# Patient Record
Sex: FEMALE | Race: WHITE | HISPANIC OR LATINO | Employment: FULL TIME | URBAN - METROPOLITAN AREA
[De-identification: names, ages, dates, MRNs, and addresses within clinical notes are randomized per-mention and may not be internally consistent; named-entity substitution may affect disease eponyms.]

---

## 2020-07-13 ENCOUNTER — OFFICE VISIT (OUTPATIENT)
Dept: URGENT CARE | Facility: CLINIC | Age: 60
End: 2020-07-13
Payer: COMMERCIAL

## 2020-07-13 VITALS
HEART RATE: 84 BPM | HEIGHT: 61 IN | TEMPERATURE: 98 F | SYSTOLIC BLOOD PRESSURE: 123 MMHG | RESPIRATION RATE: 19 BRPM | WEIGHT: 151 LBS | OXYGEN SATURATION: 97 % | DIASTOLIC BLOOD PRESSURE: 84 MMHG | BODY MASS INDEX: 28.51 KG/M2

## 2020-07-13 DIAGNOSIS — J06.9 VIRAL URI WITH COUGH: Primary | ICD-10-CM

## 2020-07-13 DIAGNOSIS — Z20.822 EXPOSURE TO COVID-19 VIRUS: ICD-10-CM

## 2020-07-13 PROCEDURE — U0003 INFECTIOUS AGENT DETECTION BY NUCLEIC ACID (DNA OR RNA); SEVERE ACUTE RESPIRATORY SYNDROME CORONAVIRUS 2 (SARS-COV-2) (CORONAVIRUS DISEASE [COVID-19]), AMPLIFIED PROBE TECHNIQUE, MAKING USE OF HIGH THROUGHPUT TECHNOLOGIES AS DESCRIBED BY CMS-2020-01-R: HCPCS

## 2020-07-13 PROCEDURE — 99203 OFFICE O/P NEW LOW 30 MIN: CPT | Mod: S$GLB,,, | Performed by: NURSE PRACTITIONER

## 2020-07-13 PROCEDURE — 71046 X-RAY EXAM CHEST 2 VIEWS: CPT | Mod: FY,S$GLB,, | Performed by: RADIOLOGY

## 2020-07-13 PROCEDURE — 71046 XR CHEST PA AND LATERAL: ICD-10-PCS | Mod: FY,S$GLB,, | Performed by: RADIOLOGY

## 2020-07-13 PROCEDURE — 99203 PR OFFICE/OUTPT VISIT, NEW, LEVL III, 30-44 MIN: ICD-10-PCS | Mod: S$GLB,,, | Performed by: NURSE PRACTITIONER

## 2020-07-13 RX ORDER — TRAMADOL HYDROCHLORIDE 50 MG/1
TABLET ORAL
COMMUNITY
Start: 2020-06-04 | End: 2023-12-04

## 2020-07-13 RX ORDER — BLOOD-GLUCOSE METER
EACH MISCELLANEOUS
COMMUNITY
Start: 2020-06-25

## 2020-07-13 RX ORDER — SITAGLIPTIN 100 MG/1
TABLET, FILM COATED ORAL
COMMUNITY
Start: 2020-06-29 | End: 2022-03-03

## 2020-07-13 RX ORDER — IRBESARTAN AND HYDROCHLOROTHIAZIDE 300; 12.5 MG/1; MG/1
1 TABLET, FILM COATED ORAL DAILY
COMMUNITY
Start: 2020-06-15

## 2020-07-13 RX ORDER — GLIPIZIDE 5 MG/1
TABLET, FILM COATED, EXTENDED RELEASE ORAL
COMMUNITY
Start: 2020-06-29 | End: 2023-12-04

## 2020-07-13 RX ORDER — PANTOPRAZOLE SODIUM 20 MG/1
TABLET, DELAYED RELEASE ORAL
COMMUNITY
Start: 2020-06-29

## 2020-07-13 RX ORDER — CETIRIZINE HYDROCHLORIDE 10 MG/1
TABLET ORAL
COMMUNITY
Start: 2020-06-03 | End: 2022-04-13

## 2020-07-13 RX ORDER — CALCIUM CARB/VITAMIN D3/VIT K1 500-500-40
TABLET,CHEWABLE ORAL
COMMUNITY
Start: 2020-04-06

## 2020-07-13 RX ORDER — LEVOTHYROXINE SODIUM 100 UG/1
100 TABLET ORAL
COMMUNITY
Start: 2020-06-29

## 2020-07-13 RX ORDER — EMPAGLIFLOZIN AND METFORMIN HYDROCHLORIDE 12.5; 1 MG/1; MG/1
TABLET ORAL
COMMUNITY
Start: 2020-06-15 | End: 2023-12-04

## 2020-07-13 RX ORDER — ATORVASTATIN CALCIUM 10 MG/1
TABLET, FILM COATED ORAL
COMMUNITY
Start: 2020-06-29

## 2020-07-13 NOTE — LETTER
[address] [facility]  [place] [address]  Phone: [phone]  Fax: [phone]          Return to Work/School    Patient: Jaelyn Peter  YOB: 1960   Date: 07/13/2020     To Whom It May Concern:     Jaelyn Peter was in contact with/seen in my office on 07/13/2020. COVID-19 is present in our communities across the state. There is limited testing for COVID at this time, so not all patients can be tested. In this situation, your employee meets the following criteria:     Jaelyn Peter has met the criteria for COVID-19 testing based upon symptoms, travel, and/or potential exposure. The test has been completed and is pending results at this time. During this time the employee is not able to work and should be quarantined per the Centers for Disease Control timelines.      If you have any questions or concerns, or if I can be of further assistance, please do not hesitate to contact me.     Sincerely,          HORACE Benedict

## 2020-07-13 NOTE — PROGRESS NOTES
"Subjective:       Patient ID: Jaelyn Peter is a 60 y.o. female.    Vitals:  height is 5' 1" (1.549 m) and weight is 68.5 kg (151 lb). Her temperature is 98 °F (36.7 °C). Her blood pressure is 123/84 and her pulse is 84. Her respiration is 19 and oxygen saturation is 97%.     Chief Complaint: URI and COVID-19 Concerns    URI   This is a new problem. The current episode started in the past 7 days (thursday ). The problem has been gradually worsening. Associated symptoms include coughing, headaches, rhinorrhea and sinus pain. Pertinent negatives include no abdominal pain, chest pain, congestion, diarrhea, dysuria, ear pain, joint pain, joint swelling, nausea, neck pain, plugged ear sensation, rash, sneezing, sore throat, swollen glands, vomiting or wheezing. Associated symptoms comments: Loss of smell. Treatments tried: joy tea. The treatment provided mild relief.       Constitution: Positive for fatigue. Negative for chills, sweating and fever.   HENT: Positive for sinus pain and sinus pressure. Negative for ear pain, congestion, sore throat and voice change.    Neck: Negative for neck pain and painful lymph nodes.   Cardiovascular: Negative for chest pain.   Eyes: Negative for eye redness.   Respiratory: Positive for cough. Negative for chest tightness, sputum production, bloody sputum, COPD, shortness of breath, stridor, wheezing and asthma.    Gastrointestinal: Negative for abdominal pain, nausea, vomiting and diarrhea.   Genitourinary: Negative for dysuria.   Musculoskeletal: Positive for muscle ache.   Skin: Negative for rash.   Allergic/Immunologic: Negative for seasonal allergies, asthma and sneezing.   Neurological: Positive for headaches.   Hematologic/Lymphatic: Negative for swollen lymph nodes.       Objective:      Physical Exam    Xr Chest Pa And Lateral    Result Date: 7/13/2020  EXAMINATION: XR CHEST PA AND LATERAL CLINICAL HISTORY: Contact with and (suspected) exposure to other viral " communicable diseases TECHNIQUE: PA and lateral views of the chest were performed. COMPARISON: 09/17/2010 FINDINGS: Heart size and pulmonary vascularity are within normal limits.  Mild tortuosity of the thoracic aorta.  No hilar or mediastinal enlargement.  Lungs are satisfactorily expanded and appear free of active disease.  No pleural fluid or pneumothorax.  Hypertrophic degenerative changes involving the thoracic spine.     No active cardiopulmonary disease and no significant interval change Electronically signed by: Moises Mendoza MD Date:    07/13/2020 Time:      Patient was seen remotely due to State of Emergency for the COVID-19 outbreak.  No apparent distress noted.  Able to speak in full sentences without difficulty or pauses.  No labored breathing noted.    13:31  Assessment:       1. Viral URI with cough    2. Exposure to Covid-19 Virus        Plan:     Discussed CDC recommendations and symptomatic treatment.  Results available within 5-7 days.  Patient verbalized understanding.    Viral URI with cough  -     XR CHEST PA AND LATERAL; Future; Expected date: 07/13/2020    Exposure to Covid-19 Virus  -     XR CHEST PA AND LATERAL; Future; Expected date: 07/13/2020         Patient Instructions   Instructions for Patients with Confirmed or Suspected COVID-19    If you are awaiting your test result, you will either be called or it will be released to the patient portal.  If you have any questions about your test, please visit [email or web address] or call our COVID-19 information line at [phone].      Preventing the Spread of Coronavirus Disease 2019 (COVID-19) in Homes and Residential Communities -- Patients     Prevention steps for people with confirmed or suspected COVID-19 (including persons under investigation) who do not need to be hospitalized and people with confirmed COVID-19 who were hospitalized and determined to be medically stable to go home.      Stay home except to get medical care.     Separate yourself from other people and animals in your home.    Call ahead before visiting your doctor.    Wear a face mask.    Cover your coughs and sneezes.    Clean your hands often.    Avoid sharing personal household items.    Clean all high-touch surfaces every day.    Monitor your symptoms. Seek prompt medical attention if your illness is worsening (e.g., difficulty breathing). Before seeking care, call your healthcare provider.    If you have a medical emergency and must call 911, notify the dispatcher that you have or are being evaluated for COVID-19. If possible, put on a face mask before emergency medical services arrive.    Use the following symptom-based strategy to return to normal activity following a suspected or confirmed case of COVID-19. Continue isolation until:   o At least 3 days (72 hours) have passed since recovery defined as resolution of fever without the use of fever-reducing medications and improvement in respiratory symptoms (e.g. cough, shortness of breath), and   o At least 10 days have passed since symptoms first appeared.     Precautions for household members, intimate partners and caregivers in a non-healthcare setting of a patient with symptomatic laboratory-confirmed COVID-19 or a patient under investigation.     Household members, intimate partners and caregivers in a non-healthcare setting may have close contact with a person with symptomatic, laboratory-confirmed COVID-19 or a person under investigation. Close contacts should monitor their health; they should call their healthcare provider right away if they develop symptoms suggestive of COVID-19 (e.g., fever, cough, shortness of breath). Close contacts should also follow these recommendations:     · Stay home for the duration of the time recommended by healthcare provider, except to get medical care. Separate yourself from other people and animals in the home.  · Monitor the patients symptoms. If the patient  is getting sicker, call his or her healthcare provider. If the patient has a medical emergency and you need to call 911, notify the dispatch personnel that the patient has or is being evaluated for COVID-19.   · Wear a facemask when around other people such as sharing a room or vehicle and before entering a healthcare provider's office.  · Cover coughs and sneezes with a tissue. Throw used tissues in a lined trash can immediately and wash hands.  · Clean hands often with soap and water for at least 20 seconds or with an alcohol-based hand , rubbing hands together until they feel dry. Avoid touching your eyes, nose, and mouth with unwashed hands.  · Clean all high-touch; surfaces every day, including counters, tabletops, doorknobs, bathroom fixtures, toilets, phones, keyboards, tablets, bedside tables, etc. Use a household cleaning spray or wipe according to label instructions.  · Avoid sharing personal household items such as dishes, drinking glasses, cups, towels, bedding, etc. After these items are used, they should be washed thoroughly with soap and water.  · Use the following symptom-based strategy to return to normal activity following a suspected or confirmed case of COVID-19. Continue isolation until:   · At least 3 days (72 hours) have passed since recovery defined as resolution of fever without the use of fever-reducing medications and improvement in respiratory symptoms (e.g. cough, shortness of breath), and   At least 10 days have passed since symptoms first appeared.Guidelines for General Prevention of COVID-19    Take steps to protect yourself from COVID-19. Perform hand hygiene frequently. Wash your hands often with soap and water for at least 20 seconds of use and alcohol-based hand , covering all surfaces of your hands and rubbing them together until they feel dry.  Avoid touching your eyes, nose, and mouth with unwashed hands.  Avoid close contact with people and stay home if  youre sick, except to get medical care.   Cover coughs and sneezes with a tissue, or use the inside of your elbow. Immediately wash your hands or use hand .     For more information, see CDC link below:    · https://www.cdc.gov/coronavirus/2019-ncov/hcp/guidance-prevent-spread.html#precautions

## 2020-07-13 NOTE — PATIENT INSTRUCTIONS
Instructions for Patients with Confirmed or Suspected COVID-19    If you are awaiting your test result, you will either be called or it will be released to the patient portal.  If you have any questions about your test, please visit [email or web address] or call our COVID-19 information line at [phone].      Preventing the Spread of Coronavirus Disease 2019 (COVID-19) in Homes and Residential Communities -- Patients     Prevention steps for people with confirmed or suspected COVID-19 (including persons under investigation) who do not need to be hospitalized and people with confirmed COVID-19 who were hospitalized and determined to be medically stable to go home.      Stay home except to get medical care.    Separate yourself from other people and animals in your home.    Call ahead before visiting your doctor.    Wear a face mask.    Cover your coughs and sneezes.    Clean your hands often.    Avoid sharing personal household items.    Clean all high-touch surfaces every day.    Monitor your symptoms. Seek prompt medical attention if your illness is worsening (e.g., difficulty breathing). Before seeking care, call your healthcare provider.    If you have a medical emergency and must call 911, notify the dispatcher that you have or are being evaluated for COVID-19. If possible, put on a face mask before emergency medical services arrive.    Use the following symptom-based strategy to return to normal activity following a suspected or confirmed case of COVID-19. Continue isolation until:   o At least 3 days (72 hours) have passed since recovery defined as resolution of fever without the use of fever-reducing medications and improvement in respiratory symptoms (e.g. cough, shortness of breath), and   o At least 10 days have passed since symptoms first appeared.     Precautions for household members, intimate partners and caregivers in a non-healthcare setting of a patient with symptomatic  laboratory-confirmed COVID-19 or a patient under investigation.     Household members, intimate partners and caregivers in a non-healthcare setting may have close contact with a person with symptomatic, laboratory-confirmed COVID-19 or a person under investigation. Close contacts should monitor their health; they should call their healthcare provider right away if they develop symptoms suggestive of COVID-19 (e.g., fever, cough, shortness of breath). Close contacts should also follow these recommendations:     · Stay home for the duration of the time recommended by healthcare provider, except to get medical care. Separate yourself from other people and animals in the home.  · Monitor the patients symptoms. If the patient is getting sicker, call his or her healthcare provider. If the patient has a medical emergency and you need to call 911, notify the dispatch personnel that the patient has or is being evaluated for COVID-19.   · Wear a facemask when around other people such as sharing a room or vehicle and before entering a healthcare provider's office.  · Cover coughs and sneezes with a tissue. Throw used tissues in a lined trash can immediately and wash hands.  · Clean hands often with soap and water for at least 20 seconds or with an alcohol-based hand , rubbing hands together until they feel dry. Avoid touching your eyes, nose, and mouth with unwashed hands.  · Clean all high-touch; surfaces every day, including counters, tabletops, doorknobs, bathroom fixtures, toilets, phones, keyboards, tablets, bedside tables, etc. Use a household cleaning spray or wipe according to label instructions.  · Avoid sharing personal household items such as dishes, drinking glasses, cups, towels, bedding, etc. After these items are used, they should be washed thoroughly with soap and water.  · Use the following symptom-based strategy to return to normal activity following a suspected or confirmed case of COVID-19.  Continue isolation until:   · At least 3 days (72 hours) have passed since recovery defined as resolution of fever without the use of fever-reducing medications and improvement in respiratory symptoms (e.g. cough, shortness of breath), and   At least 10 days have passed since symptoms first appeared.Guidelines for General Prevention of COVID-19    Take steps to protect yourself from COVID-19. Perform hand hygiene frequently. Wash your hands often with soap and water for at least 20 seconds of use and alcohol-based hand , covering all surfaces of your hands and rubbing them together until they feel dry.  Avoid touching your eyes, nose, and mouth with unwashed hands.  Avoid close contact with people and stay home if youre sick, except to get medical care.   Cover coughs and sneezes with a tissue, or use the inside of your elbow. Immediately wash your hands or use hand .     For more information, see CDC link below:    · https://www.cdc.gov/coronavirus/2019-ncov/hcp/guidance-prevent-spread.html#precautions

## 2020-07-13 NOTE — LETTER
July 27, 2020      [facility]  [address]  [place] [address]  Phone: [phone]  Fax: [phone]       Patient: Jaelyn Peter   YOB: 1960  Date of Visit: 07/27/2020    To Whom It May Concern:    Joanne Peter  was at [facility] on 07/27/2020. She may return to work/school on 7/28/2020 with no restrictions. If you have any questions or concerns, or if I can be of further assistance, please do not hesitate to contact me.    Sincerely,        ROXANNE WhiteC

## 2020-07-16 ENCOUNTER — TELEPHONE (OUTPATIENT)
Dept: URGENT CARE | Facility: CLINIC | Age: 60
End: 2020-07-16

## 2020-07-16 DIAGNOSIS — U07.1 COVID-19 VIRUS DETECTED: Primary | ICD-10-CM

## 2020-07-16 DIAGNOSIS — U07.1 COVID-19 VIRUS DETECTED: ICD-10-CM

## 2020-07-16 LAB — SARS-COV-2 RNA RESP QL NAA+PROBE: DETECTED

## 2020-07-16 RX ORDER — PROMETHAZINE HYDROCHLORIDE AND DEXTROMETHORPHAN HYDROBROMIDE 6.25; 15 MG/5ML; MG/5ML
5 SYRUP ORAL
Qty: 118 ML | Refills: 0 | Status: SHIPPED | OUTPATIENT
Start: 2020-07-16 | End: 2020-07-26

## 2020-07-16 RX ORDER — ALBUTEROL SULFATE 90 UG/1
2 AEROSOL, METERED RESPIRATORY (INHALATION) EVERY 6 HOURS PRN
Qty: 6.7 G | Refills: 1 | Status: SHIPPED | OUTPATIENT
Start: 2020-07-16

## 2020-07-16 RX ORDER — BENZONATATE 200 MG/1
200 CAPSULE ORAL 3 TIMES DAILY PRN
Qty: 20 CAPSULE | Refills: 0 | Status: SHIPPED | OUTPATIENT
Start: 2020-07-16 | End: 2020-07-26

## 2020-07-16 NOTE — TELEPHONE ENCOUNTER
Pt lab results discussed with patient. She is doing well, just feels tired. Pt denies SOB, chest pain, dizziness, heart palpitations, nausea and vomiting.

## 2021-02-02 ENCOUNTER — HOSPITAL ENCOUNTER (EMERGENCY)
Facility: HOSPITAL | Age: 61
Discharge: HOME | End: 2021-02-03
Attending: EMERGENCY MEDICINE
Payer: MEDICAID

## 2021-02-02 DIAGNOSIS — N30.00 ACUTE CYSTITIS WITHOUT HEMATURIA: ICD-10-CM

## 2021-02-02 DIAGNOSIS — I10 ESSENTIAL HYPERTENSION: ICD-10-CM

## 2021-02-02 DIAGNOSIS — M54.10 RADICULOPATHY, UNSPECIFIED SPINAL REGION: ICD-10-CM

## 2021-02-02 DIAGNOSIS — R51.9 LEFT-SIDED HEADACHE: Primary | ICD-10-CM

## 2021-02-02 DIAGNOSIS — M79.602 LEFT ARM PAIN: ICD-10-CM

## 2021-02-02 LAB
ALBUMIN SERPL BCP-MCNC: 4 G/DL (ref 3.5–5.2)
ALP SERPL-CCNC: 85 U/L (ref 55–135)
ALT SERPL W/O P-5'-P-CCNC: 32 U/L (ref 10–44)
ANION GAP SERPL CALC-SCNC: 11 MMOL/L (ref 8–16)
AST SERPL-CCNC: 25 U/L (ref 10–40)
BACTERIA #/AREA URNS HPF: ABNORMAL /HPF
BILIRUB SERPL-MCNC: 0.4 MG/DL (ref 0.1–1)
BILIRUB UR QL STRIP: NEGATIVE
BUN SERPL-MCNC: 11 MG/DL (ref 8–23)
CALCIUM SERPL-MCNC: 9.2 MG/DL (ref 8.7–10.5)
CHLORIDE SERPL-SCNC: 106 MMOL/L (ref 95–110)
CLARITY UR: ABNORMAL
CO2 SERPL-SCNC: 20 MMOL/L (ref 23–29)
COLOR UR: YELLOW
CREAT SERPL-MCNC: 0.7 MG/DL (ref 0.5–1.4)
ERYTHROCYTE [DISTWIDTH] IN BLOOD BY AUTOMATED COUNT: 13.8 % (ref 11.5–14.5)
EST. GFR  (AFRICAN AMERICAN): >60 ML/MIN/1.73 M^2
EST. GFR  (NON AFRICAN AMERICAN): >60 ML/MIN/1.73 M^2
GLUCOSE SERPL-MCNC: 98 MG/DL (ref 70–110)
GLUCOSE UR QL STRIP: ABNORMAL
HCT VFR BLD AUTO: 41.4 % (ref 37–48.5)
HGB BLD-MCNC: 14 G/DL (ref 12–16)
HGB UR QL STRIP: NEGATIVE
KETONES UR QL STRIP: NEGATIVE
LEUKOCYTE ESTERASE UR QL STRIP: NEGATIVE
MCH RBC QN AUTO: 31 PG (ref 27–31)
MCHC RBC AUTO-ENTMCNC: 33.8 G/DL (ref 32–36)
MCV RBC AUTO: 92 FL (ref 82–98)
MICROSCOPIC COMMENT: ABNORMAL
NITRITE UR QL STRIP: POSITIVE
PH UR STRIP: 5 [PH] (ref 5–8)
PLATELET # BLD AUTO: 253 K/UL (ref 150–350)
PMV BLD AUTO: 11.1 FL (ref 9.2–12.9)
POTASSIUM SERPL-SCNC: 4.1 MMOL/L (ref 3.5–5.1)
PROT SERPL-MCNC: 7.7 G/DL (ref 6–8.4)
PROT UR QL STRIP: NEGATIVE
RBC # BLD AUTO: 4.52 M/UL (ref 4–5.4)
RBC #/AREA URNS HPF: 1 /HPF (ref 0–4)
SODIUM SERPL-SCNC: 137 MMOL/L (ref 136–145)
SP GR UR STRIP: 1.02 (ref 1–1.03)
TROPONIN I SERPL DL<=0.01 NG/ML-MCNC: 0.01 NG/ML (ref 0–0.03)
URN SPEC COLLECT METH UR: ABNORMAL
UROBILINOGEN UR STRIP-ACNC: NEGATIVE EU/DL
WBC # BLD AUTO: 11.51 K/UL (ref 3.9–12.7)
YEAST URNS QL MICRO: ABNORMAL

## 2021-02-02 PROCEDURE — 96374 THER/PROPH/DIAG INJ IV PUSH: CPT

## 2021-02-02 PROCEDURE — 93005 ELECTROCARDIOGRAM TRACING: CPT

## 2021-02-02 PROCEDURE — 80053 COMPREHEN METABOLIC PANEL: CPT

## 2021-02-02 PROCEDURE — 93010 EKG 12-LEAD: ICD-10-PCS | Mod: ,,, | Performed by: INTERNAL MEDICINE

## 2021-02-02 PROCEDURE — 63600175 PHARM REV CODE 636 W HCPCS: Performed by: EMERGENCY MEDICINE

## 2021-02-02 PROCEDURE — 99285 EMERGENCY DEPT VISIT HI MDM: CPT | Mod: 25

## 2021-02-02 PROCEDURE — 84484 ASSAY OF TROPONIN QUANT: CPT

## 2021-02-02 PROCEDURE — 81000 URINALYSIS NONAUTO W/SCOPE: CPT

## 2021-02-02 PROCEDURE — 85027 COMPLETE CBC AUTOMATED: CPT

## 2021-02-02 PROCEDURE — 25500020 PHARM REV CODE 255: Performed by: EMERGENCY MEDICINE

## 2021-02-02 PROCEDURE — 93010 ELECTROCARDIOGRAM REPORT: CPT | Mod: ,,, | Performed by: INTERNAL MEDICINE

## 2021-02-02 RX ORDER — FENTANYL CITRATE 50 UG/ML
50 INJECTION, SOLUTION INTRAMUSCULAR; INTRAVENOUS
Status: COMPLETED | OUTPATIENT
Start: 2021-02-02 | End: 2021-02-02

## 2021-02-02 RX ADMIN — IOHEXOL 100 ML: 350 INJECTION, SOLUTION INTRAVENOUS at 09:02

## 2021-02-02 RX ADMIN — FENTANYL CITRATE 50 MCG: 50 INJECTION, SOLUTION INTRAMUSCULAR; INTRAVENOUS at 08:02

## 2021-02-02 RX ADMIN — IOHEXOL 100 ML: 350 INJECTION, SOLUTION INTRAVENOUS at 11:02

## 2021-02-03 VITALS
HEART RATE: 80 BPM | WEIGHT: 148 LBS | RESPIRATION RATE: 20 BRPM | SYSTOLIC BLOOD PRESSURE: 172 MMHG | BODY MASS INDEX: 27.94 KG/M2 | OXYGEN SATURATION: 95 % | HEIGHT: 61 IN | TEMPERATURE: 98 F | DIASTOLIC BLOOD PRESSURE: 94 MMHG

## 2021-02-03 PROCEDURE — 25500020 PHARM REV CODE 255: Performed by: EMERGENCY MEDICINE

## 2021-02-03 RX ORDER — NAPROXEN 500 MG/1
500 TABLET ORAL 2 TIMES DAILY WITH MEALS
Qty: 14 TABLET | Refills: 0 | Status: SHIPPED | OUTPATIENT
Start: 2021-02-03 | End: 2021-02-10

## 2021-02-03 RX ORDER — METHOCARBAMOL 500 MG/1
500 TABLET, FILM COATED ORAL 3 TIMES DAILY
Qty: 15 TABLET | Refills: 0 | Status: SHIPPED | OUTPATIENT
Start: 2021-02-03 | End: 2021-02-08

## 2021-02-03 RX ORDER — CEPHALEXIN 500 MG/1
500 CAPSULE ORAL EVERY 12 HOURS
Qty: 10 CAPSULE | Refills: 0 | Status: SHIPPED | OUTPATIENT
Start: 2021-02-03 | End: 2021-02-08

## 2021-08-04 DIAGNOSIS — Z12.31 ENCOUNTER FOR SCREENING MAMMOGRAM FOR MALIGNANT NEOPLASM OF BREAST: Primary | ICD-10-CM

## 2021-08-18 ENCOUNTER — PATIENT MESSAGE (OUTPATIENT)
Dept: OBSTETRICS AND GYNECOLOGY | Facility: CLINIC | Age: 61
End: 2021-08-18

## 2021-08-18 ENCOUNTER — HOSPITAL ENCOUNTER (OUTPATIENT)
Dept: RADIOLOGY | Facility: HOSPITAL | Age: 61
Discharge: HOME | End: 2021-08-18
Attending: FAMILY MEDICINE
Payer: MEDICAID

## 2021-08-18 DIAGNOSIS — Z12.31 ENCOUNTER FOR SCREENING MAMMOGRAM FOR MALIGNANT NEOPLASM OF BREAST: ICD-10-CM

## 2021-08-18 PROCEDURE — 77063 MAMMO DIGITAL SCREENING BILAT WITH TOMO: ICD-10-PCS | Mod: 26,,, | Performed by: RADIOLOGY

## 2021-08-18 PROCEDURE — 77067 SCR MAMMO BI INCL CAD: CPT | Mod: TC

## 2021-08-18 PROCEDURE — 77067 SCR MAMMO BI INCL CAD: CPT | Mod: 26,,, | Performed by: RADIOLOGY

## 2021-08-18 PROCEDURE — 77067 MAMMO DIGITAL SCREENING BILAT WITH TOMO: ICD-10-PCS | Mod: 26,,, | Performed by: RADIOLOGY

## 2021-08-18 PROCEDURE — 77063 BREAST TOMOSYNTHESIS BI: CPT | Mod: 26,,, | Performed by: RADIOLOGY

## 2021-08-26 ENCOUNTER — OFFICE VISIT (OUTPATIENT)
Dept: OBSTETRICS AND GYNECOLOGY | Facility: CLINIC | Age: 61
End: 2021-08-26
Payer: MEDICAID

## 2021-08-26 VITALS
DIASTOLIC BLOOD PRESSURE: 84 MMHG | BODY MASS INDEX: 29.55 KG/M2 | WEIGHT: 156.38 LBS | SYSTOLIC BLOOD PRESSURE: 118 MMHG

## 2021-08-26 DIAGNOSIS — N89.8 VAGINA ITCHING: Primary | ICD-10-CM

## 2021-08-26 DIAGNOSIS — N95.2 VAGINAL ATROPHY: ICD-10-CM

## 2021-08-26 DIAGNOSIS — Z11.3 SCREEN FOR STD (SEXUALLY TRANSMITTED DISEASE): ICD-10-CM

## 2021-08-26 PROCEDURE — 87591 N.GONORRHOEAE DNA AMP PROB: CPT | Performed by: NURSE PRACTITIONER

## 2021-08-26 PROCEDURE — 99999 PR PBB SHADOW E&M-EST. PATIENT-LVL III: CPT | Mod: PBBFAC,,, | Performed by: NURSE PRACTITIONER

## 2021-08-26 PROCEDURE — 99999 PR PBB SHADOW E&M-EST. PATIENT-LVL III: ICD-10-PCS | Mod: PBBFAC,,, | Performed by: NURSE PRACTITIONER

## 2021-08-26 PROCEDURE — 87481 CANDIDA DNA AMP PROBE: CPT | Mod: 59 | Performed by: NURSE PRACTITIONER

## 2021-08-26 PROCEDURE — 99203 OFFICE O/P NEW LOW 30 MIN: CPT | Mod: S$PBB,,, | Performed by: NURSE PRACTITIONER

## 2021-08-26 PROCEDURE — 99213 OFFICE O/P EST LOW 20 MIN: CPT | Mod: PBBFAC,PO | Performed by: NURSE PRACTITIONER

## 2021-08-26 PROCEDURE — 99203 PR OFFICE/OUTPT VISIT, NEW, LEVL III, 30-44 MIN: ICD-10-PCS | Mod: S$PBB,,, | Performed by: NURSE PRACTITIONER

## 2021-08-26 PROCEDURE — 87491 CHLMYD TRACH DNA AMP PROBE: CPT | Performed by: NURSE PRACTITIONER

## 2021-08-26 RX ORDER — CLOBETASOL PROPIONATE 0.5 MG/G
OINTMENT TOPICAL 2 TIMES DAILY
Qty: 60 G | Refills: 3 | Status: SHIPPED | OUTPATIENT
Start: 2021-08-26 | End: 2023-12-04

## 2021-08-26 RX ORDER — ESTRADIOL 0.1 MG/G
1 CREAM VAGINAL DAILY
Qty: 42.5 G | Refills: 1 | Status: SHIPPED | OUTPATIENT
Start: 2021-08-26 | End: 2023-12-04

## 2021-08-26 RX ORDER — FLUCONAZOLE 150 MG/1
150 TABLET ORAL ONCE
Qty: 2 TABLET | Refills: 1 | Status: SHIPPED | OUTPATIENT
Start: 2021-08-26 | End: 2021-08-26

## 2021-08-27 ENCOUNTER — LAB VISIT (OUTPATIENT)
Dept: LAB | Facility: HOSPITAL | Age: 61
End: 2021-08-27
Attending: NURSE PRACTITIONER
Payer: MEDICAID

## 2021-08-27 DIAGNOSIS — Z11.3 SCREEN FOR STD (SEXUALLY TRANSMITTED DISEASE): ICD-10-CM

## 2021-08-27 PROCEDURE — 80074 ACUTE HEPATITIS PANEL: CPT | Performed by: NURSE PRACTITIONER

## 2021-08-27 PROCEDURE — 36415 COLL VENOUS BLD VENIPUNCTURE: CPT | Performed by: NURSE PRACTITIONER

## 2021-08-27 PROCEDURE — 87389 HIV-1 AG W/HIV-1&-2 AB AG IA: CPT | Performed by: NURSE PRACTITIONER

## 2021-08-27 PROCEDURE — 86592 SYPHILIS TEST NON-TREP QUAL: CPT | Performed by: NURSE PRACTITIONER

## 2021-08-28 LAB — RPR SER QL: NORMAL

## 2021-08-31 LAB
C TRACH DNA SPEC QL NAA+PROBE: NOT DETECTED
HAV IGM SERPL QL IA: NEGATIVE
HBV CORE IGM SERPL QL IA: NEGATIVE
HBV SURFACE AG SERPL QL IA: NEGATIVE
HCV AB SERPL QL IA: NEGATIVE
HIV 1+2 AB+HIV1 P24 AG SERPL QL IA: NEGATIVE
N GONORRHOEA DNA SPEC QL NAA+PROBE: NOT DETECTED

## 2021-09-01 LAB
BACTERIAL VAGINOSIS DNA: NEGATIVE
CANDIDA GLABRATA DNA: NEGATIVE
CANDIDA KRUSEI DNA: NEGATIVE
CANDIDA RRNA VAG QL PROBE: NEGATIVE
T VAGINALIS RRNA GENITAL QL PROBE: NEGATIVE

## 2022-03-02 ENCOUNTER — TELEPHONE (OUTPATIENT)
Dept: VASCULAR SURGERY | Facility: CLINIC | Age: 62
End: 2022-03-02
Payer: MEDICAID

## 2022-03-02 DIAGNOSIS — I83.893 VARICOSE VEINS OF LEG WITH SWELLING, BILATERAL: Primary | ICD-10-CM

## 2022-03-02 DIAGNOSIS — I83.813 VARICOSE VEINS OF LEG WITH PAIN, BILATERAL: ICD-10-CM

## 2022-03-02 DIAGNOSIS — I83.893 VARICOSE VEINS OF LEG WITH EDEMA, BILATERAL: ICD-10-CM

## 2022-03-02 NOTE — TELEPHONE ENCOUNTER
Attempted to reach pt, pt stated that she was on an important call and will call back to schedule ultrasound and appointment with Dr. Mccauley

## 2022-03-03 ENCOUNTER — OFFICE VISIT (OUTPATIENT)
Dept: CARDIOLOGY | Facility: CLINIC | Age: 62
End: 2022-03-03
Payer: MEDICAID

## 2022-03-03 VITALS
HEIGHT: 61 IN | WEIGHT: 153.31 LBS | DIASTOLIC BLOOD PRESSURE: 77 MMHG | SYSTOLIC BLOOD PRESSURE: 118 MMHG | BODY MASS INDEX: 28.94 KG/M2 | HEART RATE: 94 BPM

## 2022-03-03 DIAGNOSIS — R06.02 SHORTNESS OF BREATH: ICD-10-CM

## 2022-03-03 DIAGNOSIS — I10 PRIMARY HYPERTENSION: Primary | ICD-10-CM

## 2022-03-03 PROCEDURE — 1159F MED LIST DOCD IN RCRD: CPT | Mod: CPTII,,, | Performed by: INTERNAL MEDICINE

## 2022-03-03 PROCEDURE — 99214 OFFICE O/P EST MOD 30 MIN: CPT | Mod: PBBFAC,PN | Performed by: INTERNAL MEDICINE

## 2022-03-03 PROCEDURE — 3078F DIAST BP <80 MM HG: CPT | Mod: CPTII,,, | Performed by: INTERNAL MEDICINE

## 2022-03-03 PROCEDURE — 3074F PR MOST RECENT SYSTOLIC BLOOD PRESSURE < 130 MM HG: ICD-10-PCS | Mod: CPTII,,, | Performed by: INTERNAL MEDICINE

## 2022-03-03 PROCEDURE — 99999 PR PBB SHADOW E&M-EST. PATIENT-LVL IV: CPT | Mod: PBBFAC,,, | Performed by: INTERNAL MEDICINE

## 2022-03-03 PROCEDURE — 99999 PR PBB SHADOW E&M-EST. PATIENT-LVL IV: ICD-10-PCS | Mod: PBBFAC,,, | Performed by: INTERNAL MEDICINE

## 2022-03-03 PROCEDURE — 3008F BODY MASS INDEX DOCD: CPT | Mod: CPTII,,, | Performed by: INTERNAL MEDICINE

## 2022-03-03 PROCEDURE — 1160F PR REVIEW ALL MEDS BY PRESCRIBER/CLIN PHARMACIST DOCUMENTED: ICD-10-PCS | Mod: CPTII,,, | Performed by: INTERNAL MEDICINE

## 2022-03-03 PROCEDURE — 3008F PR BODY MASS INDEX (BMI) DOCUMENTED: ICD-10-PCS | Mod: CPTII,,, | Performed by: INTERNAL MEDICINE

## 2022-03-03 PROCEDURE — 1160F RVW MEDS BY RX/DR IN RCRD: CPT | Mod: CPTII,,, | Performed by: INTERNAL MEDICINE

## 2022-03-03 PROCEDURE — 99204 PR OFFICE/OUTPT VISIT, NEW, LEVL IV, 45-59 MIN: ICD-10-PCS | Mod: S$PBB,,, | Performed by: INTERNAL MEDICINE

## 2022-03-03 PROCEDURE — 3078F PR MOST RECENT DIASTOLIC BLOOD PRESSURE < 80 MM HG: ICD-10-PCS | Mod: CPTII,,, | Performed by: INTERNAL MEDICINE

## 2022-03-03 PROCEDURE — 99204 OFFICE O/P NEW MOD 45 MIN: CPT | Mod: S$PBB,,, | Performed by: INTERNAL MEDICINE

## 2022-03-03 PROCEDURE — 3074F SYST BP LT 130 MM HG: CPT | Mod: CPTII,,, | Performed by: INTERNAL MEDICINE

## 2022-03-03 PROCEDURE — 1159F PR MEDICATION LIST DOCUMENTED IN MEDICAL RECORD: ICD-10-PCS | Mod: CPTII,,, | Performed by: INTERNAL MEDICINE

## 2022-03-03 RX ORDER — DULAGLUTIDE 1.5 MG/.5ML
INJECTION, SOLUTION SUBCUTANEOUS
COMMUNITY
Start: 2022-02-24 | End: 2023-12-04

## 2022-03-03 RX ORDER — INSULIN ASPART 100 [IU]/ML
5 INJECTION, SOLUTION INTRAVENOUS; SUBCUTANEOUS
COMMUNITY
Start: 2022-02-17

## 2022-03-03 RX ORDER — INSULIN GLARGINE 100 [IU]/ML
30 INJECTION, SOLUTION SUBCUTANEOUS 2 TIMES DAILY
COMMUNITY
Start: 2022-02-17

## 2022-03-03 RX ORDER — TRIAMCINOLONE ACETONIDE 1 MG/G
CREAM TOPICAL
COMMUNITY
Start: 2022-02-16

## 2022-03-03 RX ORDER — EMPAGLIFLOZIN 25 MG/1
25 TABLET, FILM COATED ORAL DAILY
COMMUNITY
Start: 2021-10-16 | End: 2022-03-03

## 2022-03-03 RX ORDER — PEN NEEDLE, DIABETIC 31 GX5/16"
NEEDLE, DISPOSABLE MISCELLANEOUS
COMMUNITY
Start: 2022-02-22

## 2022-03-03 NOTE — PROGRESS NOTES
"[facility] [address]     SUBJECTIVE:     History of Present Illness:  Patient is a 62 y.o. female presents for abnormal EKG.     Primary Diagnosis:   1. Hypertension  2. DM: positive  3. Nonsmoker  4. Family history of early CAD: aunt  at age 15;   5. Heart disease: none  6. 1990: ? Almost stroke   ROS  1. Under a lot of stress -  from ; financial issues   2. Rare chest pains when she sleeps on the left side;   3. No chest pains with walking  4. Short of breath recently with activity like normal house work; she says she gets tired ;   5. No shortness of breath at rest or at night  6. If she leans on one side, she feels something uncomfortable on the left; she sits up and feels a little dizzy  7. About one month ago, went down to the ground; unclear what happened   8. No palpitations   Review of patient's allergies indicates:   Allergen Reactions    Codeine        Past Medical History:   Diagnosis Date    Diabetes mellitus     Hypertension     Lupus     Thyroid disease        Past Surgical History:   Procedure Laterality Date    APPENDECTOMY      HERNIA REPAIR         Family History   Problem Relation Age of Onset    Diabetes Mother     Heart disease Mother     Cancer Father     Breast cancer Neg Hx        Social History     Tobacco Use    Smoking status: Never Smoker    Smokeless tobacco: Never Used   Substance Use Topics    Alcohol use: Yes    Drug use: No        Past Hospitalization:     Home meds:  Current Outpatient Medications on File Prior to Visit   Medication Sig Dispense Refill    albuterol (PROVENTIL/VENTOLIN HFA) 90 mcg/actuation inhaler Inhale 2 puffs into the lungs every 6 (six) hours as needed for Wheezing. Cough and Shortness of Breath. Rescue 6.7 g 1    atorvastatin (LIPITOR) 10 MG tablet       BD ULTRA-FINE SHORT PEN NEEDLE 31 gauge x 5/16" Ndle SMARTSIG:Pre-Filled Pen Syringe SUB-Q 4 Times Daily      cetirizine (ZYRTEC) 10 MG tablet       clobetasol 0.05% " "(TEMOVATE) 0.05 % Oint Apply topically 2 (two) times daily. 60 g 3    estradioL (ESTRACE) 0.01 % (0.1 mg/gram) vaginal cream Place 1 g vaginally once daily. 42.5 g 1    glipiZIDE (GLUCOTROL) 5 MG TR24       insulin aspart U-100 (NOVOLOG) 100 unit/mL (3 mL) InPn pen SMARTSI Unit(s) SUB-Q 3 Times Daily      irbesartan-hydrochlorothiazide (AVALIDE) 300-12.5 mg per tablet       JANUVIA 100 mg Tab       JARDIANCE 25 mg tablet Take 25 mg by mouth once daily.      LANTUS SOLOSTAR U-100 INSULIN glargine 100 units/mL (3mL) SubQ pen Inject into the skin.      levothyroxine (SYNTHROID) 100 MCG tablet       MICRO THIN LANCETS 33 gauge Misc       ONETOUCH VERIO Strp       pantoprazole (PROTONIX) 20 MG tablet       SYNJARDY 12.5-1,000 mg Tab       traMADoL (ULTRAM) 50 mg tablet       triamcinolone acetonide 0.1% (KENALOG) 0.1 % cream SMARTSIG:Topical 1 to 2 Times Daily PRN      TRULICITY 1.5 mg/0.5 mL pen injector Inject into the skin.       No current facility-administered medications on file prior to visit.       Cardiac meds:  Atorvastatin 10 mg - not on this due to muscle aches   Insulin  Glipizide  Irebesartan/HCTZ 300/12.5   Thyroid         OBJECTIVE:     Vital Signs (Most Recent)  Vitals:    22 1422   BP: 118/77   Pulse: 94   Weight: 69.6 kg (153 lb 5.3 oz)   Height: 5' 1" (1.549 m)         Physical Exam:    Neck: normal carotids, no bruits; normal JVP  Lungs :clear  Heart: RR, normal S1,S2, no murmurs, no gallops  Abd: no masses; no bruits;   Exts: normal DP and PT pulses bilaterally, no edema noted           LABS    CMP  No results for input(s): K in the last 2160 hours.    Invalid input(s): GFR    LIPID  No results for input(s): HDL, CHOL, TRIG, LDLCALC, CHOLHDL, NONHDL, TOTALCHOLEST in the last 2160 hours.      Diagnostic Results:    EKG : n/a    Chart review:    1. [facility] [place]; mammogram   EKGs: : normal     ASSESSMENT/PLAN:     1. Abnormal EKG? None available to see   2. " Chest pains: positional not cardiac  3. Shortness of breath: is this real or is she tired due to stress etc.   Plan: echocardiogram to assess LV function  2. Get copy of EKG to review  3. Call claribel Machuca MD

## 2022-03-10 ENCOUNTER — HOSPITAL ENCOUNTER (OUTPATIENT)
Dept: CARDIOLOGY | Facility: HOSPITAL | Age: 62
Discharge: HOME | End: 2022-03-10
Attending: INTERNAL MEDICINE
Payer: MEDICAID

## 2022-03-10 VITALS — BODY MASS INDEX: 28.89 KG/M2 | WEIGHT: 153 LBS | HEIGHT: 61 IN

## 2022-03-10 DIAGNOSIS — R06.02 SHORTNESS OF BREATH: ICD-10-CM

## 2022-03-10 DIAGNOSIS — I10 PRIMARY HYPERTENSION: ICD-10-CM

## 2022-03-10 PROCEDURE — 93306 TTE W/DOPPLER COMPLETE: CPT

## 2022-03-11 LAB
AORTIC ROOT ANNULUS: 2.92 CM
AORTIC VALVE CUSP SEPERATION: 2.14 CM
ASCENDING AORTA: 2.75 CM
AV INDEX (PROSTH): 0.82
AV MEAN GRADIENT: 5 MMHG
AV PEAK GRADIENT: 7 MMHG
AV VALVE AREA: 2.03 CM2
AV VELOCITY RATIO: 0.82
BSA FOR ECHO PROCEDURE: 1.73 M2
CV ECHO LV RWT: 0.8 CM
DOP CALC AO PEAK VEL: 1.36 M/S
DOP CALC AO VTI: 27.27 CM
DOP CALC LVOT AREA: 2.5 CM2
DOP CALC LVOT DIAMETER: 1.77 CM
DOP CALC LVOT PEAK VEL: 1.12 M/S
DOP CALC LVOT STROKE VOLUME: 55.29 CM3
DOP CALC MV VTI: 26.36 CM
DOP CALCLVOT PEAK VEL VTI: 22.48 CM
E WAVE DECELERATION TIME: 181.08 MSEC
E/A RATIO: 0.82
E/E' RATIO: 14.18 M/S
ECHO LV POSTERIOR WALL: 1.21 CM (ref 0.6–1.1)
EJECTION FRACTION: 60 %
FRACTIONAL SHORTENING: 35 % (ref 28–44)
INTERVENTRICULAR SEPTUM: 1.33 CM (ref 0.6–1.1)
LA MAJOR: 4.63 CM
LA MINOR: 4.66 CM
LA WIDTH: 3.53 CM
LEFT ATRIUM SIZE: 3.45 CM
LEFT ATRIUM VOLUME INDEX MOD: 20.1 ML/M2
LEFT ATRIUM VOLUME INDEX: 28.5 ML/M2
LEFT ATRIUM VOLUME MOD: 33.97 CM3
LEFT ATRIUM VOLUME: 48.08 CM3
LEFT INTERNAL DIMENSION IN SYSTOLE: 1.96 CM (ref 2.1–4)
LEFT VENTRICLE DIASTOLIC VOLUME INDEX: 20.91 ML/M2
LEFT VENTRICLE DIASTOLIC VOLUME: 35.33 ML
LEFT VENTRICLE MASS INDEX: 71 G/M2
LEFT VENTRICLE SYSTOLIC VOLUME INDEX: 7.2 ML/M2
LEFT VENTRICLE SYSTOLIC VOLUME: 12.12 ML
LEFT VENTRICULAR INTERNAL DIMENSION IN DIASTOLE: 3.01 CM (ref 3.5–6)
LEFT VENTRICULAR MASS: 120.14 G
LV LATERAL E/E' RATIO: 13 M/S
LV SEPTAL E/E' RATIO: 15.6 M/S
MV MEAN GRADIENT: 1 MMHG
MV PEAK A VEL: 0.95 M/S
MV PEAK E VEL: 0.78 M/S
MV PEAK GRADIENT: 5 MMHG
MV STENOSIS PRESSURE HALF TIME: 52.51 MS
MV VALVE AREA BY CONTINUITY EQUATION: 2.1 CM2
MV VALVE AREA P 1/2 METHOD: 4.19 CM2
PISA TR MAX VEL: 2.25 M/S
PULM VEIN S/D RATIO: 2.59
PV PEAK D VEL: 0.27 M/S
PV PEAK S VEL: 0.7 M/S
PV PEAK VELOCITY: 0.86 CM/S
RA MAJOR: 4.57 CM
RA PRESSURE: 3 MMHG
RA WIDTH: 3.91 CM
RIGHT VENTRICULAR END-DIASTOLIC DIMENSION: 3.13 CM
RV TISSUE DOPPLER FREE WALL SYSTOLIC VELOCITY 1 (APICAL 4 CHAMBER VIEW): 13.8 CM/S
STJ: 2.49 CM
TDI LATERAL: 0.06 M/S
TDI SEPTAL: 0.05 M/S
TDI: 0.06 M/S
TR MAX PG: 20 MMHG
TRICUSPID ANNULAR PLANE SYSTOLIC EXCURSION: 1.9 CM
TV REST PULMONARY ARTERY PRESSURE: 23 MMHG

## 2022-04-13 ENCOUNTER — HOSPITAL ENCOUNTER (EMERGENCY)
Facility: HOSPITAL | Age: 62
Discharge: HOME | End: 2022-04-13
Attending: EMERGENCY MEDICINE
Payer: MEDICAID

## 2022-04-13 VITALS
DIASTOLIC BLOOD PRESSURE: 92 MMHG | BODY MASS INDEX: 28.34 KG/M2 | OXYGEN SATURATION: 97 % | SYSTOLIC BLOOD PRESSURE: 153 MMHG | RESPIRATION RATE: 18 BRPM | WEIGHT: 150 LBS | HEART RATE: 82 BPM | TEMPERATURE: 98 F

## 2022-04-13 DIAGNOSIS — H81.399 PERIPHERAL VERTIGO, UNSPECIFIED LATERALITY: Primary | ICD-10-CM

## 2022-04-13 DIAGNOSIS — R42 DIZZINESS: ICD-10-CM

## 2022-04-13 LAB
ALBUMIN SERPL BCP-MCNC: 3.7 G/DL (ref 3.5–5.2)
ALP SERPL-CCNC: 88 U/L (ref 55–135)
ALT SERPL W/O P-5'-P-CCNC: 41 U/L (ref 10–44)
ANION GAP SERPL CALC-SCNC: 13 MMOL/L (ref 8–16)
AST SERPL-CCNC: 33 U/L (ref 10–40)
BASOPHILS # BLD AUTO: 0.1 K/UL (ref 0–0.2)
BASOPHILS NFR BLD: 1.2 % (ref 0–1.9)
BILIRUB SERPL-MCNC: 0.7 MG/DL (ref 0.1–1)
BUN SERPL-MCNC: 16 MG/DL (ref 8–23)
CALCIUM SERPL-MCNC: 9.6 MG/DL (ref 8.7–10.5)
CHLORIDE SERPL-SCNC: 103 MMOL/L (ref 95–110)
CO2 SERPL-SCNC: 21 MMOL/L (ref 23–29)
CREAT SERPL-MCNC: 0.7 MG/DL (ref 0.5–1.4)
DIFFERENTIAL METHOD: NORMAL
EOSINOPHIL # BLD AUTO: 0.3 K/UL (ref 0–0.5)
EOSINOPHIL NFR BLD: 3.2 % (ref 0–8)
ERYTHROCYTE [DISTWIDTH] IN BLOOD BY AUTOMATED COUNT: 12.4 % (ref 11.5–14.5)
EST. GFR  (AFRICAN AMERICAN): >60 ML/MIN/1.73 M^2
EST. GFR  (NON AFRICAN AMERICAN): >60 ML/MIN/1.73 M^2
GLUCOSE SERPL-MCNC: 238 MG/DL (ref 70–110)
HCT VFR BLD AUTO: 42.1 % (ref 37–48.5)
HGB BLD-MCNC: 14.3 G/DL (ref 12–16)
IMM GRANULOCYTES # BLD AUTO: 0.04 K/UL (ref 0–0.04)
IMM GRANULOCYTES NFR BLD AUTO: 0.5 % (ref 0–0.5)
LYMPHOCYTES # BLD AUTO: 2.1 K/UL (ref 1–4.8)
LYMPHOCYTES NFR BLD: 24.2 % (ref 18–48)
MCH RBC QN AUTO: 30.8 PG (ref 27–31)
MCHC RBC AUTO-ENTMCNC: 34 G/DL (ref 32–36)
MCV RBC AUTO: 91 FL (ref 82–98)
MONOCYTES # BLD AUTO: 0.5 K/UL (ref 0.3–1)
MONOCYTES NFR BLD: 5.8 % (ref 4–15)
NEUTROPHILS # BLD AUTO: 5.6 K/UL (ref 1.8–7.7)
NEUTROPHILS NFR BLD: 65.1 % (ref 38–73)
NRBC BLD-RTO: 0 /100 WBC
PLATELET # BLD AUTO: 355 K/UL (ref 150–450)
PMV BLD AUTO: 9.8 FL (ref 9.2–12.9)
POTASSIUM SERPL-SCNC: 4 MMOL/L (ref 3.5–5.1)
PROT SERPL-MCNC: 7.4 G/DL (ref 6–8.4)
RBC # BLD AUTO: 4.64 M/UL (ref 4–5.4)
SODIUM SERPL-SCNC: 137 MMOL/L (ref 136–145)
TROPONIN I SERPL DL<=0.01 NG/ML-MCNC: <0.006 NG/ML (ref 0–0.03)
WBC # BLD AUTO: 8.63 K/UL (ref 3.9–12.7)

## 2022-04-13 PROCEDURE — 85025 COMPLETE CBC W/AUTO DIFF WBC: CPT | Performed by: PHYSICIAN ASSISTANT

## 2022-04-13 PROCEDURE — 84484 ASSAY OF TROPONIN QUANT: CPT | Performed by: PHYSICIAN ASSISTANT

## 2022-04-13 PROCEDURE — 99284 EMERGENCY DEPT VISIT MOD MDM: CPT | Mod: 25

## 2022-04-13 PROCEDURE — 80053 COMPREHEN METABOLIC PANEL: CPT | Performed by: PHYSICIAN ASSISTANT

## 2022-04-13 PROCEDURE — 25000003 PHARM REV CODE 250: Performed by: EMERGENCY MEDICINE

## 2022-04-13 PROCEDURE — 93005 ELECTROCARDIOGRAM TRACING: CPT

## 2022-04-13 PROCEDURE — 93010 EKG 12-LEAD: ICD-10-PCS | Mod: ,,, | Performed by: INTERNAL MEDICINE

## 2022-04-13 PROCEDURE — 93010 ELECTROCARDIOGRAM REPORT: CPT | Mod: ,,, | Performed by: INTERNAL MEDICINE

## 2022-04-13 RX ORDER — CETIRIZINE HYDROCHLORIDE 10 MG/1
10 TABLET ORAL DAILY
Qty: 14 TABLET | Refills: 0 | Status: SHIPPED | OUTPATIENT
Start: 2022-04-13 | End: 2023-12-04

## 2022-04-13 RX ORDER — MECLIZINE HYDROCHLORIDE 25 MG/1
25 TABLET ORAL 3 TIMES DAILY PRN
Qty: 20 TABLET | Refills: 0 | Status: SHIPPED | OUTPATIENT
Start: 2022-04-13 | End: 2023-12-04

## 2022-04-13 RX ORDER — MECLIZINE HYDROCHLORIDE 25 MG/1
50 TABLET ORAL
Status: COMPLETED | OUTPATIENT
Start: 2022-04-13 | End: 2022-04-13

## 2022-04-13 RX ADMIN — MECLIZINE HYDROCHLORIDE 50 MG: 25 TABLET ORAL at 01:04

## 2022-04-13 NOTE — ED NOTES
Assumed care of pt. Pt presents to ED today c/o dizziness, N/V onset this am. Pt denies any unilateral weakness. Pt reports recent echo, however has not received results.   Family at bedside. Side rails up x 2 and call light within reach. Pt aware of plan of care and all questions answered at this time

## 2022-04-13 NOTE — FIRST PROVIDER EVALUATION
Emergency Department TeleTriage Encounter Note      CHIEF COMPLAINT    Chief Complaint   Patient presents with    Dizziness     Pt reports onset of dizziness this morning with n/v. Equal hand , no facial droop. Pt reports she saw cardio last month and had an echo but hasn't gotten results. Denies any pain.        VITAL SIGNS   Initial Vitals [04/13/22 1035]   BP Pulse Resp Temp SpO2   (!) 173/73 79 18 97.6 °F (36.4 °C) 99 %      MAP       --            ALLERGIES    Review of patient's allergies indicates:   Allergen Reactions    Codeine        PROVIDER TRIAGE NOTE  This is a teletriage evaluation of a 62 y.o. female presenting to the ED complaining of dizziness (room spinning) when trying to get up this morning out of bed.  She also feels as if she is going to pass out.  Some associated nausea and vomiting.  Recent cardiology appt.      Initial orders will be placed and care will be transferred to an alternate provider when patient is roomed for a full evaluation. Any additional orders and the final disposition will be determined by that provider.           ORDERS  Labs Reviewed   CBC W/ AUTO DIFFERENTIAL   COMPREHENSIVE METABOLIC PANEL       ED Orders (720h ago, onward)    Start Ordered     Status Ordering Provider    04/13/22 1158 04/13/22 1158  Saline lock IV  Once         Ordered JEREMY LAINEZ S.    04/13/22 1158 04/13/22 1158  CBC auto differential  STAT         Ordered JEREMY LAINEZ S.    04/13/22 1158 04/13/22 1158  Comprehensive metabolic panel  STAT         Ordered JEREMY LAINEZ S.    04/13/22 1136 04/13/22 1135  EKG 12-lead  Once         Ordered VINH VALENCIA            Virtual Visit Note: The provider triage portion of this emergency department evaluation and documentation was performed via [employer], a HIPAA-compliant telemedicine application, in concert with a tele-presenter in the room. A face to face patient evaluation with one of my colleagues will occur once the patient  is placed in an emergency department room.      DISCLAIMER: This note was prepared with [email or web address] voice recognition transcription software. Garbled syntax, mangled pronouns, and other bizarre constructions may be attributed to that software system.

## 2022-04-13 NOTE — ED PROVIDER NOTES
Encounter Date: 4/13/2022       History     Chief Complaint   Patient presents with    Dizziness     Pt reports onset of dizziness this morning with n/v. Equal hand , no facial droop. Pt reports she saw cardio last month and had an echo but hasn't gotten results. Denies any pain.      HPI   This is a 62 y.o. female who has a past medical history of Diabetes mellitus, Hypertension, Lupus, and Thyroid disease.     The patient presents to the Emergency Department with dizziness, sudden onset this morning, associated with nausea and 1 episode of vomiting.  Patient denies any headache, weakness, numbness.  States that symptoms start when she gets up, relieved by rest and staying still.  No sinus pressure or otalgia, difficulty hearing.  No prior history of similar symptoms.    Review of patient's allergies indicates:   Allergen Reactions    Codeine      Past Medical History:   Diagnosis Date    Diabetes mellitus     Hypertension     Lupus     Thyroid disease      Past Surgical History:   Procedure Laterality Date    APPENDECTOMY      HERNIA REPAIR       Family History   Problem Relation Age of Onset    Diabetes Mother     Heart disease Mother     Cancer Father     Breast cancer Neg Hx      Social History     Tobacco Use    Smoking status: Never Smoker    Smokeless tobacco: Never Used   Substance Use Topics    Alcohol use: Yes    Drug use: No     Review of Systems   Constitutional: Positive for activity change.   HENT: Negative for ear pain, hearing loss, sinus pressure and sinus pain.    Eyes: Negative for visual disturbance.   Respiratory: Negative for shortness of breath.    Cardiovascular: Negative for chest pain.   Gastrointestinal: Positive for nausea and vomiting.   Neurological: Positive for dizziness. Negative for weakness, numbness and headaches.   Psychiatric/Behavioral: Negative for confusion.       Physical Exam     Initial Vitals [04/13/22 1035]   BP Pulse Resp Temp SpO2   (!) 173/73 79  18 97.6 °F (36.4 °C) 99 %      MAP       --         Physical Exam    Nursing note and vitals reviewed.  Constitutional: She appears well-developed and well-nourished. She is not diaphoretic. No distress.   HENT:   Head: Normocephalic and atraumatic.   Mouth/Throat: Oropharynx is clear and moist.   Tongue/uvula midline  Right ear/TM WNL  Left TM air/fluid level, slightly cloudy, retracted.    Eyes: Conjunctivae are normal. Pupils are equal, round, and reactive to light.   Neck: Neck supple. No thyromegaly present.   Normal range of motion.  Cardiovascular: Normal rate, regular rhythm, normal heart sounds and intact distal pulses.   Pulmonary/Chest: Breath sounds normal. No respiratory distress. She has no wheezes. She has no rhonchi. She has no rales.   Musculoskeletal:         General: No tenderness or edema. Normal range of motion.      Cervical back: Normal range of motion and neck supple.     Lymphadenopathy:     She has no cervical adenopathy.   Neurological: She is alert and oriented to person, place, and time. She has normal strength.   FNF, heel to shin normal.  Neg test of skew  (+)  Leftward nystagmus  Head impulse positive.    Normal strength bilat  CN intact     Skin: Skin is warm and dry. Capillary refill takes less than 2 seconds. No rash noted.   Psychiatric: She has a normal mood and affect. Thought content normal.         ED Course   Procedures  Labs Reviewed   COMPREHENSIVE METABOLIC PANEL - Abnormal; Notable for the following components:       Result Value    CO2 21 (*)     Glucose 238 (*)     All other components within normal limits   CBC W/ AUTO DIFFERENTIAL   TROPONIN I          Imaging Results    None          Medications   meclizine tablet 50 mg (has no administration in time range)     Medical Decision Making:   Initial Assessment:   This is an emergent evaluation of a 62 y.o. female patient with presentation of vertigo.  Based upon my evaluation, including the HINTS exam, I believe the  patient has peripheral vertigo.  Believe the patient has any acute CVA or cerebellar process. I do not believe head CT or MRI of the brain is indicated at this time.    Patient was managed in the ED with meclizine. Workup in the ED including EKG, troponin, basic labs showed no significant abnormalities.    At the present time, I do not believe the patient has an emergent condition requiring further emergent workup or admission to the hospital at this time. The pt is stable for discharge. Results, clinical impression/plan and rx, if any, discussed with patient. Return precautions and after care instructions given. Pt is to follow up with PCP in 3-5  days or return to the ED for any concerns. The patient expressed understanding.                        Clinical Impression:   Final diagnoses:  [R42] Dizziness  [H81.399] Peripheral vertigo, unspecified laterality (Primary)          ED Disposition Condition    Discharge Stable        ED Prescriptions     Medication Sig Dispense Start Date End Date Auth. Provider    meclizine (ANTIVERT) 25 mg tablet Take 1 tablet (25 mg total) by mouth 3 (three) times daily as needed. 20 tablet 4/13/2022  Noe Lin MD    cetirizine (ZYRTEC) 10 MG tablet Take 1 tablet (10 mg total) by mouth once daily. for 14 days 14 tablet 4/13/2022 4/27/2022 Noe Lin MD        Follow-up Information     Follow up With Specialties Details Why Contact Info    Bibiana Raphael) MD [facility] Schedule an appointment as soon as possible for a visit   [address] [facility] [id number] [phone]             Noe Lin MD  04/13/22 3081

## 2022-04-22 ENCOUNTER — HOSPITAL ENCOUNTER (OUTPATIENT)
Dept: RADIOLOGY | Facility: OTHER | Age: 62
Discharge: HOME | End: 2022-04-22
Attending: SURGERY
Payer: MEDICAID

## 2022-04-22 ENCOUNTER — OFFICE VISIT (OUTPATIENT)
Dept: VASCULAR SURGERY | Facility: CLINIC | Age: 62
End: 2022-04-22
Payer: MEDICAID

## 2022-04-22 VITALS
BODY MASS INDEX: 28.32 KG/M2 | WEIGHT: 150 LBS | HEART RATE: 76 BPM | SYSTOLIC BLOOD PRESSURE: 166 MMHG | DIASTOLIC BLOOD PRESSURE: 91 MMHG | HEIGHT: 61 IN

## 2022-04-22 DIAGNOSIS — I83.893 VARICOSE VEINS OF LEG WITH SWELLING, BILATERAL: ICD-10-CM

## 2022-04-22 DIAGNOSIS — I83.893 VARICOSE VEINS OF LEG WITH EDEMA, BILATERAL: ICD-10-CM

## 2022-04-22 DIAGNOSIS — I83.813 VARICOSE VEINS OF LEG WITH PAIN, BILATERAL: Primary | ICD-10-CM

## 2022-04-22 DIAGNOSIS — I83.813 VARICOSE VEINS OF LEG WITH PAIN, BILATERAL: ICD-10-CM

## 2022-04-22 DIAGNOSIS — I83.891 VARICOSE VEINS OF LEG WITH SWELLING, RIGHT: Primary | ICD-10-CM

## 2022-04-22 PROCEDURE — 3008F PR BODY MASS INDEX (BMI) DOCUMENTED: ICD-10-PCS | Mod: CPTII,S$GLB,, | Performed by: SURGERY

## 2022-04-22 PROCEDURE — 3077F SYST BP >= 140 MM HG: CPT | Mod: CPTII,S$GLB,, | Performed by: SURGERY

## 2022-04-22 PROCEDURE — 3080F PR MOST RECENT DIASTOLIC BLOOD PRESSURE >= 90 MM HG: ICD-10-PCS | Mod: CPTII,S$GLB,, | Performed by: SURGERY

## 2022-04-22 PROCEDURE — 1159F MED LIST DOCD IN RCRD: CPT | Mod: CPTII,S$GLB,, | Performed by: SURGERY

## 2022-04-22 PROCEDURE — 93970 EXTREMITY STUDY: CPT | Mod: 26,,, | Performed by: RADIOLOGY

## 2022-04-22 PROCEDURE — 3077F PR MOST RECENT SYSTOLIC BLOOD PRESSURE >= 140 MM HG: ICD-10-PCS | Mod: CPTII,S$GLB,, | Performed by: SURGERY

## 2022-04-22 PROCEDURE — 93970 EXTREMITY STUDY: CPT | Mod: TC

## 2022-04-22 PROCEDURE — 3008F BODY MASS INDEX DOCD: CPT | Mod: CPTII,S$GLB,, | Performed by: SURGERY

## 2022-04-22 PROCEDURE — 3080F DIAST BP >= 90 MM HG: CPT | Mod: CPTII,S$GLB,, | Performed by: SURGERY

## 2022-04-22 PROCEDURE — 99204 OFFICE O/P NEW MOD 45 MIN: CPT | Mod: S$GLB,,, | Performed by: SURGERY

## 2022-04-22 PROCEDURE — 1159F PR MEDICATION LIST DOCUMENTED IN MEDICAL RECORD: ICD-10-PCS | Mod: CPTII,S$GLB,, | Performed by: SURGERY

## 2022-04-22 PROCEDURE — 93970 US LOWER EXTREMITY VEINS BILATERAL INSUFFICIENCY: ICD-10-PCS | Mod: 26,,, | Performed by: RADIOLOGY

## 2022-04-22 PROCEDURE — 99204 PR OFFICE/OUTPT VISIT, NEW, LEVL IV, 45-59 MIN: ICD-10-PCS | Mod: S$GLB,,, | Performed by: SURGERY

## 2022-04-22 RX ORDER — GABAPENTIN 300 MG/1
1 CAPSULE ORAL NIGHTLY
COMMUNITY
Start: 2021-06-14 | End: 2023-12-04

## 2022-04-22 NOTE — LETTER
April 22, 2022        Bibiana Rainey MD (Cindy)  [address] [facility] [address]             [facility]  [address] [address]  Phone: [phone]  Fax: [phone]   Patient: Jaelyn Peter   MR Number: [id number]   YOB: 1960   Date of Visit: 4/22/2022       Dear Dr. Rainey:    Thank you for referring Jaelyn Peter to me for evaluation. Below are the relevant portions of my assessment and plan of care.            If you have questions, please do not hesitate to call me. I look forward to following Jaelyn along with you.    Sincerely,      Yovany Mccauley MD           CC  No Recipients

## 2022-04-22 NOTE — PROGRESS NOTES
"        Yovany Mccauley MD, RPVI                                 [facility]                           [facility]                             04/22/2022    HPI:  Jaelyn Peter is a 62 y.o. female with There is no problem list on file for this patient.   being managed by PCP and specialists who is here today for evaluation of BLE varicose veins.  Patient states location is BLE occurring for yrs.  Associated signs and symptoms include pain.  Quality is aching and severity is 6/10.  Symptoms began yrs ago.  Alleviating factors include elevation.  Worsening factors include dependency.  Patient is wearing compression stockings daily.  +FH of venous disease.  Symptoms do limit patient's functional status and daily activities.  no DVT history.  no venous interventions.  no low sodium diet.  no excessive water intake.    Migraine with aura: no  PFO/ASD/right to left shunt: no  Pregnant: no  Breastfeeding: no    no MI  no Stroke  Tobacco use: no    Past Medical History:   Diagnosis Date    Diabetes mellitus     Hypertension     Lupus     Thyroid disease      Past Surgical History:   Procedure Laterality Date    APPENDECTOMY      HERNIA REPAIR       Family History   Problem Relation Age of Onset    Diabetes Mother     Heart disease Mother     Cancer Father     Breast cancer Neg Hx      Social History     Socioeconomic History    Marital status:    Tobacco Use    Smoking status: Never Smoker    Smokeless tobacco: Never Used   Substance and Sexual Activity    Alcohol use: Yes    Drug use: No    Sexual activity: Not Currently     Partners: Male       Current Outpatient Medications:     BD ULTRA-FINE SHORT PEN NEEDLE 31 gauge x 5/16" Ndle, SMARTSIG:Pre-Filled Pen Syringe SUB-Q 4 Times Daily, Disp: , Rfl:     cetirizine (ZYRTEC) 10 MG tablet, Take 1 tablet (10 mg total) by mouth once daily. for 14 days, Disp: 14 tablet, Rfl: 0    gabapentin (NEURONTIN) 300 MG capsule, Take " 1 capsule by mouth nightly., Disp: , Rfl:     glipiZIDE (GLUCOTROL) 5 MG TR24, , Disp: , Rfl:     insulin aspart U-100 (NOVOLOG) 100 unit/mL (3 mL) InPn pen, SMARTSI Unit(s) SUB-Q 3 Times Daily, Disp: , Rfl:     irbesartan-hydrochlorothiazide (AVALIDE) 300-12.5 mg per tablet, , Disp: , Rfl:     LANTUS SOLOSTAR U-100 INSULIN glargine 100 units/mL (3mL) SubQ pen, Inject into the skin., Disp: , Rfl:     levothyroxine (SYNTHROID) 100 MCG tablet, , Disp: , Rfl:     meclizine (ANTIVERT) 25 mg tablet, Take 1 tablet (25 mg total) by mouth 3 (three) times daily as needed., Disp: 20 tablet, Rfl: 0    MICRO THIN LANCETS 33 gauge Misc, , Disp: , Rfl:     ONETOUCH VERIO Strp, , Disp: , Rfl:     pantoprazole (PROTONIX) 20 MG tablet, , Disp: , Rfl:     sitagliptin phosphate (JANUVIA ORAL), Januvia Take No date recorded No form recorded No frequency recorded No route recorded No set duration recorded No set duration amount recorded active No dosage strength recorded No dosage strength units of measure recorded, Disp: , Rfl:     TRULICITY 1.5 mg/0.5 mL pen injector, Inject into the skin., Disp: , Rfl:     albuterol (PROVENTIL/VENTOLIN HFA) 90 mcg/actuation inhaler, Inhale 2 puffs into the lungs every 6 (six) hours as needed for Wheezing. Cough and Shortness of Breath. Rescue (Patient not taking: Reported on 2022), Disp: 6.7 g, Rfl: 1    atorvastatin (LIPITOR) 10 MG tablet, , Disp: , Rfl:     clobetasol 0.05% (TEMOVATE) 0.05 % Oint, Apply topically 2 (two) times daily. (Patient not taking: Reported on 2022), Disp: 60 g, Rfl: 3    estradioL (ESTRACE) 0.01 % (0.1 mg/gram) vaginal cream, Place 1 g vaginally once daily. (Patient not taking: Reported on 2022), Disp: 42.5 g, Rfl: 1    SYNJARDY 12.5-1,000 mg Tab, , Disp: , Rfl:     traMADoL (ULTRAM) 50 mg tablet, , Disp: , Rfl:     triamcinolone acetonide 0.1% (KENALOG) 0.1 % cream, SMARTSIG:Topical 1 to 2 Times Daily PRN, Disp: , Rfl:     REVIEW OF  SYSTEMS:  General: No fevers or chills; ENT: No sore throat; Allergy and Immunology: no persistent infections; Hematological and Lymphatic: No history of bleeding or easy bruising; Endocrine: negative; Respiratory: no cough, shortness of breath, or wheezing; Cardiovascular: no chest pain or dyspnea on exertion; Gastrointestinal: no abdominal pain/back, change in bowel habits, or bloody stools; Genito-Urinary: no dysuria, trouble voiding, or hematuria; Musculoskeletal: edema; Neurological: no TIA or stroke symptoms; Psychiatric: no nervousness, anxiety or depression.    PHYSICAL EXAM:      Pulse: 76         General appearance:  Alert, well-appearing, and in no distress.  Oriented to person, place, and time                    Neurological: Normal speech, no focal findings noted; CN II - XII grossly intact. RLE with sensation to light touch, LLE with sensation to light touch.            Musculoskeletal: Digits/nail without cyanosis/clubbing.  Strength 5/5 BLE.                    Neck: Supple, no significant adenopathy                  Chest:  No wheezes, symmetric air entry. No use of accessory muscles               Cardiac: Normal rate and regular rhythm            Abdomen: Soft, nontender, nondistended      Extremities:   2+ R DP pulse, 2+ L DP pulse      1+ RLE edema, 1+ LLE edema    Skin:  RLE no ulcer; LLE no ulcer      RLE + spider veins, LLE + spider veins      RLE + varicose veins, LLE + varicose veins    CEAP 3/3    VCSS 18    LAB RESULTS:  No results found for: CBC  No results found for: LABPROT, INR  Lab Results   Component Value Date     04/13/2022    K 4.0 04/13/2022     04/13/2022    CO2 21 (L) 04/13/2022     (H) 04/13/2022    BUN 16 04/13/2022    CREATININE 0.7 04/13/2022    CALCIUM 9.6 04/13/2022    ANIONGAP 13 04/13/2022    EGFRNONAA >60 04/13/2022     Lab Results   Component Value Date    WBC 8.63 04/13/2022    RBC 4.64 04/13/2022    HGB 14.3 04/13/2022    HCT 42.1 04/13/2022    MCV  91 04/13/2022    MCH 30.8 04/13/2022    MCHC 34.0 04/13/2022    RDW 12.4 04/13/2022     04/13/2022    MPV 9.8 04/13/2022    GRAN 5.6 04/13/2022    GRAN 65.1 04/13/2022    LYMPH 2.1 04/13/2022    LYMPH 24.2 04/13/2022    MONO 0.5 04/13/2022    MONO 5.8 04/13/2022    EOS 0.3 04/13/2022    BASO 0.10 04/13/2022    EOSINOPHIL 3.2 04/13/2022    BASOPHIL 1.2 04/13/2022    DIFFMETHOD Automated 04/13/2022     .No results found for: HGBA1C    IMAGING:  All pertinent imaging has been reviewed and interpreted independently.    Venous US 2022 Impression:  1. Color flow evaluation of the right lower extremity demonstrates no evidence of venous thrombosis in the deep or superficial veins. Significant reflux is not noted in the GSV.  Reflux does not include the saphenofemoral junction (SFJ).   Significant reflux is not noted in the SSV.  Reflux does not include the saphenopopliteal junction (SPJ).  There is no evidence of reflux in the Accessory vein.  2. Color flow evaluation of the left lower extremity demonstrates no evidence of venous thrombosis in the deep or superficial veins. Significant reflux is not noted in the GSV.  Reflux does not include the saphenofemoral junction (SFJ).   Significant reflux is not noted in the SSV.  Reflux does not include the saphenopopliteal junction (SPJ).  There is no evidence of reflux in the Accessory vein.        IMP/PLAN:  62 y.o. female with There is no problem list on file for this patient.   being managed by PCP and specialists who is here today for evaluation of symptomatic BLE varicose and spider veins.    -symptomatic BLE varicose veins despite compression and elevation > 3 mo - rec RLE stab phlebectomy  -recommend compression with Rx stockings, elevation, dietary changes associated with water and sodium intake discussed at length with patient  -Exercise     I spent 12 minutes evaluating this patient and greater than 50% of the time was spent counseling, coordinator care and  discussing the plan of care.  All questions were answered and patient stated understanding with agreement with the above treatment plan.    Yovany Mccauley MD RPVI  Vascular and Endovascular Surgery

## 2022-05-03 DIAGNOSIS — E11.49 TYPE 2 DIABETES MELLITUS WITH OTHER NEUROLOGIC COMPLICATION, WITH LONG-TERM CURRENT USE OF INSULIN: Primary | ICD-10-CM

## 2022-05-03 DIAGNOSIS — Z79.4 TYPE 2 DIABETES MELLITUS WITH OTHER NEUROLOGIC COMPLICATION, WITH LONG-TERM CURRENT USE OF INSULIN: Primary | ICD-10-CM

## 2022-06-17 ENCOUNTER — PROCEDURE VISIT (OUTPATIENT)
Dept: VASCULAR SURGERY | Facility: CLINIC | Age: 62
End: 2022-06-17
Payer: MEDICAID

## 2022-06-17 VITALS
HEART RATE: 92 BPM | SYSTOLIC BLOOD PRESSURE: 133 MMHG | WEIGHT: 150 LBS | DIASTOLIC BLOOD PRESSURE: 63 MMHG | BODY MASS INDEX: 28.32 KG/M2 | HEIGHT: 61 IN

## 2022-06-17 DIAGNOSIS — I83.891 VARICOSE VEINS OF LEG WITH SWELLING, RIGHT: Primary | ICD-10-CM

## 2022-06-17 DIAGNOSIS — I83.891 VARICOSE VEINS OF LEG WITH SWELLING, RIGHT: ICD-10-CM

## 2022-06-17 PROCEDURE — 37766 PHLEB VEINS - EXTREM 20+: CPT | Mod: RT,S$GLB,, | Performed by: SURGERY

## 2022-06-17 PROCEDURE — 37766 STAB PHLEBECTOMY 1 EXTREMITY >20 STABS: ICD-10-PCS | Mod: RT,S$GLB,, | Performed by: SURGERY

## 2022-06-17 RX ORDER — LIDOCAINE HYDROCHLORIDE 10 MG/ML
1 INJECTION INFILTRATION; PERINEURAL
Status: CANCELLED | OUTPATIENT
Start: 2022-06-17 | End: 2022-06-17

## 2022-06-17 NOTE — PROCEDURES
Date:  6/17/22    Pre-Procedure Diagnosis: Right lower extremity symptomatic varicose veins    Post-Procedure Diagnosis: Same    Procedure: Right lower extremity stab phlebectomy >20 stabs    Surgeon: Yovany Mccauley MD RPVI    Assistant: None    Anesthesia: Local 1% Lidocaine with epinephrine    She was brought to the procedure room and placed supine on the procedure table.  A time out was performed.  The skin of the leg was prepped and draped in sterile fashion.  Symptomatic varicosities were marked preop.  I then proceeded to perform the stab phlebectomy of the right lower extremity varicose veins to the anterior and posterior aspect of the limb.  The skin was anesthetized with tumescent, then a small skin inc was performed with a scalpel.  The spatula was then used to separate the subdermal tissue overlying the varicose vein.  The hook was then used to grasp the vein and the vein was removed with forceps and hemostats.  This was performed to >20 total areas.  Hemastasis was achieved with manual pressure and sterile dressings were applied.  Compression was then placed.    The patient then ambulated to the exam room without difficulty in stable condition.      Complications:  None

## 2022-06-20 RX ORDER — LIDOCAINE HYDROCHLORIDE 10 MG/ML
5 INJECTION INFILTRATION; PERINEURAL
Status: CANCELLED | OUTPATIENT
Start: 2022-06-20 | End: 2022-06-20

## 2022-06-29 ENCOUNTER — TELEPHONE (OUTPATIENT)
Dept: ADMINISTRATIVE | Facility: HOSPITAL | Age: 62
End: 2022-06-29
Payer: MEDICAID

## 2022-08-26 ENCOUNTER — OFFICE VISIT (OUTPATIENT)
Dept: VASCULAR SURGERY | Facility: CLINIC | Age: 62
End: 2022-08-26
Payer: MEDICAID

## 2022-08-26 VITALS — SYSTOLIC BLOOD PRESSURE: 126 MMHG | DIASTOLIC BLOOD PRESSURE: 73 MMHG | HEART RATE: 85 BPM | TEMPERATURE: 97 F

## 2022-08-26 DIAGNOSIS — I83.91 SPIDER VEIN OF RIGHT LOWER EXTREMITY: Primary | ICD-10-CM

## 2022-08-26 DIAGNOSIS — I83.93 SPIDER VEINS OF BOTH LOWER EXTREMITIES: ICD-10-CM

## 2022-08-26 PROCEDURE — 3078F PR MOST RECENT DIASTOLIC BLOOD PRESSURE < 80 MM HG: ICD-10-PCS | Mod: CPTII,S$GLB,, | Performed by: SURGERY

## 2022-08-26 PROCEDURE — 3074F SYST BP LT 130 MM HG: CPT | Mod: CPTII,S$GLB,, | Performed by: SURGERY

## 2022-08-26 PROCEDURE — 99213 OFFICE O/P EST LOW 20 MIN: CPT | Mod: S$GLB,,, | Performed by: SURGERY

## 2022-08-26 PROCEDURE — 1159F PR MEDICATION LIST DOCUMENTED IN MEDICAL RECORD: ICD-10-PCS | Mod: CPTII,S$GLB,, | Performed by: SURGERY

## 2022-08-26 PROCEDURE — 3078F DIAST BP <80 MM HG: CPT | Mod: CPTII,S$GLB,, | Performed by: SURGERY

## 2022-08-26 PROCEDURE — 99213 PR OFFICE/OUTPT VISIT, EST, LEVL III, 20-29 MIN: ICD-10-PCS | Mod: S$GLB,,, | Performed by: SURGERY

## 2022-08-26 PROCEDURE — 1159F MED LIST DOCD IN RCRD: CPT | Mod: CPTII,S$GLB,, | Performed by: SURGERY

## 2022-08-26 PROCEDURE — 3074F PR MOST RECENT SYSTOLIC BLOOD PRESSURE < 130 MM HG: ICD-10-PCS | Mod: CPTII,S$GLB,, | Performed by: SURGERY

## 2022-08-26 NOTE — PROGRESS NOTES
Yovany Mccauley MD, RPVI                                 [facility]                           [facility]                             08/26/2022    HPI:  Jaelyn Peter is a 62 y.o. female with There is no problem list on file for this patient.   being managed by PCP and specialists who is here today for evaluation of BLE varicose veins.  Patient states location is BLE occurring for yrs.  Associated signs and symptoms include pain.  Quality is aching and severity is 6/10.  Symptoms began yrs ago.  Alleviating factors include elevation.  Worsening factors include dependency.  Patient is wearing compression stockings daily.  +FH of venous disease.  Symptoms do limit patient's functional status and daily activities.  no DVT history.  no venous interventions.  no low sodium diet.  no excessive water intake.    Migraine with aura: no  PFO/ASD/right to left shunt: no  Pregnant: no  Breastfeeding: no    no MI  no Stroke  Tobacco use: no    8/2022:  C/o BLE spider veins.    Past Medical History:   Diagnosis Date    Diabetes mellitus     Hypertension     Lupus     Thyroid disease      Past Surgical History:   Procedure Laterality Date    APPENDECTOMY      HERNIA REPAIR       Family History   Problem Relation Age of Onset    Diabetes Mother     Heart disease Mother     Cancer Father     Breast cancer Neg Hx      Social History     Socioeconomic History    Marital status:    Tobacco Use    Smoking status: Never Smoker    Smokeless tobacco: Never Used   Substance and Sexual Activity    Alcohol use: Yes    Drug use: No    Sexual activity: Not Currently     Partners: Male       Current Outpatient Medications:     albuterol (PROVENTIL/VENTOLIN HFA) 90 mcg/actuation inhaler, Inhale 2 puffs into the lungs every 6 (six) hours as needed for Wheezing. Cough and Shortness of Breath. Rescue, Disp: 6.7 g, Rfl: 1    ALPRAZolam (XANAX) 0.5 MG tablet, Bring both tablets to  "clinic for procedure, Disp: 2 tablet, Rfl: 0    atorvastatin (LIPITOR) 10 MG tablet, , Disp: , Rfl:     BD ULTRA-FINE SHORT PEN NEEDLE 31 gauge x 5/16" Ndle, SMARTSIG:Pre-Filled Pen Syringe SUB-Q 4 Times Daily, Disp: , Rfl:     clobetasol 0.05% (TEMOVATE) 0.05 % Oint, Apply topically 2 (two) times daily., Disp: 60 g, Rfl: 3    estradioL (ESTRACE) 0.01 % (0.1 mg/gram) vaginal cream, Place 1 g vaginally once daily., Disp: 42.5 g, Rfl: 1    glipiZIDE (GLUCOTROL) 5 MG TR24, , Disp: , Rfl:     insulin aspart U-100 (NOVOLOG) 100 unit/mL (3 mL) InPn pen, SMARTSI Unit(s) SUB-Q 3 Times Daily, Disp: , Rfl:     irbesartan-hydrochlorothiazide (AVALIDE) 300-12.5 mg per tablet, , Disp: , Rfl:     LANTUS SOLOSTAR U-100 INSULIN glargine 100 units/mL (3mL) SubQ pen, Inject into the skin., Disp: , Rfl:     levothyroxine (SYNTHROID) 100 MCG tablet, , Disp: , Rfl:     meclizine (ANTIVERT) 25 mg tablet, Take 1 tablet (25 mg total) by mouth 3 (three) times daily as needed., Disp: 20 tablet, Rfl: 0    meloxicam (MOBIC) 7.5 MG tablet, Take 1 tablet by mouth by mouth every 12 hours as needed for pain, Disp: 14 tablet, Rfl: 0    MICRO THIN LANCETS 33 gauge Misc, , Disp: , Rfl:     ONETOUCH VERIO Strp, , Disp: , Rfl:     pantoprazole (PROTONIX) 20 MG tablet, , Disp: , Rfl:     sitagliptin phosphate (JANUVIA ORAL), Januvia Take No date recorded No form recorded No frequency recorded No route recorded No set duration recorded No set duration amount recorded active No dosage strength recorded No dosage strength units of measure recorded, Disp: , Rfl:     SYNJARDY 12.5-1,000 mg Tab, , Disp: , Rfl:     traMADoL (ULTRAM) 50 mg tablet, , Disp: , Rfl:     triamcinolone acetonide 0.1% (KENALOG) 0.1 % cream, SMARTSIG:Topical 1 to 2 Times Daily PRN, Disp: , Rfl:     TRULICITY 1.5 mg/0.5 mL pen injector, Inject into the skin., Disp: , Rfl:     cetirizine (ZYRTEC) 10 MG tablet, Take 1 tablet (10 mg total) by mouth once daily. for 14 " days, Disp: 14 tablet, Rfl: 0    gabapentin (NEURONTIN) 300 MG capsule, Take 1 capsule by mouth nightly., Disp: , Rfl:     REVIEW OF SYSTEMS:  General: No fevers or chills; ENT: No sore throat; Allergy and Immunology: no persistent infections; Hematological and Lymphatic: No history of bleeding or easy bruising; Endocrine: negative; Respiratory: no cough, shortness of breath, or wheezing; Cardiovascular: no chest pain or dyspnea on exertion; Gastrointestinal: no abdominal pain/back, change in bowel habits, or bloody stools; Genito-Urinary: no dysuria, trouble voiding, or hematuria; Musculoskeletal: edema; Neurological: no TIA or stroke symptoms; Psychiatric: no nervousness, anxiety or depression.    PHYSICAL EXAM:      Pulse: 85  Temp: 97.3 °F (36.3 °C)      General appearance:  Alert, well-appearing, and in no distress.  Oriented to person, place, and time                    Neurological: Normal speech, no focal findings noted; CN II - XII grossly intact. RLE with sensation to light touch, LLE with sensation to light touch.            Musculoskeletal: Digits/nail without cyanosis/clubbing.  Strength 5/5 BLE.                    Neck: Supple, no significant adenopathy                  Chest:  No wheezes, symmetric air entry. No use of accessory muscles               Cardiac: Normal rate and regular rhythm            Abdomen: Soft, nontender, nondistended      Extremities:   2+ R DP pulse, 2+ L DP pulse      1+ RLE edema, 1+ LLE edema    Skin:  RLE no ulcer; LLE no ulcer      RLE + spider veins, LLE + spider veins      RLE + varicose veins, LLE + varicose veins    CEAP 3/3        LAB RESULTS:  No results found for: CBC  No results found for: LABPROT, INR  Lab Results   Component Value Date     04/13/2022    K 4.0 04/13/2022     04/13/2022    CO2 21 (L) 04/13/2022     (H) 04/13/2022    BUN 16 04/13/2022    CREATININE 0.7 04/13/2022    CALCIUM 9.6 04/13/2022    ANIONGAP 13 04/13/2022    EGFRNONAA >60  04/13/2022     Lab Results   Component Value Date    WBC 8.63 04/13/2022    RBC 4.64 04/13/2022    HGB 14.3 04/13/2022    HCT 42.1 04/13/2022    MCV 91 04/13/2022    MCH 30.8 04/13/2022    MCHC 34.0 04/13/2022    RDW 12.4 04/13/2022     04/13/2022    MPV 9.8 04/13/2022    GRAN 5.6 04/13/2022    GRAN 65.1 04/13/2022    LYMPH 2.1 04/13/2022    LYMPH 24.2 04/13/2022    MONO 0.5 04/13/2022    MONO 5.8 04/13/2022    EOS 0.3 04/13/2022    BASO 0.10 04/13/2022    EOSINOPHIL 3.2 04/13/2022    BASOPHIL 1.2 04/13/2022    DIFFMETHOD Automated 04/13/2022     .No results found for: HGBA1C    IMAGING:  All pertinent imaging has been reviewed and interpreted independently.    Venous US 2022 Impression:  1. Color flow evaluation of the right lower extremity demonstrates no evidence of venous thrombosis in the deep or superficial veins. Significant reflux is not noted in the GSV.  Reflux does not include the saphenofemoral junction (SFJ).   Significant reflux is not noted in the SSV.  Reflux does not include the saphenopopliteal junction (SPJ).  There is no evidence of reflux in the Accessory vein.  2. Color flow evaluation of the left lower extremity demonstrates no evidence of venous thrombosis in the deep or superficial veins. Significant reflux is not noted in the GSV.  Reflux does not include the saphenofemoral junction (SFJ).   Significant reflux is not noted in the SSV.  Reflux does not include the saphenopopliteal junction (SPJ).  There is no evidence of reflux in the Accessory vein.        IMP/PLAN:  62 y.o. female with There is no problem list on file for this patient.   being managed by PCP and specialists who is here today for evaluation of symptomatic BLE varicose and spider veins.    -symptomatic BLE varicose veins despite compression and elevation > 3 mo s/p RLE stab phlebectomy 6/2022  -recommend compression with Rx stockings, elevation, dietary changes associated with water and sodium intake discussed at  length with patient  -Exercise   -BLE spider veins - rec RLE sclerotherapy, future LLE    I spent 12 minutes evaluating this patient and greater than 50% of the time was spent counseling, coordinator care and discussing the plan of care.  All questions were answered and patient stated understanding with agreement with the above treatment plan.    Yovany Mccauley MD [facility]  Vascular and Endovascular Surgery

## 2022-08-30 DIAGNOSIS — I83.93 SPIDER VEINS OF BOTH LOWER EXTREMITIES: ICD-10-CM

## 2022-08-30 DIAGNOSIS — I78.1 SPIDER VEIN, SYMPTOMATIC: Primary | ICD-10-CM

## 2022-11-14 DIAGNOSIS — F41.9 ANXIETY DUE TO INVASIVE PROCEDURE: Primary | ICD-10-CM

## 2022-11-14 RX ORDER — ALPRAZOLAM 0.5 MG/1
TABLET ORAL
Qty: 2 TABLET | Refills: 0 | Status: SHIPPED | OUTPATIENT
Start: 2022-11-14 | End: 2023-07-03

## 2023-01-09 DIAGNOSIS — F41.9 ANXIETY DUE TO INVASIVE PROCEDURE: Primary | ICD-10-CM

## 2023-01-10 RX ORDER — ALPRAZOLAM 0.5 MG/1
TABLET ORAL
Qty: 2 TABLET | Refills: 0 | Status: SHIPPED | OUTPATIENT
Start: 2023-01-10 | End: 2023-12-04

## 2023-01-27 ENCOUNTER — PROCEDURE VISIT (OUTPATIENT)
Dept: VASCULAR SURGERY | Facility: CLINIC | Age: 63
End: 2023-01-27
Payer: MEDICAID

## 2023-01-27 VITALS
HEIGHT: 61 IN | WEIGHT: 150 LBS | DIASTOLIC BLOOD PRESSURE: 63 MMHG | BODY MASS INDEX: 28.32 KG/M2 | HEART RATE: 95 BPM | SYSTOLIC BLOOD PRESSURE: 117 MMHG

## 2023-01-27 DIAGNOSIS — I78.1 SPIDER VEIN, SYMPTOMATIC: ICD-10-CM

## 2023-01-27 PROCEDURE — 36471 NJX SCLRSNT MLT INCMPTNT VN: CPT | Mod: RT,S$GLB,, | Performed by: SURGERY

## 2023-01-27 PROCEDURE — 36471 PR INJECTION THERAPY VEIN,MULT VEINS: ICD-10-PCS | Mod: RT,S$GLB,, | Performed by: SURGERY

## 2023-01-27 RX ORDER — FLUTICASONE PROPIONATE 50 MCG
2 SPRAY, SUSPENSION (ML) NASAL DAILY PRN
COMMUNITY
Start: 2022-12-13

## 2023-01-27 RX ORDER — CHLORHEXIDINE GLUCONATE ORAL RINSE 1.2 MG/ML
15 SOLUTION DENTAL 2 TIMES DAILY
COMMUNITY
Start: 2022-11-17 | End: 2023-12-04

## 2023-01-27 RX ORDER — AZITHROMYCIN 250 MG/1
TABLET, FILM COATED ORAL
COMMUNITY
Start: 2022-11-16 | End: 2023-12-04

## 2023-01-27 RX ORDER — AMLODIPINE BESYLATE 5 MG/1
5 TABLET ORAL
COMMUNITY
Start: 2022-12-09 | End: 2023-12-04

## 2023-01-27 RX ORDER — OSELTAMIVIR PHOSPHATE 75 MG/1
75 CAPSULE ORAL 2 TIMES DAILY
COMMUNITY
Start: 2022-11-16 | End: 2023-12-04

## 2023-01-27 RX ORDER — NAPROXEN 500 MG/1
500 TABLET ORAL 2 TIMES DAILY PRN
COMMUNITY
Start: 2022-11-16 | End: 2023-12-04

## 2023-01-27 RX ORDER — NITROFURANTOIN 25; 75 MG/1; MG/1
100 CAPSULE ORAL 2 TIMES DAILY
COMMUNITY
Start: 2022-09-12 | End: 2023-12-04

## 2023-01-27 RX ORDER — BENZONATATE 100 MG/1
100-200 CAPSULE ORAL
COMMUNITY
Start: 2022-11-16 | End: 2023-12-04

## 2023-01-27 NOTE — PROCEDURES
01/27/2023 1/27/23    Pre-Procedure Diagnosis:   1. Right lower extremity spider veins  2. Right lower extremity reticular veins    Post-Procedure Diagnsosis: Same    Procedure: Right lower extremity sclerotherapy with Asclera 2%    Surgeon: MD QUIRINO Reece    Anesthesia: Asclera     Estimated blood loss: <5cc    Complications: None       Time out performed and patient was prepped and draped in sterile fashion.  Vein light used to identify reticular veins feeding right lower extremity spider veins.  Skin overlying affected areas cleaned with alcohol.  Reticular veins were then injected with 0.5% Asclera foam sclerosant created with Tessari method with resolution of reticular veins.  Next the spider veins were identified and the overlying skin was cleaned with alcohol.  The veins were accessed with a 30 gauge needle and injected with 0.25% Asclera liquid sclerosant with near resolution of all treated areas.  Once all areas of concern to the patient were treated, sterile cotton balls and Tegaderms were applied for light compression as well as ice for 5 minutes.  We then placed a Coban wrap over the treated areas followed by compression stocking.  The patient ambulated after the procedure without issues.      MD QUIRINO Reece  [facility]

## 2023-02-09 ENCOUNTER — HOSPITAL ENCOUNTER (EMERGENCY)
Facility: HOSPITAL | Age: 63
Discharge: HOME | End: 2023-02-09
Attending: EMERGENCY MEDICINE
Payer: MEDICAID

## 2023-02-09 VITALS
RESPIRATION RATE: 18 BRPM | TEMPERATURE: 98 F | OXYGEN SATURATION: 99 % | WEIGHT: 150 LBS | BODY MASS INDEX: 28.32 KG/M2 | HEIGHT: 61 IN | HEART RATE: 82 BPM | SYSTOLIC BLOOD PRESSURE: 136 MMHG | DIASTOLIC BLOOD PRESSURE: 75 MMHG

## 2023-02-09 DIAGNOSIS — R73.9 HYPERGLYCEMIA: Primary | ICD-10-CM

## 2023-02-09 DIAGNOSIS — N30.00 ACUTE CYSTITIS WITHOUT HEMATURIA: ICD-10-CM

## 2023-02-09 DIAGNOSIS — R51.9 ACUTE NONINTRACTABLE HEADACHE, UNSPECIFIED HEADACHE TYPE: ICD-10-CM

## 2023-02-09 LAB
ALBUMIN SERPL BCP-MCNC: 4.2 G/DL (ref 3.5–5.2)
ALLENS TEST: ABNORMAL
ALP SERPL-CCNC: 116 U/L (ref 55–135)
ALT SERPL W/O P-5'-P-CCNC: 45 U/L (ref 10–44)
ANION GAP SERPL CALC-SCNC: 14 MMOL/L (ref 8–16)
AST SERPL-CCNC: 27 U/L (ref 10–40)
BACTERIA #/AREA URNS HPF: ABNORMAL /HPF
BASOPHILS # BLD AUTO: 0.1 K/UL (ref 0–0.2)
BASOPHILS NFR BLD: 1.1 % (ref 0–1.9)
BILIRUB SERPL-MCNC: 0.8 MG/DL (ref 0.1–1)
BILIRUB UR QL STRIP: NEGATIVE
BUN SERPL-MCNC: 17 MG/DL (ref 8–23)
CALCIUM SERPL-MCNC: 10.7 MG/DL (ref 8.7–10.5)
CHLORIDE SERPL-SCNC: 96 MMOL/L (ref 95–110)
CLARITY UR: CLEAR
CO2 SERPL-SCNC: 24 MMOL/L (ref 23–29)
COLOR UR: YELLOW
CREAT SERPL-MCNC: 1 MG/DL (ref 0.5–1.4)
DELSYS: ABNORMAL
DIFFERENTIAL METHOD: ABNORMAL
EOSINOPHIL # BLD AUTO: 0.3 K/UL (ref 0–0.5)
EOSINOPHIL NFR BLD: 3.3 % (ref 0–8)
ERYTHROCYTE [DISTWIDTH] IN BLOOD BY AUTOMATED COUNT: 13.2 % (ref 11.5–14.5)
EST. GFR  (NO RACE VARIABLE): >60 ML/MIN/1.73 M^2
GLUCOSE SERPL-MCNC: 321 MG/DL (ref 70–110)
GLUCOSE UR QL STRIP: ABNORMAL
HCO3 UR-SCNC: 32.4 MMOL/L (ref 24–28)
HCT VFR BLD AUTO: 47.1 % (ref 37–48.5)
HGB BLD-MCNC: 16.1 G/DL (ref 12–16)
HGB UR QL STRIP: NEGATIVE
IMM GRANULOCYTES # BLD AUTO: 0.03 K/UL (ref 0–0.04)
IMM GRANULOCYTES NFR BLD AUTO: 0.3 % (ref 0–0.5)
KETONES UR QL STRIP: NEGATIVE
LEUKOCYTE ESTERASE UR QL STRIP: ABNORMAL
LYMPHOCYTES # BLD AUTO: 2.8 K/UL (ref 1–4.8)
LYMPHOCYTES NFR BLD: 30 % (ref 18–48)
MAGNESIUM SERPL-MCNC: 1.9 MG/DL (ref 1.6–2.6)
MCH RBC QN AUTO: 31 PG (ref 27–31)
MCHC RBC AUTO-ENTMCNC: 34.2 G/DL (ref 32–36)
MCV RBC AUTO: 91 FL (ref 82–98)
MICROSCOPIC COMMENT: ABNORMAL
MONOCYTES # BLD AUTO: 0.7 K/UL (ref 0.3–1)
MONOCYTES NFR BLD: 7.8 % (ref 4–15)
NEUTROPHILS # BLD AUTO: 5.4 K/UL (ref 1.8–7.7)
NEUTROPHILS NFR BLD: 57.5 % (ref 38–73)
NITRITE UR QL STRIP: POSITIVE
NRBC BLD-RTO: 0 /100 WBC
PCO2 BLDA: 53.6 MMHG (ref 35–45)
PH SMN: 7.39 [PH] (ref 7.35–7.45)
PH UR STRIP: 6 [PH] (ref 5–8)
PLATELET # BLD AUTO: 299 K/UL (ref 150–450)
PMV BLD AUTO: 10.5 FL (ref 9.2–12.9)
PO2 BLDA: 16 MMHG (ref 40–60)
POC BE: 7 MMOL/L
POC SATURATED O2: 20 % (ref 95–100)
POC TCO2: 34 MMOL/L (ref 24–29)
POCT GLUCOSE: 239 MG/DL (ref 70–110)
POCT GLUCOSE: 323 MG/DL (ref 70–110)
POTASSIUM SERPL-SCNC: 3.6 MMOL/L (ref 3.5–5.1)
PROT SERPL-MCNC: 8.7 G/DL (ref 6–8.4)
PROT UR QL STRIP: NEGATIVE
RBC # BLD AUTO: 5.2 M/UL (ref 4–5.4)
SAMPLE: ABNORMAL
SITE: ABNORMAL
SODIUM SERPL-SCNC: 134 MMOL/L (ref 136–145)
SP GR UR STRIP: >1.03 (ref 1–1.03)
URN SPEC COLLECT METH UR: ABNORMAL
UROBILINOGEN UR STRIP-ACNC: NEGATIVE EU/DL
WBC # BLD AUTO: 9.46 K/UL (ref 3.9–12.7)
WBC #/AREA URNS HPF: 5 /HPF (ref 0–5)
YEAST URNS QL MICRO: ABNORMAL

## 2023-02-09 PROCEDURE — 96361 HYDRATE IV INFUSION ADD-ON: CPT

## 2023-02-09 PROCEDURE — 87086 URINE CULTURE/COLONY COUNT: CPT | Performed by: EMERGENCY MEDICINE

## 2023-02-09 PROCEDURE — 87186 SC STD MICRODIL/AGAR DIL: CPT | Performed by: EMERGENCY MEDICINE

## 2023-02-09 PROCEDURE — 81000 URINALYSIS NONAUTO W/SCOPE: CPT | Performed by: EMERGENCY MEDICINE

## 2023-02-09 PROCEDURE — 96374 THER/PROPH/DIAG INJ IV PUSH: CPT

## 2023-02-09 PROCEDURE — 80053 COMPREHEN METABOLIC PANEL: CPT | Performed by: EMERGENCY MEDICINE

## 2023-02-09 PROCEDURE — 87077 CULTURE AEROBIC IDENTIFY: CPT | Performed by: EMERGENCY MEDICINE

## 2023-02-09 PROCEDURE — 87088 URINE BACTERIA CULTURE: CPT | Performed by: EMERGENCY MEDICINE

## 2023-02-09 PROCEDURE — 99284 EMERGENCY DEPT VISIT MOD MDM: CPT | Mod: 25

## 2023-02-09 PROCEDURE — 63600175 PHARM REV CODE 636 W HCPCS: Performed by: EMERGENCY MEDICINE

## 2023-02-09 PROCEDURE — 96375 TX/PRO/DX INJ NEW DRUG ADDON: CPT

## 2023-02-09 PROCEDURE — 85025 COMPLETE CBC W/AUTO DIFF WBC: CPT | Performed by: EMERGENCY MEDICINE

## 2023-02-09 PROCEDURE — 99900035 HC TECH TIME PER 15 MIN (STAT)

## 2023-02-09 PROCEDURE — 82962 GLUCOSE BLOOD TEST: CPT

## 2023-02-09 PROCEDURE — 25000003 PHARM REV CODE 250: Performed by: EMERGENCY MEDICINE

## 2023-02-09 PROCEDURE — 82803 BLOOD GASES ANY COMBINATION: CPT

## 2023-02-09 PROCEDURE — 83735 ASSAY OF MAGNESIUM: CPT | Performed by: EMERGENCY MEDICINE

## 2023-02-09 RX ORDER — KETOROLAC TROMETHAMINE 30 MG/ML
15 INJECTION, SOLUTION INTRAMUSCULAR; INTRAVENOUS
Status: COMPLETED | OUTPATIENT
Start: 2023-02-09 | End: 2023-02-09

## 2023-02-09 RX ORDER — METOCLOPRAMIDE 10 MG/1
10 TABLET ORAL EVERY 6 HOURS PRN
Qty: 14 TABLET | Refills: 0 | Status: SHIPPED | OUTPATIENT
Start: 2023-02-09 | End: 2023-12-04

## 2023-02-09 RX ORDER — METOCLOPRAMIDE HYDROCHLORIDE 5 MG/ML
10 INJECTION INTRAMUSCULAR; INTRAVENOUS
Status: COMPLETED | OUTPATIENT
Start: 2023-02-09 | End: 2023-02-09

## 2023-02-09 RX ORDER — BUTALBITAL, ACETAMINOPHEN AND CAFFEINE 50; 325; 40 MG/1; MG/1; MG/1
1 TABLET ORAL EVERY 6 HOURS PRN
Qty: 14 TABLET | Refills: 0 | Status: SHIPPED | OUTPATIENT
Start: 2023-02-09 | End: 2023-03-11

## 2023-02-09 RX ORDER — CEPHALEXIN 500 MG/1
500 CAPSULE ORAL EVERY 12 HOURS
Qty: 14 CAPSULE | Refills: 0 | Status: SHIPPED | OUTPATIENT
Start: 2023-02-09 | End: 2023-02-16

## 2023-02-09 RX ADMIN — KETOROLAC TROMETHAMINE 15 MG: 30 INJECTION, SOLUTION INTRAMUSCULAR; INTRAVENOUS at 04:02

## 2023-02-09 RX ADMIN — SODIUM CHLORIDE 1000 ML: 0.9 INJECTION, SOLUTION INTRAVENOUS at 03:02

## 2023-02-09 RX ADMIN — INSULIN HUMAN 4 UNITS: 100 INJECTION, SOLUTION PARENTERAL at 04:02

## 2023-02-09 RX ADMIN — METOCLOPRAMIDE 10 MG: 5 INJECTION, SOLUTION INTRAMUSCULAR; INTRAVENOUS at 04:02

## 2023-02-09 NOTE — ED NOTES
Pt arrived from home complains of Hyperglycemia . Pt report her  at follow up appointment. Dr. Caruso at bedside . Pt has history of DM. Pt appears to be comfortable and in no distress.

## 2023-02-09 NOTE — ED PROVIDER NOTES
Encounter Date: 2/9/2023       History     Chief Complaint   Patient presents with    Hyperglycemia     Pt went to see primary doctor, glucose of 512. Dr sent her to ER. Pt reports A1C of 14. Started with blurry vision yesterday     63-year-old female referred to the emergency department for evaluation of hyperglycemia.  She had a follow-up with her doctor today and her CBG was 512.  Patient notes a mild headache.  Does note some increased thirst and urinary frequency.  Denies any dysuria.  Denies any lightheadedness or dizziness, chest pain, shortness of breath, nausea, vomiting, diarrhea.  Does feel a little fatigued.  No other symptoms reported at this time.    Review of patient's allergies indicates:   Allergen Reactions    Codeine Shortness Of Breath     Past Medical History:   Diagnosis Date    Diabetes mellitus     Hypertension     Lupus     Thyroid disease      Past Surgical History:   Procedure Laterality Date    APPENDECTOMY      HERNIA REPAIR       Family History   Problem Relation Age of Onset    Diabetes Mother     Heart disease Mother     Cancer Father     Breast cancer Neg Hx      Social History     Tobacco Use    Smoking status: Never    Smokeless tobacco: Never   Substance Use Topics    Alcohol use: Yes    Drug use: No     Review of Systems   Constitutional:  Positive for fatigue. Negative for chills.   HENT:  Negative for congestion.    Respiratory:  Negative for cough and shortness of breath.    Cardiovascular:  Negative for chest pain.   Gastrointestinal:  Negative for abdominal pain.   Musculoskeletal:  Negative for back pain.   Neurological:  Positive for headaches.     Physical Exam     Initial Vitals [02/09/23 1320]   BP Pulse Resp Temp SpO2   132/68 88 18 97.9 °F (36.6 °C) 99 %      MAP       --         Physical Exam    Nursing note and vitals reviewed.  Constitutional: She appears well-developed and well-nourished. No distress.   HENT:   Head: Atraumatic.   Eyes: Conjunctivae and EOM are  normal. Pupils are equal, round, and reactive to light.   Neck: Neck supple. No tracheal deviation present.   Normal range of motion.  Cardiovascular:  Normal rate and intact distal pulses.           Pulmonary/Chest: No respiratory distress.   Musculoskeletal:         General: No tenderness or edema. Normal range of motion.      Cervical back: Normal range of motion and neck supple.     Neurological: She is alert and oriented to person, place, and time. She has normal strength. No cranial nerve deficit. GCS score is 15. GCS eye subscore is 4. GCS verbal subscore is 5. GCS motor subscore is 6.   Skin: Skin is warm and dry.       ED Course   Procedures  Labs Reviewed   CBC W/ AUTO DIFFERENTIAL - Abnormal; Notable for the following components:       Result Value    Hemoglobin 16.1 (*)     All other components within normal limits   COMPREHENSIVE METABOLIC PANEL - Abnormal; Notable for the following components:    Sodium 134 (*)     Glucose 321 (*)     Calcium 10.7 (*)     Total Protein 8.7 (*)     ALT 45 (*)     All other components within normal limits   URINALYSIS - Abnormal; Notable for the following components:    Specific Gravity, UA >1.030 (*)     Glucose, UA 4+ (*)     Nitrite, UA Positive (*)     Leukocytes, UA 1+ (*)     All other components within normal limits   URINALYSIS MICROSCOPIC - Abnormal; Notable for the following components:    Bacteria Many (*)     All other components within normal limits   POCT GLUCOSE - Abnormal; Notable for the following components:    POCT Glucose 323 (*)     All other components within normal limits   ISTAT PROCEDURE - Abnormal; Notable for the following components:    POC PCO2 53.6 (*)     POC PO2 16 (*)     POC HCO3 32.4 (*)     POC SATURATED O2 20 (*)     POC TCO2 34 (*)     All other components within normal limits   POCT GLUCOSE - Abnormal; Notable for the following components:    POCT Glucose 239 (*)     All other components within normal limits   CULTURE, URINE    MAGNESIUM                 Medications   sodium chloride 0.9% bolus 1,000 mL 1,000 mL (0 mLs Intravenous Stopped 2/9/23 1620)   metoclopramide HCl injection 10 mg (10 mg Intravenous Given 2/9/23 1638)   insulin regular injection 4 Units 0.04 mL (4 Units Intravenous Given 2/9/23 1632)   ketorolac injection 15 mg (15 mg Intravenous Given 2/9/23 1626)     Medical Decision Making:   History:   Old Medical Records: I decided to obtain old medical records.  Old Records Summarized: records from clinic visits.       <> Summary of Records: Reviewed most recent cardiology note confirming history of hypertension and type 2 diabetes as well as home medication regimen  Initial Assessment:   63-year-old female referred to the emergency department for evaluation of hyperglycemia  Differential Diagnosis:   Differential: Hyperglycemia, DKA, dehydration, electrolyte dyscrasias, DIRK; several these diagnoses may require admission, which is being considered in this patient during this visit      Initial MDM:  Plan to check basic labs, VBG, UA, give some IV fluid, re-evaluate  Clinical Tests:   Lab Tests: Reviewed       <> Summary of Lab: CBC without leukocytosis, normal H&H, normal platelet count; CMP with hyperglycemia, normal liver and renal function tests, normal electrolytes; urinalysis concerning for nitrite positive urine concerning for infection  ED Management:  Urine sent for culture.  Patient given some IV fluid, insulin, Toradol, and Reglan.  On re-evaluation reports resolution of her headache.  Informed her of results as well as plan to discharge with prescriptions for Reglan, Fioricet, Keflex.  Instructed to follow up with primary care physician, given strict return precautions.      Of note, after discharge I found the patient's Keflex prescription here.  Called her and informed her that I will send it to her pharmacy, confirm pharmacy, sent electronically.                         Clinical Impression:   Final  diagnoses:  [R73.9] Hyperglycemia (Primary)  [R51.9] Acute nonintractable headache, unspecified headache type  [N30.00] Acute cystitis without hematuria        ED Disposition Condition    Discharge Stable          ED Prescriptions       Medication Sig Dispense Start Date End Date Auth. Provider    metoclopramide HCl (REGLAN) 10 MG tablet Take 1 tablet (10 mg total) by mouth every 6 (six) hours as needed (Nausea). 14 tablet 2/9/2023 -- Felipe Ortega MD    butalbital-acetaminophen-caffeine -40 mg (FIORICET, ESGIC) -40 mg per tablet Take 1 tablet by mouth every 6 (six) hours as needed for Pain. 14 tablet 2/9/2023 3/11/2023 Felipe Ortega MD    cephALEXin (KEFLEX) 500 MG capsule Take 1 capsule (500 mg total) by mouth every 12 (twelve) hours. for 7 days 14 capsule 2/9/2023 2/16/2023 Felipe Ortega MD          Follow-up Information       Follow up With Specialties Details Why Contact Info    Bibiana Raphael) MD [facility] Schedule an appointment as soon as possible for a visit   [address] [facility]  [place] [id number] [phone]               Felipe Ortega MD  02/09/23 1365

## 2023-02-09 NOTE — DISCHARGE INSTRUCTIONS
Please call your primary care physician for follow-up appointment for re-evaluation.  Please return with any new or worsening symptoms.

## 2023-02-13 LAB — BACTERIA UR CULT: ABNORMAL

## 2023-04-01 ENCOUNTER — HOSPITAL ENCOUNTER (EMERGENCY)
Facility: HOSPITAL | Age: 63
Discharge: HOME | End: 2023-04-01
Attending: STUDENT IN AN ORGANIZED HEALTH CARE EDUCATION/TRAINING PROGRAM
Payer: MEDICAID

## 2023-04-01 VITALS
SYSTOLIC BLOOD PRESSURE: 141 MMHG | TEMPERATURE: 99 F | WEIGHT: 153 LBS | HEIGHT: 63 IN | DIASTOLIC BLOOD PRESSURE: 78 MMHG | OXYGEN SATURATION: 96 % | BODY MASS INDEX: 27.11 KG/M2 | HEART RATE: 101 BPM | RESPIRATION RATE: 18 BRPM

## 2023-04-01 DIAGNOSIS — I83.019 VENOUS ULCER OF RIGHT LEG: ICD-10-CM

## 2023-04-01 DIAGNOSIS — L03.115 CELLULITIS OF RIGHT LOWER EXTREMITY: Primary | ICD-10-CM

## 2023-04-01 DIAGNOSIS — L97.919 VENOUS ULCER OF RIGHT LEG: ICD-10-CM

## 2023-04-01 PROCEDURE — 25000003 PHARM REV CODE 250: Performed by: STUDENT IN AN ORGANIZED HEALTH CARE EDUCATION/TRAINING PROGRAM

## 2023-04-01 PROCEDURE — 99284 EMERGENCY DEPT VISIT MOD MDM: CPT

## 2023-04-01 RX ORDER — CEPHALEXIN 500 MG/1
500 CAPSULE ORAL 4 TIMES DAILY
Qty: 20 CAPSULE | Refills: 0 | Status: SHIPPED | OUTPATIENT
Start: 2023-04-01 | End: 2023-04-08

## 2023-04-01 RX ORDER — DEXTROMETHORPHAN HYDROBROMIDE, GUAIFENESIN 5; 100 MG/5ML; MG/5ML
650 LIQUID ORAL EVERY 6 HOURS PRN
Qty: 30 TABLET | Refills: 0 | Status: SHIPPED | OUTPATIENT
Start: 2023-04-01 | End: 2023-04-08

## 2023-04-01 RX ORDER — CEPHALEXIN 500 MG/1
500 CAPSULE ORAL
Status: COMPLETED | OUTPATIENT
Start: 2023-04-01 | End: 2023-04-01

## 2023-04-01 RX ORDER — ACETAMINOPHEN 500 MG
1000 TABLET ORAL
Status: COMPLETED | OUTPATIENT
Start: 2023-04-01 | End: 2023-04-01

## 2023-04-01 RX ADMIN — ACETAMINOPHEN 1000 MG: 500 TABLET ORAL at 09:04

## 2023-04-01 RX ADMIN — CEPHALEXIN 500 MG: 500 CAPSULE ORAL at 09:04

## 2023-04-02 NOTE — ED PROVIDER NOTES
NAME:  Jaelyn Peter  CSN:     [id number]  MRN:    [id number]  ADMIT DATE: 4/1/2023        eMERGENCY dEPARTMENT eNCOUnter    SCRIBE #1 NOTE: I, Mena Maxwell , am scribing for, and in the presence of, Dr. Mcnulty.   CHIEF COMPLAINT    Chief Complaint   Patient presents with    Leg Pain     Complaining of pain to right lower leg.  Sore and redness to lower leg.       HPI    Jaelyn Peter is a 63 y.o. female with a past medical history of  has a past medical history of Diabetes mellitus, Hypertension, Lupus, and Thyroid disease.     she presents to the ED due to right lower leg pain with redness involving a small ulceration to the right lower leg. Her symptoms began two weeks ago but have worsened within the last few days. She denies having any fever. She has not attempted to treat her symptoms. She mentions she had a varicose vein surgery on her right leg in June 2022. She states she has an upcoming appointment with her vascular surgeon in April.  She states she went to the pharmacy today to see if they had any sort of topical agent that she could place over the area to assist with the pain and they recommended she come here for further evaluation.  She does note history of diabetes that is currently well controlled per her report.        PAST MEDICAL HISTORY  Past Medical History:   Diagnosis Date    Diabetes mellitus     Hypertension     Lupus     Thyroid disease        SURGICAL HISTORY    Past Surgical History:   Procedure Laterality Date    APPENDECTOMY      HERNIA REPAIR         FAMILY HISTORY    Family History   Problem Relation Age of Onset    Diabetes Mother     Heart disease Mother     Cancer Father     Breast cancer Neg Hx        SOCIAL HISTORY    Social History     Socioeconomic History    Marital status:    Tobacco Use    Smoking status: Never    Smokeless tobacco: Never   Substance and Sexual Activity    Alcohol use: Yes    Drug use: No    Sexual activity: Not Currently     Partners:  "Male       MEDICATIONS  Current Outpatient Medications   Medication Instructions    acetaminophen (TYLENOL) 650 mg, Oral, Every 6 hours PRN    albuterol (PROVENTIL/VENTOLIN HFA) 90 mcg/actuation inhaler 2 puffs, Inhalation, Every 6 hours PRN, Cough and Shortness of Breath. Rescue    ALPRAZolam (XANAX) 0.5 MG tablet Bring both tablets to clinic for procedure    ALPRAZolam (XANAX) 0.5 MG tablet Take one tablet by mouth 1 hour before procedure and bring other tablet with you to the procedure.    ALPRAZolam (XANAX) 0.5 MG tablet Take one tablet by mouth 1 hour before procedure and bring other tablet with you to the procedure.    amLODIPine (NORVASC) 5 mg, Oral    atorvastatin (LIPITOR) 10 MG tablet No dose, route, or frequency recorded.    azithromycin (Z-BLANCA) 250 MG tablet Oral    BD ULTRA-FINE SHORT PEN NEEDLE 31 gauge x 5/16" Ndle SMARTSIG:Pre-Filled Pen Syringe SUB-Q 4 Times Daily    benzonatate (TESSALON) 100-200 mg, Oral    cephALEXin (KEFLEX) 500 mg, Oral, 4 times daily    cetirizine (ZYRTEC) 10 mg, Oral, Daily    chlorhexidine (PERIDEX) 0.12 % solution 15 mLs, 2 times daily    clobetasol 0.05% (TEMOVATE) 0.05 % Oint Topical (Top), 2 times daily    estradioL (ESTRACE) 1 g, Vaginal, Daily    fluticasone propionate (FLONASE) 50 mcg/actuation nasal spray 2 sprays, Each Nostril, Daily PRN    gabapentin (NEURONTIN) 300 MG capsule 1 capsule, Oral, Nightly    glipiZIDE (GLUCOTROL) 5 MG TR24 No dose, route, or frequency recorded.    insulin aspart U-100 (NOVOLOG) 100 unit/mL (3 mL) InPn pen SMARTSI Unit(s) SUB-Q 3 Times Daily    irbesartan-hydrochlorothiazide (AVALIDE) 300-12.5 mg per tablet No dose, route, or frequency recorded.    LANTUS SOLOSTAR U-100 INSULIN glargine 100 units/mL (3mL) SubQ pen Subcutaneous    levothyroxine (SYNTHROID) 100 MCG tablet No dose, route, or frequency recorded.    meclizine (ANTIVERT) 25 mg, Oral, 3 times daily PRN    meloxicam (MOBIC) 7.5 MG tablet Take 1 tablet by mouth by mouth every " "12 hours as needed for pain    metoclopramide HCl (REGLAN) 10 mg, Oral, Every 6 hours PRN    MICRO THIN LANCETS 33 gauge Misc No dose, route, or frequency recorded.    naproxen (NAPROSYN) 500 mg, Oral, 2 times daily PRN    nitrofurantoin, macrocrystal-monohydrate, (MACROBID) 100 MG capsule 100 mg, Oral, 2 times daily    ONETOUCH VERIO Strp No dose, route, or frequency recorded.    oseltamivir (TAMIFLU) 75 mg, Oral, 2 times daily    pantoprazole (PROTONIX) 20 MG tablet No dose, route, or frequency recorded.    sitagliptin phosphate (JANUVIA ORAL) Januvia Take No date recorded No form recorded No frequency recorded No route recorded No set duration recorded No set duration amount recorded active No dosage strength recorded No dosage strength units of measure recorded    SYNJARDY 12.5-1,000 mg Tab No dose, route, or frequency recorded.    traMADoL (ULTRAM) 50 mg tablet No dose, route, or frequency recorded.    triamcinolone acetonide 0.1% (KENALOG) 0.1 % cream SMARTSIG:Topical 1 to 2 Times Daily PRN    TRULICITY 1.5 mg/0.5 mL pen injector Subcutaneous       ALLERGIES    Review of patient's allergies indicates:   Allergen Reactions    Codeine Shortness Of Breath         REVIEW OF SYSTEMS   Review of Systems   Musculoskeletal:  Positive for myalgias (right lower leg).   Skin:  Positive for color change (redness to right lower leg).        PHYSICAL EXAM    Reviewed Triage Note    VITAL SIGNS:   ED Triage Vitals [04/01/23 2055]   Enc Vitals Group      BP (!) 141/78      Pulse 101      Resp 18      Temp 98.6 °F (37 °C)      Temp Source Oral      SpO2 96 %      Weight 153 lb      Height 5' 3"      Head Circumference       Peak Flow       Pain Score       Pain Loc       Pain Edu?       Excl. in GC?        Patient Vitals for the past 24 hrs:   BP Temp Temp src Pulse Resp SpO2 Height Weight   04/01/23 2055 (!) 141/78 98.6 °F (37 °C) Oral 101 18 96 % 5' 3" (1.6 m) 69.4 kg (153 lb)       Physical Exam    Nursing note and vitals " reviewed.  Constitutional: She appears well-developed and well-nourished.   HENT:   Head: Normocephalic and atraumatic.   Eyes: EOM are normal. Pupils are equal, round, and reactive to light.   Neck: Neck supple.   Normal range of motion.  Cardiovascular:  Normal rate, regular rhythm, normal heart sounds and intact distal pulses.           Pulmonary/Chest: Breath sounds normal. No respiratory distress.   Abdominal: Abdomen is soft. There is no abdominal tenderness.   Musculoskeletal:         General: Normal range of motion.      Cervical back: Normal range of motion and neck supple.     Neurological: She is alert and oriented to person, place, and time.   Skin: Skin is warm and dry.   Point ulcer with surrounding erythema to the right medial lower leg   Psychiatric: She has a normal mood and affect.            EKG     Interpreted by EM physician if performed:               LABS  Pertinent labs reviewed. (See chart for details)   Labs Reviewed - No data to display      RADIOLOGY          Imaging Results    None           PROCEDURES    Procedures f      ED COURSE & MEDICAL DECISION MAKING    Pertinent Labs & Imaging studies reviewed. (See chart for details and specific orders.)        Summary of review of records:     follows with , vasc surg, for management of her varicose veins. Next appt 4/19.     MDM:  Jaelyn Peter is a 63 y.o. female for small ulceration to right lower leg over the past 2 weeks with mildly worsening redness and pain.  Did discuss possibility of early infection and will treat with Keflex at this time for cellulitis.  No concern for DVT, systemic infection.  Intact distal pulses.  Overall well-appearing.  Given a dose of Keflex here as well as Tylenol for pain and prescriptions provided for home.  Advised close follow-up and reasons return discussed.            Medications   cephALEXin capsule 500 mg (500 mg Oral Given 4/1/23 2123)   acetaminophen tablet 1,000 mg (1,000 mg Oral  Given 4/1/23 2123)              FINAL IMPRESSION    Final diagnoses:  [L03.115] Cellulitis of right lower extremity (Primary)  [I83.019, L97.919] Venous ulcer of right leg       DISPOSITION  Patient discharged in stable condition        ED Prescriptions       Medication Sig Dispense Start Date End Date Auth. Provider    cephALEXin (KEFLEX) 500 MG capsule Take 1 capsule (500 mg total) by mouth 4 (four) times daily. for 7 days 20 capsule 4/1/2023 4/8/2023 Michaela Mcnulty DO    acetaminophen (TYLENOL) 650 MG TbSR Take 1 tablet (650 mg total) by mouth every 6 (six) hours as needed (fever, pain). 30 tablet 4/1/2023 4/8/2023 Michaela Mcnulty DO          Follow-up Information       Follow up With Specialties Details Why Contact Info    Bibiana Raphael) MD [facility] Schedule an appointment as soon as possible for a visit   [address] [facility] [id number] [phone]      [facility] Emergency Dept Emergency Medicine  As needed, If symptoms worsen [address] [facility] [id number] [phone]              DISCLAIMER: This note was prepared with [employer] voice recognition transcription software. Garbled syntax, mangled pronouns, and other bizarre constructions may be attributed to that software system.    Scribe #1: I performed the above scribed service and the documentation accurately describes the services I performed. I attest to the accuracy of the note.     DO Michaela Diallo DO  04/01/23 2144

## 2023-04-19 ENCOUNTER — OFFICE VISIT (OUTPATIENT)
Dept: VASCULAR SURGERY | Facility: CLINIC | Age: 63
End: 2023-04-19
Payer: MEDICAID

## 2023-04-19 VITALS
BODY MASS INDEX: 27.11 KG/M2 | HEIGHT: 63 IN | WEIGHT: 153 LBS | DIASTOLIC BLOOD PRESSURE: 72 MMHG | HEART RATE: 85 BPM | SYSTOLIC BLOOD PRESSURE: 127 MMHG

## 2023-04-19 DIAGNOSIS — I83.813 VARICOSE VEINS OF LEG WITH PAIN, BILATERAL: Primary | ICD-10-CM

## 2023-04-19 PROCEDURE — 3074F SYST BP LT 130 MM HG: CPT | Mod: CPTII,S$GLB,, | Performed by: SURGERY

## 2023-04-19 PROCEDURE — 3078F PR MOST RECENT DIASTOLIC BLOOD PRESSURE < 80 MM HG: ICD-10-PCS | Mod: CPTII,S$GLB,, | Performed by: SURGERY

## 2023-04-19 PROCEDURE — 3008F PR BODY MASS INDEX (BMI) DOCUMENTED: ICD-10-PCS | Mod: CPTII,S$GLB,, | Performed by: SURGERY

## 2023-04-19 PROCEDURE — 99213 PR OFFICE/OUTPT VISIT, EST, LEVL III, 20-29 MIN: ICD-10-PCS | Mod: S$GLB,,, | Performed by: SURGERY

## 2023-04-19 PROCEDURE — 3078F DIAST BP <80 MM HG: CPT | Mod: CPTII,S$GLB,, | Performed by: SURGERY

## 2023-04-19 PROCEDURE — 3008F BODY MASS INDEX DOCD: CPT | Mod: CPTII,S$GLB,, | Performed by: SURGERY

## 2023-04-19 PROCEDURE — 99213 OFFICE O/P EST LOW 20 MIN: CPT | Mod: S$GLB,,, | Performed by: SURGERY

## 2023-04-19 PROCEDURE — 1159F PR MEDICATION LIST DOCUMENTED IN MEDICAL RECORD: ICD-10-PCS | Mod: CPTII,S$GLB,, | Performed by: SURGERY

## 2023-04-19 PROCEDURE — 3074F PR MOST RECENT SYSTOLIC BLOOD PRESSURE < 130 MM HG: ICD-10-PCS | Mod: CPTII,S$GLB,, | Performed by: SURGERY

## 2023-04-19 PROCEDURE — 1159F MED LIST DOCD IN RCRD: CPT | Mod: CPTII,S$GLB,, | Performed by: SURGERY

## 2023-04-19 NOTE — PROGRESS NOTES
Yovany Mccauley MD, RPVI                                 [facility]                           [facility]                             04/19/2023    HPI:  Jaelyn Peter is a 63 y.o. female with There is no problem list on file for this patient.   being managed by PCP and specialists who is here today for evaluation of BLE varicose veins.  Patient states location is BLE occurring for yrs.  Associated signs and symptoms include pain.  Quality is aching and severity is 6/10.  Symptoms began yrs ago.  Alleviating factors include elevation.  Worsening factors include dependency.  Patient is wearing compression stockings daily.  +FH of venous disease.  Symptoms do limit patient's functional status and daily activities.  no DVT history.  no venous interventions.  no low sodium diet.  no excessive water intake.    Migraine with aura: no  PFO/ASD/right to left shunt: no  Pregnant: no  Breastfeeding: no    no MI  no Stroke  Tobacco use: no    8/2022:  C/o BLE spider veins.    4/2023:  s/p Right lower extremity sclerotherapy 1/2023 and RLE stab phlebectomy 6/2022.  Doing well. Happy with results.  C/o LLE painful varicose veins and spider veins.    Past Medical History:   Diagnosis Date    Diabetes mellitus     Hypertension     Lupus     Thyroid disease      Past Surgical History:   Procedure Laterality Date    APPENDECTOMY      HERNIA REPAIR       Family History   Problem Relation Age of Onset    Diabetes Mother     Heart disease Mother     Cancer Father     Breast cancer Neg Hx      Social History     Socioeconomic History    Marital status:    Tobacco Use    Smoking status: Never    Smokeless tobacco: Never   Substance and Sexual Activity    Alcohol use: Yes    Drug use: No    Sexual activity: Not Currently     Partners: Male       Current Outpatient Medications:     albuterol (PROVENTIL/VENTOLIN HFA) 90 mcg/actuation inhaler, Inhale 2 puffs into the lungs every 6 (six) hours  "as needed for Wheezing. Cough and Shortness of Breath. Rescue, Disp: 6.7 g, Rfl: 1    ALPRAZolam (XANAX) 0.5 MG tablet, Bring both tablets to clinic for procedure, Disp: 2 tablet, Rfl: 0    ALPRAZolam (XANAX) 0.5 MG tablet, Take one tablet by mouth 1 hour before procedure and bring other tablet with you to the procedure., Disp: 2 tablet, Rfl: 0    ALPRAZolam (XANAX) 0.5 MG tablet, Take one tablet by mouth 1 hour before procedure and bring other tablet with you to the procedure., Disp: 2 tablet, Rfl: 0    amLODIPine (NORVASC) 5 MG tablet, Take 5 mg by mouth., Disp: , Rfl:     atorvastatin (LIPITOR) 10 MG tablet, , Disp: , Rfl:     azithromycin (Z-BLANCA) 250 MG tablet, Take by mouth., Disp: , Rfl:     BD ULTRA-FINE SHORT PEN NEEDLE 31 gauge x 5/16" Ndle, SMARTSIG:Pre-Filled Pen Syringe SUB-Q 4 Times Daily, Disp: , Rfl:     benzonatate (TESSALON) 100 MG capsule, Take 100-200 mg by mouth., Disp: , Rfl:     chlorhexidine (PERIDEX) 0.12 % solution, 15 mLs 2 (two) times daily., Disp: , Rfl:     clobetasol 0.05% (TEMOVATE) 0.05 % Oint, Apply topically 2 (two) times daily., Disp: 60 g, Rfl: 3    fluticasone propionate (FLONASE) 50 mcg/actuation nasal spray, 2 sprays by Each Nostril route daily as needed., Disp: , Rfl:     glipiZIDE (GLUCOTROL) 5 MG TR24, , Disp: , Rfl:     insulin aspart U-100 (NOVOLOG) 100 unit/mL (3 mL) InPn pen, SMARTSI Unit(s) SUB-Q 3 Times Daily, Disp: , Rfl:     irbesartan-hydrochlorothiazide (AVALIDE) 300-12.5 mg per tablet, , Disp: , Rfl:     LANTUS SOLOSTAR U-100 INSULIN glargine 100 units/mL (3mL) SubQ pen, Inject into the skin., Disp: , Rfl:     levothyroxine (SYNTHROID) 100 MCG tablet, , Disp: , Rfl:     meclizine (ANTIVERT) 25 mg tablet, Take 1 tablet (25 mg total) by mouth 3 (three) times daily as needed., Disp: 20 tablet, Rfl: 0    meloxicam (MOBIC) 7.5 MG tablet, Take 1 tablet by mouth by mouth every 12 hours as needed for pain, Disp: 14 tablet, Rfl: 0    metoclopramide HCl (REGLAN) 10 MG " tablet, Take 1 tablet (10 mg total) by mouth every 6 (six) hours as needed (Nausea)., Disp: 14 tablet, Rfl: 0    MICRO THIN LANCETS 33 gauge Misc, , Disp: , Rfl:     naproxen (NAPROSYN) 500 MG tablet, Take 500 mg by mouth 2 (two) times daily as needed., Disp: , Rfl:     nitrofurantoin, macrocrystal-monohydrate, (MACROBID) 100 MG capsule, Take 100 mg by mouth 2 (two) times daily., Disp: , Rfl:     ONETOUCH VERIO Strp, , Disp: , Rfl:     oseltamivir (TAMIFLU) 75 MG capsule, Take 75 mg by mouth 2 (two) times daily., Disp: , Rfl:     pantoprazole (PROTONIX) 20 MG tablet, , Disp: , Rfl:     sitagliptin phosphate (JANUVIA ORAL), Januvia Take No date recorded No form recorded No frequency recorded No route recorded No set duration recorded No set duration amount recorded active No dosage strength recorded No dosage strength units of measure recorded, Disp: , Rfl:     SYNJARDY 12.5-1,000 mg Tab, , Disp: , Rfl:     traMADoL (ULTRAM) 50 mg tablet, , Disp: , Rfl:     triamcinolone acetonide 0.1% (KENALOG) 0.1 % cream, SMARTSIG:Topical 1 to 2 Times Daily PRN, Disp: , Rfl:     TRULICITY 1.5 mg/0.5 mL pen injector, Inject into the skin., Disp: , Rfl:     cetirizine (ZYRTEC) 10 MG tablet, Take 1 tablet (10 mg total) by mouth once daily. for 14 days, Disp: 14 tablet, Rfl: 0    estradioL (ESTRACE) 0.01 % (0.1 mg/gram) vaginal cream, Place 1 g vaginally once daily., Disp: 42.5 g, Rfl: 1    gabapentin (NEURONTIN) 300 MG capsule, Take 1 capsule by mouth nightly., Disp: , Rfl:     Current Facility-Administered Medications:     polidocanoL 1 % (20 mg/2 mL) injection Soln 0.1 mL, 0.1 mL, Intravenous, 1 time in Clinic/HOD, Yovany Mccauley MD    REVIEW OF SYSTEMS:  General: No fevers or chills; ENT: No sore throat; Allergy and Immunology: no persistent infections; Hematological and Lymphatic: No history of bleeding or easy bruising; Endocrine: negative; Respiratory: no cough, shortness of breath, or wheezing; Cardiovascular: no chest  pain or dyspnea on exertion; Gastrointestinal: no abdominal pain/back, change in bowel habits, or bloody stools; Genito-Urinary: no dysuria, trouble voiding, or hematuria; Musculoskeletal: edema; Neurological: no TIA or stroke symptoms; Psychiatric: no nervousness, anxiety or depression.    PHYSICAL EXAM:      Pulse: 85         General appearance:  Alert, well-appearing, and in no distress.  Oriented to person, place, and time                    Neurological: Normal speech, no focal findings noted; CN II - XII grossly intact. RLE with sensation to light touch, LLE with sensation to light touch.            Musculoskeletal: Digits/nail without cyanosis/clubbing.  Strength 5/5 BLE.                    Neck: Supple, no significant adenopathy                  Chest:  No wheezes, symmetric air entry. No use of accessory muscles               Cardiac: Normal rate and regular rhythm            Abdomen: Soft, nontender, nondistended      Extremities:   2+ R DP pulse, 2+ L DP pulse      1+ RLE edema, 1+ LLE edema    Skin:  RLE no ulcer; LLE no ulcer      RLE + spider veins, LLE + spider veins      RLE + varicose veins, LLE + varicose veins    CEAP 3/3        LAB RESULTS:  No results found for: CBC  No results found for: LABPROT, INR  Lab Results   Component Value Date     (L) 02/09/2023    K 3.6 02/09/2023    CL 96 02/09/2023    CO2 24 02/09/2023     (H) 02/09/2023    BUN 17 02/09/2023    CREATININE 1.0 02/09/2023    CALCIUM 10.7 (H) 02/09/2023    ANIONGAP 14 02/09/2023    EGFRNONAA >60 04/13/2022     Lab Results   Component Value Date    WBC 9.46 02/09/2023    RBC 5.20 02/09/2023    HGB 16.1 (H) 02/09/2023    HCT 47.1 02/09/2023    MCV 91 02/09/2023    MCH 31.0 02/09/2023    MCHC 34.2 02/09/2023    RDW 13.2 02/09/2023     02/09/2023    MPV 10.5 02/09/2023    GRAN 5.4 02/09/2023    GRAN 57.5 02/09/2023    LYMPH 2.8 02/09/2023    LYMPH 30.0 02/09/2023    MONO 0.7 02/09/2023    MONO 7.8 02/09/2023    EOS 0.3  02/09/2023    BASO 0.10 02/09/2023    EOSINOPHIL 3.3 02/09/2023    BASOPHIL 1.1 02/09/2023    DIFFMETHOD Automated 02/09/2023     .No results found for: HGBA1C    IMAGING:  All pertinent imaging has been reviewed and interpreted independently.    Venous US 2022 Impression:  Color flow evaluation of the right lower extremity demonstrates no evidence of venous thrombosis in the deep or superficial veins. Significant reflux is not noted in the GSV.  Reflux does not include the saphenofemoral junction (SFJ).   Significant reflux is not noted in the SSV.  Reflux does not include the saphenopopliteal junction (SPJ).  There is no evidence of reflux in the Accessory vein.  Color flow evaluation of the left lower extremity demonstrates no evidence of venous thrombosis in the deep or superficial veins. Significant reflux is not noted in the GSV.  Reflux does not include the saphenofemoral junction (SFJ).   Significant reflux is not noted in the SSV.  Reflux does not include the saphenopopliteal junction (SPJ).  There is no evidence of reflux in the Accessory vein.    IMP/PLAN:  63 y.o. female with There is no problem list on file for this patient.   being managed by PCP and specialists who is here today for evaluation of symptomatic BLE varicose and spider veins.    -symptomatic BLE varicose veins despite compression and elevation > 3 mo s/p RLE stab phlebectomy 6/2022  -recommend compression with Rx stockings, elevation, dietary changes associated with water and sodium intake discussed at length with patient  -Exercise   -BLE spider veins s/p RLE sclerotherapy  -rec LLE stab phlebectomy    I spent 12 minutes evaluating this patient and greater than 50% of the time was spent counseling, coordinator care and discussing the plan of care.  All questions were answered and patient stated understanding with agreement with the above treatment plan.    Yovany Mccauley MD [facility]  Vascular and Endovascular Surgery

## 2023-04-26 DIAGNOSIS — R41.89 COGNITIVE AND BEHAVIORAL CHANGES: Primary | ICD-10-CM

## 2023-04-26 DIAGNOSIS — R46.89 COGNITIVE AND BEHAVIORAL CHANGES: Primary | ICD-10-CM

## 2023-04-26 DIAGNOSIS — I78.1 SPIDER VEIN, SYMPTOMATIC: ICD-10-CM

## 2023-05-29 ENCOUNTER — DOCUMENTATION ONLY (OUTPATIENT)
Dept: PSYCHIATRY | Facility: CLINIC | Age: 63
End: 2023-05-29
Payer: MEDICAID

## 2023-07-03 DIAGNOSIS — F41.9 ANXIETY DUE TO INVASIVE PROCEDURE: Primary | ICD-10-CM

## 2023-07-03 RX ORDER — ALPRAZOLAM 0.5 MG/1
TABLET ORAL
Qty: 2 TABLET | Refills: 0 | Status: SHIPPED | OUTPATIENT
Start: 2023-07-03 | End: 2023-12-04

## 2023-07-07 ENCOUNTER — PROCEDURE VISIT (OUTPATIENT)
Dept: VASCULAR SURGERY | Facility: CLINIC | Age: 63
End: 2023-07-07
Payer: MEDICAID

## 2023-07-07 VITALS
DIASTOLIC BLOOD PRESSURE: 84 MMHG | SYSTOLIC BLOOD PRESSURE: 142 MMHG | HEART RATE: 100 BPM | BODY MASS INDEX: 27.11 KG/M2 | WEIGHT: 153 LBS | HEIGHT: 63 IN

## 2023-07-07 DIAGNOSIS — I78.1 SPIDER VEIN, SYMPTOMATIC: ICD-10-CM

## 2023-07-07 PROCEDURE — 36471 NJX SCLRSNT MLT INCMPTNT VN: CPT | Mod: LT,S$GLB,, | Performed by: SURGERY

## 2023-07-07 PROCEDURE — 36471 PR INJECTION THERAPY VEIN,MULT VEINS: ICD-10-PCS | Mod: LT,S$GLB,, | Performed by: SURGERY

## 2023-07-07 RX ORDER — SULFAMETHOXAZOLE AND TRIMETHOPRIM 800; 160 MG/1; MG/1
1 TABLET ORAL 2 TIMES DAILY
COMMUNITY
Start: 2023-04-06 | End: 2023-12-04

## 2023-07-07 RX ORDER — TIRZEPATIDE 5 MG/.5ML
INJECTION, SOLUTION SUBCUTANEOUS
COMMUNITY
Start: 2023-05-19 | End: 2024-02-07

## 2023-07-07 RX ORDER — HYDROCODONE BITARTRATE AND ACETAMINOPHEN 10; 300 MG/1; MG/1
1 TABLET ORAL
COMMUNITY
End: 2023-12-04

## 2023-07-07 RX ORDER — TIRZEPATIDE 7.5 MG/.5ML
INJECTION, SOLUTION SUBCUTANEOUS
COMMUNITY
Start: 2023-06-08

## 2023-07-07 RX ORDER — MUPIROCIN 20 MG/G
OINTMENT TOPICAL
COMMUNITY
Start: 2023-04-06 | End: 2023-12-04

## 2023-07-07 RX ORDER — MIRTAZAPINE 15 MG/1
15 TABLET, FILM COATED ORAL NIGHTLY
COMMUNITY
Start: 2023-06-26 | End: 2023-12-04

## 2023-07-07 RX ORDER — MOXIFLOXACIN 5 MG/ML
SOLUTION/ DROPS OPHTHALMIC
COMMUNITY
Start: 2023-04-25 | End: 2023-12-04

## 2023-07-07 RX ORDER — FLUOROMETHOLONE 1 MG/ML
SUSPENSION/ DROPS OPHTHALMIC
COMMUNITY
Start: 2023-04-28 | End: 2023-12-04

## 2023-07-07 RX ORDER — TIRZEPATIDE 2.5 MG/.5ML
INJECTION, SOLUTION SUBCUTANEOUS
COMMUNITY
Start: 2023-03-10 | End: 2024-02-07

## 2023-07-07 NOTE — PROCEDURES
07/07/2023    Pre-Procedure Diagnosis:   1. Left lower extremity spider veins  2. Left lower extremity reticular veins    Post-Procedure Diagnsosis: Same    Procedure: Left lower extremity sclerotherapy with Asclera 2%    Surgeon: MD QUIRINO Reece    Anesthesia: Asclera     Estimated blood loss: <5cc    Complications: None       Time out performed and patient was prepped and draped in sterile fashion.  Vein light used to identify reticular veins feeding the lower extremity spider veins of the lateral thigh, lateral lower leg, ankle.  Skin overlying affected areas cleaned with alcohol.  Reticular veins were then injected with 0.5% Asclera foam sclerosant created with Tessari method with resolution of reticular veins.  Next the spider veins were identified and the overlying skin was cleaned with alcohol.  The veins were accessed with a 30 gauge needle and injected with 0.25% Asclera liquid sclerosant with near resolution of all treated areas.  Once all areas of concern to the patient were treated, sterile cotton balls and Tegaderms were applied for light compression as well as ice for 5 minutes.  We then placed a Coban wrap over the treated areas followed by compression stocking.  The patient ambulated after the procedure without issues.      MD QUIRINO Reece  [facility]

## 2023-10-10 ENCOUNTER — OFFICE VISIT (OUTPATIENT)
Dept: VASCULAR SURGERY | Facility: CLINIC | Age: 63
End: 2023-10-10
Payer: MEDICAID

## 2023-10-10 VITALS
DIASTOLIC BLOOD PRESSURE: 84 MMHG | WEIGHT: 153 LBS | SYSTOLIC BLOOD PRESSURE: 157 MMHG | HEIGHT: 63 IN | BODY MASS INDEX: 27.11 KG/M2 | HEART RATE: 81 BPM

## 2023-10-10 DIAGNOSIS — I83.93 SPIDER VEINS OF BOTH LOWER EXTREMITIES: Primary | ICD-10-CM

## 2023-10-10 PROCEDURE — 99213 OFFICE O/P EST LOW 20 MIN: CPT | Mod: S$GLB,,, | Performed by: SURGERY

## 2023-10-10 PROCEDURE — 3008F PR BODY MASS INDEX (BMI) DOCUMENTED: ICD-10-PCS | Mod: CPTII,S$GLB,, | Performed by: SURGERY

## 2023-10-10 PROCEDURE — 3077F PR MOST RECENT SYSTOLIC BLOOD PRESSURE >= 140 MM HG: ICD-10-PCS | Mod: CPTII,S$GLB,, | Performed by: SURGERY

## 2023-10-10 PROCEDURE — 99213 PR OFFICE/OUTPT VISIT, EST, LEVL III, 20-29 MIN: ICD-10-PCS | Mod: S$GLB,,, | Performed by: SURGERY

## 2023-10-10 PROCEDURE — 3077F SYST BP >= 140 MM HG: CPT | Mod: CPTII,S$GLB,, | Performed by: SURGERY

## 2023-10-10 PROCEDURE — 1159F PR MEDICATION LIST DOCUMENTED IN MEDICAL RECORD: ICD-10-PCS | Mod: CPTII,S$GLB,, | Performed by: SURGERY

## 2023-10-10 PROCEDURE — 3008F BODY MASS INDEX DOCD: CPT | Mod: CPTII,S$GLB,, | Performed by: SURGERY

## 2023-10-10 PROCEDURE — 3079F PR MOST RECENT DIASTOLIC BLOOD PRESSURE 80-89 MM HG: ICD-10-PCS | Mod: CPTII,S$GLB,, | Performed by: SURGERY

## 2023-10-10 PROCEDURE — 1159F MED LIST DOCD IN RCRD: CPT | Mod: CPTII,S$GLB,, | Performed by: SURGERY

## 2023-10-10 PROCEDURE — 3079F DIAST BP 80-89 MM HG: CPT | Mod: CPTII,S$GLB,, | Performed by: SURGERY

## 2023-10-10 NOTE — PROGRESS NOTES
Yovany Mccaulye MD, RPVI                                 [facility]                           [facility]                             10/10/2023    HPI:  Jaelyn Peter is a 63 y.o. female with There is no problem list on file for this patient.   being managed by PCP and specialists who is here today for evaluation of BLE varicose veins.  Patient states location is BLE occurring for yrs.  Associated signs and symptoms include pain.  Quality is aching and severity is 6/10.  Symptoms began yrs ago.  Alleviating factors include elevation.  Worsening factors include dependency.  Patient is wearing compression stockings daily.  +FH of venous disease.  Symptoms do limit patient's functional status and daily activities.  no DVT history.  no venous interventions.  no low sodium diet.  no excessive water intake.    Migraine with aura: no  PFO/ASD/right to left shunt: no  Pregnant: no  Breastfeeding: no    no MI  no Stroke  Tobacco use: no    8/2022:  C/o BLE spider veins.    4/2023:  s/p Right lower extremity sclerotherapy 1/2023 and RLE stab phlebectomy 6/2022.  Doing well. Happy with results.  C/o LLE painful varicose veins and spider veins.    10/2023:  happy with BLE legs.  + compression    Past Medical History:   Diagnosis Date    Diabetes mellitus     Hypertension     Lupus     Thyroid disease      Past Surgical History:   Procedure Laterality Date    APPENDECTOMY      HERNIA REPAIR       Family History   Problem Relation Age of Onset    Diabetes Mother     Heart disease Mother     Cancer Father     Breast cancer Neg Hx      Social History     Socioeconomic History    Marital status:    Tobacco Use    Smoking status: Never    Smokeless tobacco: Never   Substance and Sexual Activity    Alcohol use: Yes    Drug use: No    Sexual activity: Not Currently     Partners: Male       Current Outpatient Medications:     albuterol (PROVENTIL/VENTOLIN HFA) 90 mcg/actuation inhaler,  "Inhale 2 puffs into the lungs every 6 (six) hours as needed for Wheezing. Cough and Shortness of Breath. Rescue, Disp: 6.7 g, Rfl: 1    amLODIPine (NORVASC) 5 MG tablet, Take 5 mg by mouth., Disp: , Rfl:     atorvastatin (LIPITOR) 10 MG tablet, , Disp: , Rfl:     azithromycin (Z-BLANCA) 250 MG tablet, Take by mouth., Disp: , Rfl:     BD ULTRA-FINE SHORT PEN NEEDLE 31 gauge x 5/16" Ndle, SMARTSIG:Pre-Filled Pen Syringe SUB-Q 4 Times Daily, Disp: , Rfl:     benzonatate (TESSALON) 100 MG capsule, Take 100-200 mg by mouth., Disp: , Rfl:     chlorhexidine (PERIDEX) 0.12 % solution, 15 mLs 2 (two) times daily., Disp: , Rfl:     clobetasol 0.05% (TEMOVATE) 0.05 % Oint, Apply topically 2 (two) times daily., Disp: 60 g, Rfl: 3    fluorometholone 0.1% (FML) 0.1 % DrpS, SMARTSI Drop(s) In Eye(s) Twice Daily, Disp: , Rfl:     fluticasone propionate (FLONASE) 50 mcg/actuation nasal spray, 2 sprays by Each Nostril route daily as needed., Disp: , Rfl:     glipiZIDE (GLUCOTROL) 5 MG TR24, , Disp: , Rfl:     HYDROcodone-acetaminophen  mg Tab, Take 1 tablet by mouth., Disp: , Rfl:     insulin aspart U-100 (NOVOLOG) 100 unit/mL (3 mL) InPn pen, SMARTSI Unit(s) SUB-Q 3 Times Daily, Disp: , Rfl:     irbesartan-hydrochlorothiazide (AVALIDE) 300-12.5 mg per tablet, , Disp: , Rfl:     LANTUS SOLOSTAR U-100 INSULIN glargine 100 units/mL (3mL) SubQ pen, Inject into the skin., Disp: , Rfl:     levothyroxine (SYNTHROID) 100 MCG tablet, , Disp: , Rfl:     meclizine (ANTIVERT) 25 mg tablet, Take 1 tablet (25 mg total) by mouth 3 (three) times daily as needed., Disp: 20 tablet, Rfl: 0    meloxicam (MOBIC) 7.5 MG tablet, Take 1 tablet by mouth by mouth every 12 hours as needed for pain, Disp: 14 tablet, Rfl: 0    metoclopramide HCl (REGLAN) 10 MG tablet, Take 1 tablet (10 mg total) by mouth every 6 (six) hours as needed (Nausea)., Disp: 14 tablet, Rfl: 0    MICRO THIN LANCETS 33 gauge Misc, , Disp: , Rfl:     mirtazapine (REMERON) 15 MG " tablet, Take 15 mg by mouth every evening., Disp: , Rfl:     MOUNJARO 2.5 mg/0.5 mL PnIj, Inject into the skin., Disp: , Rfl:     MOUNJARO 5 mg/0.5 mL PnIj, SMARTSI Milligram(s) SUB-Q Once a Week, Disp: , Rfl:     MOUNJARO 7.5 mg/0.5 mL PnIj, SMARTSI.5 Milligram(s) SUB-Q Once a Week, Disp: , Rfl:     moxifloxacin (VIGAMOX) 0.5 % ophthalmic solution, Place into both eyes., Disp: , Rfl:     mupirocin (BACTROBAN) 2 % ointment, Apply topically., Disp: , Rfl:     naproxen (NAPROSYN) 500 MG tablet, Take 500 mg by mouth 2 (two) times daily as needed., Disp: , Rfl:     nitrofurantoin, macrocrystal-monohydrate, (MACROBID) 100 MG capsule, Take 100 mg by mouth 2 (two) times daily., Disp: , Rfl:     ONETOUCH VERIO Strp, , Disp: , Rfl:     oseltamivir (TAMIFLU) 75 MG capsule, Take 75 mg by mouth 2 (two) times daily., Disp: , Rfl:     pantoprazole (PROTONIX) 20 MG tablet, , Disp: , Rfl:     sitagliptin phosphate (JANUVIA ORAL), Januvia Take No date recorded No form recorded No frequency recorded No route recorded No set duration recorded No set duration amount recorded active No dosage strength recorded No dosage strength units of measure recorded, Disp: , Rfl:     sulfamethoxazole-trimethoprim 800-160mg (BACTRIM DS) 800-160 mg Tab, Take 1 tablet by mouth 2 (two) times daily., Disp: , Rfl:     SYNJARDY 12.5-1,000 mg Tab, , Disp: , Rfl:     traMADoL (ULTRAM) 50 mg tablet, , Disp: , Rfl:     triamcinolone acetonide 0.1% (KENALOG) 0.1 % cream, SMARTSIG:Topical 1 to 2 Times Daily PRN, Disp: , Rfl:     TRULICITY 1.5 mg/0.5 mL pen injector, Inject into the skin., Disp: , Rfl:     ALPRAZolam (XANAX) 0.5 MG tablet, Take one tablet by mouth 1 hour before procedure and bring other tablet with you to the procedure. (Patient not taking: Reported on 10/10/2023), Disp: 2 tablet, Rfl: 0    ALPRAZolam (XANAX) 0.5 MG tablet, Take one tablet by mouth 1 hour before procedure and bring other tablet with you to the procedure. (Patient not  taking: Reported on 10/10/2023), Disp: 2 tablet, Rfl: 0    cetirizine (ZYRTEC) 10 MG tablet, Take 1 tablet (10 mg total) by mouth once daily. for 14 days, Disp: 14 tablet, Rfl: 0    estradioL (ESTRACE) 0.01 % (0.1 mg/gram) vaginal cream, Place 1 g vaginally once daily., Disp: 42.5 g, Rfl: 1    gabapentin (NEURONTIN) 300 MG capsule, Take 1 capsule by mouth nightly., Disp: , Rfl:     Current Facility-Administered Medications:     polidocanoL 1 % (20 mg/2 mL) injection Soln 0.1 mL, 0.1 mL, Intravenous, 1 time in Clinic/HOD, Yovany Mccauley MD    polidocanoL 1 % (20 mg/2 mL) injection Soln 0.2 mL, 0.2 mL, Intravenous, 1 time in Clinic/HOD, Yovany Mccauley MD    REVIEW OF SYSTEMS:  General: No fevers or chills; ENT: No sore throat; Allergy and Immunology: no persistent infections; Hematological and Lymphatic: No history of bleeding or easy bruising; Endocrine: negative; Respiratory: no cough, shortness of breath, or wheezing; Cardiovascular: no chest pain or dyspnea on exertion; Gastrointestinal: no abdominal pain/back, change in bowel habits, or bloody stools; Genito-Urinary: no dysuria, trouble voiding, or hematuria; Musculoskeletal: edema; Neurological: no TIA or stroke symptoms; Psychiatric: no nervousness, anxiety or depression.    PHYSICAL EXAM:      Pulse: 81         General appearance:  Alert, well-appearing, and in no distress.  Oriented to person, place, and time                    Neurological: Normal speech, no focal findings noted; CN II - XII grossly intact. RLE with sensation to light touch, LLE with sensation to light touch.            Musculoskeletal: Digits/nail without cyanosis/clubbing.  Strength 5/5 BLE.                    Neck: Supple, no significant adenopathy                  Chest:  No wheezes, symmetric air entry. No use of accessory muscles               Cardiac: Normal rate and regular rhythm            Abdomen: Soft, nontender, nondistended      Extremities:   2+ R DP pulse, 2+ L  "DP pulse      1+ RLE edema, 1+ LLE edema    Skin:  RLE no ulcer; LLE no ulcer      RLE improved spider veins, LLE improved spider veins      RLE + varicose veins, LLE + varicose veins    CEAP 3/3        LAB RESULTS:  No results found for: "CBC"  No results found for: "LABPROT", "INR"  Lab Results   Component Value Date     (L) 02/09/2023    K 3.6 02/09/2023    CL 96 02/09/2023    CO2 24 02/09/2023     (H) 02/09/2023    BUN 17 02/09/2023    CREATININE 1.0 02/09/2023    CALCIUM 10.7 (H) 02/09/2023    ANIONGAP 14 02/09/2023    EGFRNONAA >60 04/13/2022     Lab Results   Component Value Date    WBC 9.46 02/09/2023    RBC 5.20 02/09/2023    HGB 16.1 (H) 02/09/2023    HCT 47.1 02/09/2023    MCV 91 02/09/2023    MCH 31.0 02/09/2023    MCHC 34.2 02/09/2023    RDW 13.2 02/09/2023     02/09/2023    MPV 10.5 02/09/2023    GRAN 5.4 02/09/2023    GRAN 57.5 02/09/2023    LYMPH 2.8 02/09/2023    LYMPH 30.0 02/09/2023    MONO 0.7 02/09/2023    MONO 7.8 02/09/2023    EOS 0.3 02/09/2023    BASO 0.10 02/09/2023    EOSINOPHIL 3.3 02/09/2023    BASOPHIL 1.1 02/09/2023    DIFFMETHOD Automated 02/09/2023     .No results found for: "HGBA1C"    IMAGING:  All pertinent imaging has been reviewed and interpreted independently.    Venous US 2022 Impression:  Color flow evaluation of the right lower extremity demonstrates no evidence of venous thrombosis in the deep or superficial veins. Significant reflux is not noted in the GSV.  Reflux does not include the saphenofemoral junction (SFJ).   Significant reflux is not noted in the SSV.  Reflux does not include the saphenopopliteal junction (SPJ).  There is no evidence of reflux in the Accessory vein.  Color flow evaluation of the left lower extremity demonstrates no evidence of venous thrombosis in the deep or superficial veins. Significant reflux is not noted in the GSV.  Reflux does not include the saphenofemoral junction (SFJ).   Significant reflux is not noted in the SSV.  " Reflux does not include the saphenopopliteal junction (SPJ).  There is no evidence of reflux in the Accessory vein.    IMP/PLAN:  63 y.o. female with There is no problem list on file for this patient.   being managed by PCP and specialists who is here today for evaluation of symptomatic BLE varicose and spider veins.    -symptomatic BLE varicose veins despite compression and elevation > 3 mo s/p RLE stab phlebectomy 6/2022  -recommend compression with Rx stockings, elevation, dietary changes associated with water and sodium intake discussed at length with patient  -Exercise   -BLE spider veins s/p BLE sclerotherapy  -future LLE stab phlebectomy  -Derm referral for dermal laser  -RTC 4 mo    I spent 12 minutes evaluating this patient and greater than 50% of the time was spent counseling, coordinator care and discussing the plan of care.  All questions were answered and patient stated understanding with agreement with the above treatment plan.    Yovany Mccauley MD [facility]  Vascular and Endovascular Surgery

## 2023-10-13 DIAGNOSIS — I83.93 SPIDER VEINS OF BOTH LOWER EXTREMITIES: Primary | ICD-10-CM

## 2023-11-15 ENCOUNTER — TELEPHONE (OUTPATIENT)
Dept: GASTROENTEROLOGY | Facility: CLINIC | Age: 63
End: 2023-11-15
Payer: MEDICAID

## 2023-11-15 ENCOUNTER — PATIENT MESSAGE (OUTPATIENT)
Dept: GASTROENTEROLOGY | Facility: CLINIC | Age: 63
End: 2023-11-15
Payer: MEDICAID

## 2023-11-15 RX ORDER — SODIUM PICOSULFATE, MAGNESIUM OXIDE, AND ANHYDROUS CITRIC ACID 12; 3.5; 1 G/175ML; G/175ML; MG/175ML
1 LIQUID ORAL ONCE
Qty: 175 ML | Refills: 0 | Status: SHIPPED | OUTPATIENT
Start: 2023-11-15 | End: 2023-11-15

## 2023-11-15 NOTE — LETTER
November 15, 2023    Jaelyn Prerna Peter  [address] [facility] [facility]  [place] [address]             [facility]  [address], [place]   [place] [address]  Phone: [phone]  Fax: [phone] Dear Ms. Peter:    **MOUNJARO - last dose will be 11/26 then hold until after procedure!**    CLENPIQ Instructions    You are scheduled for a colonoscopy with Dr. Borja on 12/7/2023 at [facility]. Enter through the [facility] Entrance and check in at Same Day Surgery.  To ensure that your test is accurate and complete, you MUST follow these instructions listed below.  If you have any questions, please call our office at [phone].  Plan on being at the hospital for your procedure for 3-4 hours.       IF YOU HAVE ANY QUESTIONS ABOUT YOUR ARRIVAL TIME YOU CAN CALL [phone].    1.  Follow a CLEAR LIQUID DIET for the entire day before your scheduled colonoscopy.  This means no solid food the entire day starting when you wake.  You may have as much of the clear liquids as you want throughout the day.   CLEAR LIQUID DIET:      - NO DAIRY   - You can have:  Coffee with sugar (no creamer), tea, water, soda, apple or white grape juice, chicken or beef broth/bouillon (no meat, noodles, or veggies), popsicles, Jell-O, lemonade.    2.  AT 5 pm the evening before your colonoscopy, OPEN ONE (1) BOTTLE OF CLENPIQ AND DRINK THE ENTIRE BOTTLE.  DRINK FIVE (5) 8-OUNCE GLASSES OF WATER (40 OUNCES TOTAL) OVER THE NEXT FIVE (5) HOURS.     3.  The endoscopy department will call you 1 day before your colonoscopy to tell you the exact time to arrive, AND to tell you the exact time to drink the 2nd portion of your prep (which will be FIVE HOURS BEFORE YOUR ARRIVAL TIME).  At this time given to you, OPEN THE OTHER ONE (1) BOTTLE OF CLENPIQ AND DRINK THE ENTIRE BOTTLE.  DRINK THREE (3) 8-OUNCE GLASSES OF WATER (24 OUNCES TOTAL) OVER THE NEXT THREE (3) HOURS. Once this is complete, you can not  have anything else by mouth!    4.  You must have someone with you to DRIVE YOU HOME since you will be receiving IV sedation for the colonoscopy.    5.  It is ok to take MOST of your REGULAR MEDICATIONS  in the morning of your test with a SIP of water.  THE ONLY MEDS YOU NEED TO HOLD ARE YOUR DIABETES MEDICATIONS,  SOME BLOOD PRESSURE MEDS, AND BLOOD THINNERS IF OK'D BY YOUR DOCTOR.  Do NOT have anything else to eat or drink the morning of your colonoscopy.  It is ok to brush your teeth.    6.  If you are on blood thinners THAT YOU HAVE BEEN INSTRUCTED TO HOLD BY YOUR DOCTOR FOR THIS PROCEDURE, then do NOT take this the morning of your colonoscopy.  Do NOT stop these medications on your own, they must be approved to be held by your doctor.  Your colonoscopy can NOT be done if you are on these medications.  Examples of blood thinners include: Coumadin, Aggrenox, Plavix, Pradaxa, Reapro, Pletal, Xarelto, Ticagrelor, Brilinta, Eliquis, and high dose aspirin (325 mg).  You do not have to stop baby aspirin 81 mg.    7.  IF YOU ARE DIABETIC:  NO INSULIN OR ORAL MEDICATIONS THE MORNING OF THE COLONOSCOPY.  TAKE ONLY HALF THE DOSE OF YOUR INSULIN THE DAY BEFORE THE COLONOSCOPY.  DO NOT TAKE ANY ORAL DIABETIC MEDICATIONS THE DAY BEFORE THE COLONOSCOPY.  IF YOU ARE AN INSULIN DEPENDENT DIABETIC WITH UNSTABLE BLOOD SUGARS, NOTIFY YOUR PRIMARY CARE PHYSICIAN FOR INSTRUCTIONS.    8.  Please DO use your inhalers the morning of your procedure.       If you have any questions or concerns, please don't hesitate to call.    Sincerely,        Sarah Borja MD

## 2023-11-15 NOTE — TELEPHONE ENCOUNTER
Referring Physician: Bibiana Rainey MD                              Date: 11/15/2023    Reason for Referral: screening colonoscopy      Family History of:   Colon polyp: no  Relationship/Age of Onset:       Colon cancer: no  Relationship/Age of Onset:       Patient with:   Hemoccults Done: no      Iron deficient:   no      On Blood Thinner: no      Valvular heart disease/valve replacement: no      Anemia Present: no      On NSAID: no    On Adipex or phentermine: no     On Ozempic: no    **MOUNJARO - last dose will be 11/26 then hold until after procedure!**     Lung disease: no      Kidney disease: no     Hx of Crohn's or Ulcerative colitis:no     Hx of polyps: no      Hx of colon cancer: no      Previous colon evalations: yes  When:   Where:   Pertinent symptoms:           Review of patient's allergies indicates: codeine        Patient was scheduled for colonoscopy on 12/7/2023 with Dr. Borja at [facility]. CLENPIQ instructions were reviewed with patient.       Pharmacy is [facility] in [place]    **MOUNJARO - last dose will be 11/26 then hold until after procedure!**    CLENPIQ Instructions    You are scheduled for a colonoscopy with Dr. Borja on 12/7/2023 at [facility]. Enter through the [facility] Entrance and check in at Same Day Surgery.  To ensure that your test is accurate and complete, you MUST follow these instructions listed below.  If you have any questions, please call our office at [phone].  Plan on being at the hospital for your procedure for 3-4 hours.       IF YOU HAVE ANY QUESTIONS ABOUT YOUR ARRIVAL TIME YOU CAN CALL [phone].    1.  Follow a CLEAR LIQUID DIET for the entire day before your scheduled colonoscopy.  This means no solid food the entire day starting when you wake.  You may have as much of the clear liquids as you want throughout the day.   CLEAR LIQUID DIET:      - NO DAIRY   - You can have:  Coffee with sugar (no creamer), tea, water, soda, apple or white grape  juice, chicken or beef broth/bouillon (no meat, noodles, or veggies), popsicles, Jell-O, lemonade.    2.  AT 5 pm the evening before your colonoscopy, OPEN ONE (1) BOTTLE OF CLENPIQ AND DRINK THE ENTIRE BOTTLE.  DRINK FIVE (5) 8-OUNCE GLASSES OF WATER (40 OUNCES TOTAL) OVER THE NEXT FIVE (5) HOURS.     3.  The endoscopy department will call you 1 day before your colonoscopy to tell you the exact time to arrive, AND to tell you the exact time to drink the 2nd portion of your prep (which will be FIVE HOURS BEFORE YOUR ARRIVAL TIME).  At this time given to you, OPEN THE OTHER ONE (1) BOTTLE OF CLENPIQ AND DRINK THE ENTIRE BOTTLE.  DRINK THREE (3) 8-OUNCE GLASSES OF WATER (24 OUNCES TOTAL) OVER THE NEXT THREE (3) HOURS. Once this is complete, you can not have anything else by mouth!    4.  You must have someone with you to DRIVE YOU HOME since you will be receiving IV sedation for the colonoscopy.    5.  It is ok to take MOST of your REGULAR MEDICATIONS  in the morning of your test with a SIP of water.  THE ONLY MEDS YOU NEED TO HOLD ARE YOUR DIABETES MEDICATIONS,  SOME BLOOD PRESSURE MEDS, AND BLOOD THINNERS IF OK'D BY YOUR DOCTOR.  Do NOT have anything else to eat or drink the morning of your colonoscopy.  It is ok to brush your teeth.    6.  If you are on blood thinners THAT YOU HAVE BEEN INSTRUCTED TO HOLD BY YOUR DOCTOR FOR THIS PROCEDURE, then do NOT take this the morning of your colonoscopy.  Do NOT stop these medications on your own, they must be approved to be held by your doctor.  Your colonoscopy can NOT be done if you are on these medications.  Examples of blood thinners include: Coumadin, Aggrenox, Plavix, Pradaxa, Reapro, Pletal, Xarelto, Ticagrelor, Brilinta, Eliquis, and high dose aspirin (325 mg).  You do not have to stop baby aspirin 81 mg.    7.  IF YOU ARE DIABETIC:  NO INSULIN OR ORAL MEDICATIONS THE MORNING OF THE COLONOSCOPY.  TAKE ONLY HALF THE DOSE OF YOUR INSULIN THE DAY BEFORE THE COLONOSCOPY.   DO NOT TAKE ANY ORAL DIABETIC MEDICATIONS THE DAY BEFORE THE COLONOSCOPY.  IF YOU ARE AN INSULIN DEPENDENT DIABETIC WITH UNSTABLE BLOOD SUGARS, NOTIFY YOUR PRIMARY CARE PHYSICIAN FOR INSTRUCTIONS.    8.  Please DO use your inhalers the morning of your procedure.

## 2023-12-04 NOTE — PROGRESS NOTES
Pre-op Instructions    Date of Surgery:  12/7/23  Time to Arrive:  TBD  (we will call you the day before your procedure with your arrival time)    [address], [place] [address]  Park in the [facility] Parking Lot and come in through the [place] [address] Entrance    Pre-Admit: [phone]    **MOUNJARO - last dose will be 11/26 then hold until after procedure!**     CLENPIQ Instructions     You are scheduled for a colonoscopy with Dr. Borja on 12/7/2023 at [facility]. Enter through the [facility] Entrance and check in at Same Day Surgery.  To ensure that your test is accurate and complete, you MUST follow these instructions listed below.  If you have any questions, please call our office at [phone].  Plan on being at the hospital for your procedure for 3-4 hours.         IF YOU HAVE ANY QUESTIONS ABOUT YOUR ARRIVAL TIME YOU CAN CALL [phone].     1.  Follow a CLEAR LIQUID DIET for the entire day before your scheduled colonoscopy.  This means no solid food the entire day starting when you wake.  You may have as much of the clear liquids as you want throughout the day.              CLEAR LIQUID DIET:                            - NO DAIRY              - You can have:  Coffee with sugar (no creamer), tea, water, soda, apple or white grape juice, chicken or beef broth/bouillon (no meat, noodles, or veggies), popsicles, Jell-O, lemonade.     2.  AT 5 pm the evening before your colonoscopy, OPEN ONE (1) BOTTLE OF CLENPIQ AND DRINK THE ENTIRE BOTTLE.  DRINK FIVE (5) 8-OUNCE GLASSES OF WATER (40 OUNCES TOTAL) OVER THE NEXT FIVE (5) HOURS.      3.  The endoscopy department will call you 1 day before your colonoscopy to tell you the exact time to arrive, AND to tell you the exact time to drink the 2nd portion of your prep (which will be FIVE HOURS BEFORE YOUR ARRIVAL TIME).  At this time given to you, OPEN THE OTHER ONE (1) BOTTLE OF CLENPIQ AND DRINK THE ENTIRE BOTTLE.  DRINK THREE (3)  8-OUNCE GLASSES OF WATER (24 OUNCES TOTAL) OVER THE NEXT THREE (3) HOURS. Once this is complete, you can not have anything else by mouth!     4.  You must have someone with you to DRIVE YOU HOME since you will be receiving IV sedation for the colonoscopy.     5.  It is ok to take MOST of your REGULAR MEDICATIONS  in the morning of your test with a SIP of water.  THE ONLY MEDS YOU NEED TO HOLD ARE YOUR DIABETES MEDICATIONS,         SOME BLOOD PRESSURE MEDS, AND BLOOD THINNERS IF OK'D BY YOUR DOCTOR.  Do NOT have anything else to eat or drink the morning of your colonoscopy.  It is ok to brush your teeth.     6.  If you are on blood thinners THAT YOU HAVE BEEN INSTRUCTED TO HOLD BY YOUR DOCTOR FOR THIS PROCEDURE, then do NOT take this the morning of your colonoscopy.  Do NOT stop these medications on your own, they must be approved to be held by your doctor.  Your colonoscopy can NOT be done if you are on these medications.  Examples of blood thinners include: Coumadin, Aggrenox, Plavix, Pradaxa, Reapro, Pletal, Xarelto, Ticagrelor, Brilinta, Eliquis, and high dose aspirin (325 mg).  You do not have to stop baby aspirin 81 mg.     7.  IF YOU ARE DIABETIC:  NO INSULIN OR ORAL MEDICATIONS THE MORNING OF THE COLONOSCOPY.  TAKE ONLY HALF THE DOSE OF YOUR INSULIN THE DAY BEFORE THE COLONOSCOPY.  DO NOT TAKE ANY ORAL DIABETIC MEDICATIONS THE DAY BEFORE THE COLONOSCOPY.  IF YOU ARE AN INSULIN DEPENDENT DIABETIC WITH UNSTABLE BLOOD SUGARS, NOTIFY YOUR PRIMARY CARE PHYSICIAN FOR INSTRUCTIONS.     8.  Please DO use your inhalers the morning of your procedure.     Diet:  Nothing to eat after midnight before your surgery.  This means no solid food, no candy, no gum, no cloudy liquids, and no vitamins.  You may have water, clear sports drinks, and clear juice (NO PULP) up to 2 HOURS BEFORE YOUR ARRIVAL TIME.  You are encouraged to stay hydrated with the liquids, but in a reasonable amount.  Take your morning medications with a sip  "of water or sports drink/juice.  Do not put ANYTHING in your mouth 2 hours before your arrival time.  Nothing.  YOU CAN NOT HAVE CREAMER IN YOUR COFFEE MORNING OF SURGERY.    Medications:  Take all of your medications the day PRIOR to your procedure except blood thinners if you have been cleared to hold them.  You may take Tylenol and most other pain relievers the day prior to your procedure unless you have specifically been told by your provider not to do so.  TAKE THESE MEDS THE MORNING OF YOUR SURGERY:   a.  Inhaler   b.  Levothyroxine    4.   Do not drink ANY alcohol or take any "mind altering drugs" for 24 hours before your   procedure.  5.   GLP-1 Agonist- Hold Mounjaro for 7 days prior to procedure with last dose on 11/26/23.    Other:  YOU MUST HAVE SOMEONE TO DRIVE YOU HOME FROM SURGERY.  Leave all valuables at home  Come dressed comfortably  Remove finger nail polish please!  Remove toe nail polish if having surgery to your lower extremity.  Bathe like you normally do the morning or night before surgery, preferably with an anti-bacterial soap.  You may brush your teeth before coming to the hospital BUT do NOT swallow any of this!  Only 1 visitor over the age of 18 will be allowed on the unit with the patient.  Children are not allowed.  "

## 2023-12-06 ENCOUNTER — TELEPHONE (OUTPATIENT)
Dept: GASTROENTEROLOGY | Facility: CLINIC | Age: 63
End: 2023-12-06
Payer: OTHER MISCELLANEOUS

## 2023-12-06 NOTE — TELEPHONE ENCOUNTER
instructed pt of arrival time of 0800 in SDS. Informed pt 1st prep due @5pm. 2nd prep due @0300. Confirmed pt on clear liquids.

## 2024-02-07 ENCOUNTER — OFFICE VISIT (OUTPATIENT)
Dept: VASCULAR SURGERY | Facility: CLINIC | Age: 64
End: 2024-02-07
Payer: MEDICARE

## 2024-02-07 ENCOUNTER — TELEPHONE (OUTPATIENT)
Dept: VASCULAR SURGERY | Facility: CLINIC | Age: 64
End: 2024-02-07

## 2024-02-07 ENCOUNTER — HOSPITAL ENCOUNTER (EMERGENCY)
Facility: OTHER | Age: 64
Discharge: HOME | End: 2024-02-07
Attending: EMERGENCY MEDICINE
Payer: MEDICARE

## 2024-02-07 VITALS
WEIGHT: 160.06 LBS | DIASTOLIC BLOOD PRESSURE: 102 MMHG | SYSTOLIC BLOOD PRESSURE: 190 MMHG | HEIGHT: 63 IN | BODY MASS INDEX: 28.36 KG/M2 | HEART RATE: 72 BPM

## 2024-02-07 VITALS
HEIGHT: 63 IN | DIASTOLIC BLOOD PRESSURE: 76 MMHG | WEIGHT: 155 LBS | TEMPERATURE: 98 F | BODY MASS INDEX: 27.46 KG/M2 | HEART RATE: 87 BPM | OXYGEN SATURATION: 99 % | RESPIRATION RATE: 20 BRPM | SYSTOLIC BLOOD PRESSURE: 181 MMHG

## 2024-02-07 DIAGNOSIS — I83.813 VARICOSE VEINS OF LEG WITH PAIN, BILATERAL: ICD-10-CM

## 2024-02-07 DIAGNOSIS — R07.9 CHEST PAIN: ICD-10-CM

## 2024-02-07 DIAGNOSIS — I83.891 VARICOSE VEINS OF LEG WITH SWELLING, RIGHT: Primary | ICD-10-CM

## 2024-02-07 DIAGNOSIS — I10 HYPERTENSION: Primary | ICD-10-CM

## 2024-02-07 DIAGNOSIS — I83.93 SPIDER VEINS OF BOTH LOWER EXTREMITIES: ICD-10-CM

## 2024-02-07 LAB
ALBUMIN SERPL BCP-MCNC: 3.7 G/DL (ref 3.5–5.2)
ALP SERPL-CCNC: 86 U/L (ref 55–135)
ALT SERPL W/O P-5'-P-CCNC: 75 U/L (ref 10–44)
ANION GAP SERPL CALC-SCNC: 11 MMOL/L (ref 8–16)
AST SERPL-CCNC: 44 U/L (ref 10–40)
BACTERIA #/AREA URNS HPF: ABNORMAL /HPF
BASOPHILS # BLD AUTO: 0.11 K/UL (ref 0–0.2)
BASOPHILS NFR BLD: 1.2 % (ref 0–1.9)
BILIRUB SERPL-MCNC: 0.6 MG/DL (ref 0.1–1)
BILIRUB UR QL STRIP: NEGATIVE
BUN SERPL-MCNC: 16 MG/DL (ref 8–23)
CALCIUM SERPL-MCNC: 10.1 MG/DL (ref 8.7–10.5)
CHLORIDE SERPL-SCNC: 105 MMOL/L (ref 95–110)
CLARITY UR: CLEAR
CO2 SERPL-SCNC: 21 MMOL/L (ref 23–29)
COLOR UR: COLORLESS
CREAT SERPL-MCNC: 0.7 MG/DL (ref 0.5–1.4)
DIFFERENTIAL METHOD BLD: NORMAL
EOSINOPHIL # BLD AUTO: 0.5 K/UL (ref 0–0.5)
EOSINOPHIL NFR BLD: 5.2 % (ref 0–8)
ERYTHROCYTE [DISTWIDTH] IN BLOOD BY AUTOMATED COUNT: 13.4 % (ref 11.5–14.5)
EST. GFR  (NO RACE VARIABLE): >60 ML/MIN/1.73 M^2
GLUCOSE SERPL-MCNC: 79 MG/DL (ref 70–110)
GLUCOSE UR QL STRIP: ABNORMAL
HCT VFR BLD AUTO: 42.3 % (ref 37–48.5)
HGB BLD-MCNC: 14 G/DL (ref 12–16)
HGB UR QL STRIP: NEGATIVE
IMM GRANULOCYTES # BLD AUTO: 0.02 K/UL (ref 0–0.04)
IMM GRANULOCYTES NFR BLD AUTO: 0.2 % (ref 0–0.5)
KETONES UR QL STRIP: NEGATIVE
LEUKOCYTE ESTERASE UR QL STRIP: ABNORMAL
LYMPHOCYTES # BLD AUTO: 2.7 K/UL (ref 1–4.8)
LYMPHOCYTES NFR BLD: 28.2 % (ref 18–48)
MCH RBC QN AUTO: 30 PG (ref 27–31)
MCHC RBC AUTO-ENTMCNC: 33.1 G/DL (ref 32–36)
MCV RBC AUTO: 91 FL (ref 82–98)
MICROSCOPIC COMMENT: ABNORMAL
MONOCYTES # BLD AUTO: 0.8 K/UL (ref 0.3–1)
MONOCYTES NFR BLD: 8 % (ref 4–15)
NEUTROPHILS # BLD AUTO: 5.4 K/UL (ref 1.8–7.7)
NEUTROPHILS NFR BLD: 57.2 % (ref 38–73)
NITRITE UR QL STRIP: NEGATIVE
NRBC BLD-RTO: 0 /100 WBC
PH UR STRIP: 6 [PH] (ref 5–8)
PLATELET # BLD AUTO: 269 K/UL (ref 150–450)
PMV BLD AUTO: 9.5 FL (ref 9.2–12.9)
POTASSIUM SERPL-SCNC: 4.1 MMOL/L (ref 3.5–5.1)
PROT SERPL-MCNC: 7.3 G/DL (ref 6–8.4)
PROT UR QL STRIP: NEGATIVE
RBC # BLD AUTO: 4.67 M/UL (ref 4–5.4)
SODIUM SERPL-SCNC: 137 MMOL/L (ref 136–145)
SP GR UR STRIP: 1.01 (ref 1–1.03)
SQUAMOUS #/AREA URNS HPF: 2 /HPF
TROPONIN I SERPL DL<=0.01 NG/ML-MCNC: <0.006 NG/ML (ref 0–0.03)
URN SPEC COLLECT METH UR: ABNORMAL
UROBILINOGEN UR STRIP-ACNC: NEGATIVE EU/DL
WBC # BLD AUTO: 9.39 K/UL (ref 3.9–12.7)
WBC #/AREA URNS HPF: 7 /HPF (ref 0–5)
YEAST URNS QL MICRO: ABNORMAL

## 2024-02-07 PROCEDURE — 84484 ASSAY OF TROPONIN QUANT: CPT | Performed by: EMERGENCY MEDICINE

## 2024-02-07 PROCEDURE — 93010 ELECTROCARDIOGRAM REPORT: CPT | Mod: ,,, | Performed by: INTERNAL MEDICINE

## 2024-02-07 PROCEDURE — 85025 COMPLETE CBC W/AUTO DIFF WBC: CPT | Performed by: EMERGENCY MEDICINE

## 2024-02-07 PROCEDURE — 99213 OFFICE O/P EST LOW 20 MIN: CPT | Mod: S$GLB,,, | Performed by: SURGERY

## 2024-02-07 PROCEDURE — 3008F BODY MASS INDEX DOCD: CPT | Mod: CPTII,S$GLB,, | Performed by: SURGERY

## 2024-02-07 PROCEDURE — 3077F SYST BP >= 140 MM HG: CPT | Mod: CPTII,S$GLB,, | Performed by: SURGERY

## 2024-02-07 PROCEDURE — 93005 ELECTROCARDIOGRAM TRACING: CPT

## 2024-02-07 PROCEDURE — 99285 EMERGENCY DEPT VISIT HI MDM: CPT | Mod: 25

## 2024-02-07 PROCEDURE — 81000 URINALYSIS NONAUTO W/SCOPE: CPT | Performed by: EMERGENCY MEDICINE

## 2024-02-07 PROCEDURE — 3080F DIAST BP >= 90 MM HG: CPT | Mod: CPTII,S$GLB,, | Performed by: SURGERY

## 2024-02-07 PROCEDURE — 80053 COMPREHEN METABOLIC PANEL: CPT | Performed by: EMERGENCY MEDICINE

## 2024-02-07 NOTE — DISCHARGE INSTRUCTIONS
Thank you for letting us take care of you today! It was nice meeting you, and I hope you feel better soon.     Call your primary care doctor to make the first available appointment.     Keep all your medical appointments.     Take your regular medication as prescribed. Contact your primary care provider before running out of medication, or for any problems obtaining them.    Do not drive or operate heavy machinery while taking opioid or muscle relaxing medications. Examples include norco, percocet, xanax, valium, flexeril.     Overuse or long term use of pain and sedating medication may lead to addiction, dependence, organ failure, family and work problems, legal problems, accidental overdose, and death.    If you do not have health insurance, you probably can afford it:  Call 1-124.952.7559 ([facility] hotline) or go to www.[employer].[place].gov    Your evaluation in the ER does not suggest any emergent or life threatening medical condition requiring admission or immediate intervention beyond that provided in the ER.   However, the signs of a serious problem sometimes take more time to appear.     Do not hesitate to return to the ER if any of the following occur:    Weakness, dizziness, fainting, or loss of consciousness   Fever of 100.4ºF (38ºC) or higher  Any worse symptoms  Any new or concerning symptoms      Call your primary care doctor to make the first available appointment.     Keep all your medical appointments.     Take your regular medication as prescribed. Contact your primary care provider before running out of medication, or for any problems obtaining them.    Do not drive or operate heavy machinery while taking norco, percocet, valium, flexeril or other opioid and sedating medications.     Prolonged or overuse of pain and sedating medication may lead to addiction, dependence, organ failure, family and work problems, legal problems, accidental overdose and death.    If you do not have health insurance, you probably  qualify for heavily discounted rates:  Call 1-135.166.1977 ([facility] hotline) or go to www.healthy.[place].gov    Your evaluation in the ED does not suggest any emergent or life threatening medical condition requiring admission or immediate intervention beyond that provided in the ED.   However, the signs of a serious problem sometimes take more time to appear.   RETURN TO THE ER if any of the following occur:    New or worse pain: if it feels different, becomes more severe, lasts longer, or begins to spread into your shoulder, arm, neck, jaw or back   Shortness of breath or increased pain with breathing   Cough with dark colored sputum (phlegm) or blood   Weakness, dizziness, fainting, or loss of consciousness   Fever of 100.4ºF (38ºC) or higher  Any new or concerning symptoms        Discharge Instructions for High Blood Pressure (Hypertension)  You have been diagnosed with high blood pressure (also called hypertension). This means the force of blood against your artery walls is too strong. It also means your heart is working hard to move blood. High blood pressure usually has no symptoms. Over many years, it can damage your heart, blood vessels, eyes, kidneys, and other organs. With help from your doctor, you can manage your blood pressure and protect your health.  Taking medications  Learn to take your own blood pressure. Take it once or twice a day. Keep a record of your results, and bring the record to your doctor's appointment.   Take your blood pressure medication exactly as directed. Dont skip doses. Missing doses can cause your blood pressure to get out of control.  Avoid medications that contain heart stimulants, including over-the-counter drugs. Check for warnings about high blood pressure on the label.  Check with your doctor before taking a decongestant. Some decongestants can worsen high blood pressure.  Lifestyle changes  Maintain a healthy weight. Get help to lose any extra pounds.  Cut back on salt.  Limit  canned, dried, packaged, and fast foods.  Dont add salt to your food at the table.  Season foods with herbs instead of salt when you cook.  Follow the DASH (Dietary Approaches to Stop Hypertension) eating plan. This plan recommends vegetables, fruits, whole gains, and other heart healthy foods.  Begin an exercise program. Ask your doctor how to get started. The American Heart Association recommends aerobic exercise 3 to 4 times a week for an average of 40 minutes at a time, with your doctor's approval. Simple activities like walking or gardening can help.  Break the smoking habit. Enroll in a stop-smoking program to improve your chances of success. Ask your health care provider about programs and medications to help you stop smoking.  Limit drinks that contain caffeine (coffee, black or green tea, cola) to 2 per day.  Never take stimulants such as amphetamines or cocaine; these drugs can be deadly for someone with high blood pressure.  Control your stress. Learn stress-management techniques.  Limit alcohol to no more than 1 drink a day for women and 2 drinks a day for men.  Follow-up care  Make a follow-up appointment for recheck with your primary doctor within one week.

## 2024-02-07 NOTE — ED PROVIDER NOTES
"  Source of History:  Medical record, patient      Chief complaint:  Per triage note: "Hypertension (Pt. C/o of a light headache and blurry vision started this morning.Sent over from clinic for eval. For HTN)  "    HPI:    Patient presents sent from outpatient clinic for evaluation of elevated blood pressure.  Patient reports she had minimal headache yesterday which is currently resolved.  She also describes an episode of mild chest pain associated with left arm discomfort for about 3 minutes immediately after she took her evening medications.  When asked about other episodes of chest pain, she states that she intermittently has mild, barely perceptible, transient episodes of chest pain chronically that she has not been concerned about.  She feels that some of these episodes are likely due to her chronic right shoulder pain due to supraspinatus tendon rupture.   She denies any recent changes in her medications.  Patient was recently in [facility] for business; returned a week ago.  The longest leg of her travel was a 4.5 hour flight.  She denies any associated dyspnea, leg edema.  She denies any focal deficits including blurred vision.  I interviewed pt in pt's native language (Estonian), which I am fluent in.      ROS:   See HPI for pertinent review of systems    Review of patient's allergies indicates:   Allergen Reactions    Codeine Shortness Of Breath       PMH:  As per HPI and below:  Past Medical History:   Diagnosis Date    Diabetes mellitus     Hypertension     Lupus     Thyroid disease        Past Surgical History:   Procedure Laterality Date    APPENDECTOMY      HERNIA REPAIR         Social History     Tobacco Use    Smoking status: Never    Smokeless tobacco: Never   Substance Use Topics    Alcohol use: Not Currently    Drug use: Never       Physical Exam:      Nursing note and vitals reviewed.  BP (!) 168/78   Pulse 70   Temp 98 °F (36.7 °C) (Oral)   Resp 16   Ht 5' 3" (1.6 m)   Wt 70.3 kg (155 lb)  "  LMP  (LMP Unknown)   SpO2 99%   BMI 27.46 kg/m²     Constitutional: No distress.  Eyes: EOMI. No discharge. Anicteric.  HENT:   Neck: Normal range of motion. Neck supple.  Cardiovascular: Normal rate. No murmur, no gallop and no friction rub heard.   Pulmonary/Chest: No respiratory distress. Effort normal. No wheezes, no rales, no rhonchi.   Abdominal: Bowel sounds normal. Soft. No distension and no mass. There is no tenderness. There is no rebound, no guarding, no tenderness at McBurney's point.  Neurological: GCS 15. Alert and oriented to person, place, and time. No gross cranial nerve, light touch or strength deficit. Coordination normal.   Skin: Skin is warm and dry.   EXT: 2+ radial pulses.  Varicosities.  Mild symmetric lower extremity edema.  Psychiatric: Behavior is normal. Judgment normal.  Pleasant.        Medical Decision Making / Independent Interpretations / External Records Reviewed:      ED Course as of 02/07/24 1432   Wed Feb 07, 2024   1411 Patient is a 64-year-old female with hypertension, diabetes, lupus, history of thyroid disease, veins who presents for evaluation of elevated blood pressure noted at vascular surgery clinic visit today for evaluation of her varicosities.  She states that yesterday she had transient episode of chest pain for about 3 minutes.  The pain radiated to her left arm.  This is immediately after taking her evening medications.  When asked about other episodes of chest pain, she states that she has had very mild, transient episodes of chest pain chronically that she has not been concerned about.  Of note, patient had preop evaluation for anticipated right shoulder surgery for ruptured right supraspinatus tendon. Surgery is scheduled for March 26.  She has not had any interval pain since the episode last night.  Patient denies radiation to both arms, associated vomiting, worsening with exertion, pre/syncope, diaphoresis which would increase the pretest probability of  ACS/MI.  Patient arrived from flight from [facility] about 1 week ago. The flight was about 4.5 hours. Denies travel/immobility, recent procedures / admissions, cough, hemoptysis, LE edema or pain, dyspnea, or fevers.  Low pretest probability of acute pulmonary embolus.  The initial differential also included pneumonia, acute coronary syndrome, dysrhythmia, dehydration.    [RC]   1418 I independently reviewed and interpreted CXR which shows no pneumothorax, no focal consolidation, no cardiomegaly, no acute process.    Patient states that she feels well.  I independently reviewed and interpreted labs which are notable for unremarkable and unrevealing CBC, CMP, normal troponin.  Patient's blood pressure improved without any specific intervention. She is confident she can get close f/u with her PCP for antihypertensive management.   I note the patient has elevated blood pressures during this encounter. Patient does not have signs or symptoms suggestive of hypertensive emergency (denies chest pain, shortness breath, vision change, or urinary changes consistent with acute hypertensive kidney disease). Risk of acutely lowering blood pressure exceeds benefit. We will have the patient follow up with PCP for continued hypertension management.    --  I discussed with pt and/or guardian/caretaker that this evaluation in the ED does not suggest any emergent or life threatening medical condition requiring admission or further immediate intervention or diagnostics. Regardless, an unremarkable evaluation in the ED does not preclude the development or presence of a serious or life threatening condition. Pt was instructed to return for any worsening, new, changed, or concerning symptoms.     I had a detailed discussion with patient and/or guardian/caretaker regarding findings, plan, return precautions, importance of medication adherence, need to follow-up with a PCP and specialist. All questions answered.     Note was created  using voice recognition software. It may have occasional typographical errors not identified and edited despite initial review prior to signing.   [RC]      ED Course User Index  [RC] Elliot Butler MD       --  I decided to obtain the patient's medical records. I reviewed patient's prior external notes / results: specialist documentation   .   --  Additional Medical Decision Making: Prescription drug management    Medications - No data to display           No future appointments.     Diagnostic Impression:    1. Hypertension    2. Chest pain                  Elliot Butler MD  02/07/24 7281

## 2024-02-07 NOTE — PROGRESS NOTES
Yovany Mccauley MD, RPVI                                 [facility]                           [facility]                             02/07/2024    HPI:  Jaelyn Peter is a 64 y.o. female with There is no problem list on file for this patient.   being managed by PCP and specialists who is here today for evaluation of BLE varicose veins.  Patient states location is BLE occurring for yrs.  Associated signs and symptoms include pain.  Quality is aching and severity is 6/10.  Symptoms began yrs ago.  Alleviating factors include elevation.  Worsening factors include dependency.  Patient is wearing compression stockings daily.  +FH of venous disease.  Symptoms do limit patient's functional status and daily activities.  no DVT history.  no venous interventions.  no low sodium diet.  no excessive water intake.    Migraine with aura: no  PFO/ASD/right to left shunt: no  Pregnant: no  Breastfeeding: no    no MI  no Stroke  Tobacco use: no    8/2022:  C/o BLE spider veins.    4/2023:  s/p Right lower extremity sclerotherapy 1/2023 and RLE stab phlebectomy 6/2022.  Doing well. Happy with results.  C/o LLE painful varicose veins and spider veins.    10/2023:  happy with BLE legs.  + compression    2/2024:  c/o painful RLE varicose veins despite compression and elevation > 3 mo.    Past Medical History:   Diagnosis Date    Diabetes mellitus     Hypertension     Lupus     Thyroid disease      Past Surgical History:   Procedure Laterality Date    APPENDECTOMY      HERNIA REPAIR       Family History   Problem Relation Age of Onset    Diabetes Mother     Heart disease Mother     Cancer Father     Breast cancer Neg Hx      Social History     Socioeconomic History    Marital status:    Tobacco Use    Smoking status: Never    Smokeless tobacco: Never   Substance and Sexual Activity    Alcohol use: Not Currently    Drug use: Never    Sexual activity: Not Currently     Partners: Male  "      Current Outpatient Medications:     albuterol (PROVENTIL/VENTOLIN HFA) 90 mcg/actuation inhaler, Inhale 2 puffs into the lungs every 6 (six) hours as needed for Wheezing. Cough and Shortness of Breath. Rescue, Disp: 6.7 g, Rfl: 1    atorvastatin (LIPITOR) 10 MG tablet, , Disp: , Rfl:     BD ULTRA-FINE SHORT PEN NEEDLE 31 gauge x 5/16" Ndle, SMARTSIG:Pre-Filled Pen Syringe SUB-Q 4 Times Daily, Disp: , Rfl:     fluticasone propionate (FLONASE) 50 mcg/actuation nasal spray, 2 sprays by Each Nostril route daily as needed., Disp: , Rfl:     insulin aspart U-100 (NOVOLOG) 100 unit/mL (3 mL) InPn pen, Inject 5 Units into the skin 3 (three) times daily with meals., Disp: , Rfl:     irbesartan-hydrochlorothiazide (AVALIDE) 300-12.5 mg per tablet, Take 1 tablet by mouth once daily., Disp: , Rfl:     LANTUS SOLOSTAR U-100 INSULIN glargine 100 units/mL (3mL) SubQ pen, Inject 30 Units into the skin 2 (two) times a day., Disp: , Rfl:     levothyroxine (SYNTHROID) 100 MCG tablet, Take 100 mcg by mouth before breakfast., Disp: , Rfl:     MICRO THIN LANCETS 33 gauge Misc, , Disp: , Rfl:     MOUNJARO 2.5 mg/0.5 mL PnIj, Inject into the skin., Disp: , Rfl:     MOUNJARO 5 mg/0.5 mL PnIj, SMARTSI Milligram(s) SUB-Q Once a Week, Disp: , Rfl:     MOUNJARO 7.5 mg/0.5 mL PnIj, SMARTSI.5 Milligram(s) SUB-Q Once a Week, Disp: , Rfl:     ONETOUCH SERA Askew, , Disp: , Rfl:     pantoprazole (PROTONIX) 20 MG tablet, , Disp: , Rfl:     triamcinolone acetonide 0.1% (KENALOG) 0.1 % cream, SMARTSIG:Topical 1 to 2 Times Daily PRN, Disp: , Rfl:     REVIEW OF SYSTEMS:  General: No fevers or chills; ENT: No sore throat; Allergy and Immunology: no persistent infections; Hematological and Lymphatic: No history of bleeding or easy bruising; Endocrine: negative; Respiratory: no cough, shortness of breath, or wheezing; Cardiovascular: no chest pain or dyspnea on exertion; Gastrointestinal: no abdominal pain/back, change in bowel habits, or bloody " "stools; Genito-Urinary: no dysuria, trouble voiding, or hematuria; Musculoskeletal: edema; Neurological: no TIA or stroke symptoms; Psychiatric: no nervousness, anxiety or depression.    PHYSICAL EXAM:      Pulse: 72         General appearance:  Alert, well-appearing, and in no distress.  Oriented to person, place, and time                    Neurological: Normal speech, no focal findings noted; CN II - XII grossly intact. RLE with sensation to light touch, LLE with sensation to light touch.            Musculoskeletal: Digits/nail without cyanosis/clubbing.  Strength 5/5 BLE.                    Neck: Supple, no significant adenopathy                  Chest:  No wheezes, symmetric air entry. No use of accessory muscles               Cardiac: Normal rate and regular rhythm            Abdomen: Soft, nontender, nondistended      Extremities:   2+ R DP pulse, 2+ L DP pulse      1+ RLE edema, 1+ LLE edema    Skin:  RLE no ulcer; LLE no ulcer      RLE improved spider veins, LLE improved spider veins      RLE + varicose veins, LLE + varicose veins    CEAP 3/3        LAB RESULTS:  No results found for: "CBC"  No results found for: "LABPROT", "INR"  Lab Results   Component Value Date     (L) 02/09/2023    K 3.6 02/09/2023    CL 96 02/09/2023    CO2 24 02/09/2023     (H) 02/09/2023    BUN 17 02/09/2023    CREATININE 1.0 02/09/2023    CALCIUM 10.7 (H) 02/09/2023    ANIONGAP 14 02/09/2023    EGFRNONAA >60 04/13/2022     Lab Results   Component Value Date    WBC 9.46 02/09/2023    RBC 5.20 02/09/2023    HGB 16.1 (H) 02/09/2023    HCT 47.1 02/09/2023    MCV 91 02/09/2023    MCH 31.0 02/09/2023    MCHC 34.2 02/09/2023    RDW 13.2 02/09/2023     02/09/2023    MPV 10.5 02/09/2023    GRAN 5.4 02/09/2023    GRAN 57.5 02/09/2023    LYMPH 2.8 02/09/2023    LYMPH 30.0 02/09/2023    MONO 0.7 02/09/2023    MONO 7.8 02/09/2023    EOS 0.3 02/09/2023    BASO 0.10 02/09/2023    EOSINOPHIL 3.3 02/09/2023    BASOPHIL 1.1 " "02/09/2023    DIFFMETHOD Automated 02/09/2023     .No results found for: "HGBA1C"    IMAGING:  All pertinent imaging has been reviewed and interpreted independently.    Venous US 2022 Impression:  Color flow evaluation of the right lower extremity demonstrates no evidence of venous thrombosis in the deep or superficial veins. Significant reflux is not noted in the GSV.  Reflux does not include the saphenofemoral junction (SFJ).   Significant reflux is not noted in the SSV.  Reflux does not include the saphenopopliteal junction (SPJ).  There is no evidence of reflux in the Accessory vein.  Color flow evaluation of the left lower extremity demonstrates no evidence of venous thrombosis in the deep or superficial veins. Significant reflux is not noted in the GSV.  Reflux does not include the saphenofemoral junction (SFJ).   Significant reflux is not noted in the SSV.  Reflux does not include the saphenopopliteal junction (SPJ).  There is no evidence of reflux in the Accessory vein.    IMP/PLAN:  64 y.o. female with There is no problem list on file for this patient.   being managed by PCP and specialists who is here today for evaluation of symptomatic BLE varicose and spider veins.    -symptomatic BLE varicose veins despite compression and elevation > 3 mo s/p RLE stab phlebectomy 6/2022  -recommend compression with Rx stockings, elevation, dietary changes associated with water and sodium intake discussed at length with patient  -Exercise   -BLE spider veins s/p BLE sclerotherapy  -rec RLE stab phlebectomy of varicose veins  -Derm referral for dermal laser BLE    I spent 12 minutes evaluating this patient and greater than 50% of the time was spent counseling, coordinator care and discussing the plan of care.  All questions were answered and patient stated understanding with agreement with the above treatment plan.    Yovany Mccauley MD [facility]  Vascular and Endovascular Surgery            "

## 2024-02-07 NOTE — ED TRIAGE NOTES
"Pt was seeing "the vein doctor" today when they realized her B/P was elevated. Pt states She does not miss her meds and took it at 0900 this am. She also reports blurred vision and a headache since this am.  She was seen last Thursday by her PCP and her BP was normal. She has had no recent changes in her meds.  "

## 2024-02-08 DIAGNOSIS — I83.891 VARICOSE VEINS OF LEG WITH SWELLING, RIGHT: ICD-10-CM

## 2024-02-08 DIAGNOSIS — I83.811 VARICOSE VEINS OF LEG WITH PAIN, RIGHT: Primary | ICD-10-CM

## 2024-02-08 LAB
OHS QRS DURATION: 88 MS
OHS QTC CALCULATION: 415 MS

## 2024-02-14 ENCOUNTER — TELEPHONE (OUTPATIENT)
Dept: DERMATOLOGY | Facility: CLINIC | Age: 64
End: 2024-02-14
Payer: MEDICARE

## 2024-03-06 ENCOUNTER — TELEPHONE (OUTPATIENT)
Dept: DERMATOLOGY | Facility: CLINIC | Age: 64
End: 2024-03-06
Payer: MEDICARE

## 2024-03-06 NOTE — TELEPHONE ENCOUNTER
Pt not interest in laser consult due to insurance not paying for cosmetic tx----- Message from Mary CARR Route sent at 3/6/2024  1:07 PM CST -----  Regarding: Referral  Contact: pt.  [phone]  Pt is calling to schedule an appt with provide for laser on leg.  She has a referral in the system. Patient Requesting Call Back @  [phone]

## 2024-03-14 ENCOUNTER — TELEPHONE (OUTPATIENT)
Dept: GASTROENTEROLOGY | Facility: CLINIC | Age: 64
End: 2024-03-14
Payer: MEDICARE

## 2024-03-14 NOTE — TELEPHONE ENCOUNTER
Endoscopy Scheduling Questionnaire:         Are you taking any blood thinners? no               If Yes  Have you been on them for longer than one year? N/a    2. Have you been diagnosed with Diverticulitis in past three months?  no    3. Are you on dialysis or have Kidney Disease? no    4. Previous Colonoscopy?  yes         If yes Do you have a history of colon polyps?  no    5. Family History of Colon Cancer: yes         Relation: Father and Son       Age at Diagnosis: ? And 44      6. Are you a diabetic?  yes    7. Do you have a history of constipation?  no    8. Are you taking a GLP-1 Agonist/Adipex (weight loss drugs)? Yes Patient informed to discontinue taking   Dulaglutide (Trulicity) (weekly)  Exenatide extended release (Bydureon bcise) (weekly)  Exenatide (Byetta) (twice daily)  Semaglutide (Ozempic) (weekly)  Liraglutide (Victoza, Saxenda) (daily)  Lixisenatide (Adlyxin) (daily)  Semaglutide (Rybelsus) (taken by mouth once daily)    Hold GLP-1 agonists on the day of the procedure/surgery for patients who take the medication daily.    Hold GLP-1 agonists a week prior to the procedure/surgery for patients who take the medication weekly.    Procedure scheduled with Dr. Leigh Hazel   on  4/4/2024    The prep being used is Clenpiq     The patient's prep instructions were sent via mail.        CLENPIQ Instructions    You are scheduled for a colonoscopy with Dr. Leigh Hazel  on 4/4/2024 at [facility] located at [address] [place] [address].  Check in at the admit desk, first floor of the hospital (which is the building on the left).     You will receive a call 2-3 days before your colonoscopy to tell you the time to arrive.  If you have not received a call by the day before your procedure, call the Endoscopy Lab at [phone].    To ensure that your test is accurate and complete, you MUST follow these instructions listed below.  If you have any questions, please call our office at  [phone].  Plan on being at the hospital for your procedure for 3-4 hours. Please contact the office at [phone] two days prior to procedure date if reschedule is needed.    1.  Follow a CLEAR LIQUID DIET for the entire day before your scheduled colonoscopy.  This means no solid food the entire day starting when you wake.  You may have as much of the clear liquids as you want throughout the day.   CLEAR LIQUID DIET:   - Avoid Red, Orange, Purple, and/or Blue food coloring   - NO DAIRY   - You can have:  Coffee with sugar (no creamer), tea, water, soda, apple or white grape juice, chicken or beef broth/bouillon (no meat, noodles, or veggies), green/yellow popsicles, green/yellow Jell-O, lemonade.    2.  AT 5 pm the evening before your colonoscopy, OPEN ONE (1) BOTTLE OF CLENPIQ AND DRINK THE ENTIRE BOTTLE.  DRINK FIVE (5) 8-OUNCE GLASSES OF WATER (40 OUNCES TOTAL) OVER THE NEXT FIVE (5) HOURS.     3.  The endoscopy department will call you 2 days before your colonoscopy to tell you the exact time to arrive, AND to tell you the exact time to drink the 2nd portion of your prep (which will be FIVE HOURS BEFORE YOUR ARRIVAL TIME).  At this time given to you, OPEN THE OTHER ONE (1) BOTTLE OF CLENPIQ AND DRINK THE ENTIRE BOTTLE.  DRINK THREE (3) 8-OUNCE GLASSES OF WATER (24 OUNCES TOTAL) OVER THE NEXT THREE (3) HOURS. Once this is complete, you can not have anything else by mouth!    4.  You must have someone with you to DRIVE YOU HOME since you will be receiving IV sedation for the colonoscopy.    5.  It is ok to take your heart, blood pressure, and seizure medications in the morning of your test with a SIP of water.  Hold other medications until after your procedure.  Do NOT have anything else to eat or drink the morning of your colonoscopy.  It is ok to brush your teeth.    6.  If you are on blood thinners THAT YOU HAVE BEEN INSTRUCTED TO HOLD BY YOUR DOCTOR FOR THIS PROCEDURE, then do NOT take this the morning  of your colonoscopy.  Do NOT stop these medications on your own, they must be approved to be held by your doctor.  Your colonoscopy can NOT be done if you are on these medications.  Examples of blood thinners include: Coumadin, Aggrenox, Plavix, Pradaxa, Reapro, Pletal, Xarelto, Ticagrelor, Brilinta, Eliquis, and high dose aspirin (325 mg).  You do not have to stop baby aspirin 81 mg.    7.  IF YOU ARE DIABETIC:  NO INSULIN OR ORAL MEDICATIONS THE MORNING OF THE COLONOSCOPY.  TAKE ONLY HALF THE DOSE OF YOUR INSULIN THE DAY BEFORE THE COLONOSCOPY.  DO NOT TAKE ANY ORAL DIABETIC MEDICATIONS THE DAY BEFORE THE COLONOSCOPY.  IF YOU ARE AN INSULIN DEPENDENT DIABETIC WITH UNSTABLE BLOOD SUGARS, NOTIFY YOUR PRIMARY CARE PHYSICIAN FOR INSTRUCTIONS.    8. Please hold any GLP-1 medications prior to the procedure: Dulaglutide Trulicity(hold week prior), Exenatide Byetta (hold the morning of procedure), Semaglutide Ozempic (hold week prior), Liraglutide Victoza, Saxenda(hold week prior), Lixisenatide Adlyxin (hold the morning of procedure), Semaglutide Rybelsus (hold the morning of procedure), Tirzepatide Mounjaro (hold week prior)

## 2024-03-14 NOTE — LETTER
March 14, 2024    Jaelyn Prerna Peter  [address] [facility] [address]             [facility]astroenter[facility]  [address]  [address]  [place] [address]  Phone: [phone]  Fax: [phone] Dear Ms. Peter:    CLENPIQ Instructions    You are scheduled for a colonoscopy with Dr. Leigh Hazel  on 4/4/2024 at [facility] located at [address] [place] [address].  Check in at the admit desk, first floor of the hospital (which is the building on the left).     You will receive a call 2-3 days before your colonoscopy to tell you the time to arrive.  If you have not received a call by the day before your procedure, call the Endoscopy Lab at [phone].    To ensure that your test is accurate and complete, you MUST follow these instructions listed below.  If you have any questions, please call our office at [phone].  Plan on being at the hospital for your procedure for 3-4 hours. Please contact the office at [phone] two days prior to procedure date if reschedule is needed.    1.  Follow a CLEAR LIQUID DIET for the entire day before your scheduled colonoscopy.  This means no solid food the entire day starting when you wake.  You may have as much of the clear liquids as you want throughout the day.   CLEAR LIQUID DIET:   - Avoid Red, Orange, Purple, and/or Blue food coloring   - NO DAIRY   - You can have:  Coffee with sugar (no creamer), tea, water, soda, apple or white grape juice, chicken or beef broth/bouillon (no meat, noodles, or veggies), green/yellow popsicles, green/yellow Jell-O, lemonade.    2.  AT 5 pm the evening before your colonoscopy, OPEN ONE (1) BOTTLE OF CLENPIQ AND DRINK THE ENTIRE BOTTLE.  DRINK FIVE (5) 8-OUNCE GLASSES OF WATER (40 OUNCES TOTAL) OVER THE NEXT FIVE (5) HOURS.     3.  The endoscopy department will call you 2 days before your colonoscopy to tell you the exact time to arrive, AND to tell you the exact time to drink the  2nd portion of your prep (which will be FIVE HOURS BEFORE YOUR ARRIVAL TIME).  At this time given to you, OPEN THE OTHER ONE (1) BOTTLE OF CLENPIQ AND DRINK THE ENTIRE BOTTLE.  DRINK THREE (3) 8-OUNCE GLASSES OF WATER (24 OUNCES TOTAL) OVER THE NEXT THREE (3) HOURS. Once this is complete, you can not have anything else by mouth!    4.  You must have someone with you to DRIVE YOU HOME since you will be receiving IV sedation for the colonoscopy.    5.  It is ok to take your heart, blood pressure, and seizure medications in the morning of your test with a SIP of water.  Hold other medications until after your procedure.  Do NOT have anything else to eat or drink the morning of your colonoscopy.  It is ok to brush your teeth.    6.  If you are on blood thinners THAT YOU HAVE BEEN INSTRUCTED TO HOLD BY YOUR DOCTOR FOR THIS PROCEDURE, then do NOT take this the morning of your colonoscopy.  Do NOT stop these medications on your own, they must be approved to be held by your doctor.  Your colonoscopy can NOT be done if you are on these medications.  Examples of blood thinners include: Coumadin, Aggrenox, Plavix, Pradaxa, Reapro, Pletal, Xarelto, Ticagrelor, Brilinta, Eliquis, and high dose aspirin (325 mg).  You do not have to stop baby aspirin 81 mg.    7.  IF YOU ARE DIABETIC:  NO INSULIN OR ORAL MEDICATIONS THE MORNING OF THE COLONOSCOPY.  TAKE ONLY HALF THE DOSE OF YOUR INSULIN THE DAY BEFORE THE COLONOSCOPY.  DO NOT TAKE ANY ORAL DIABETIC MEDICATIONS THE DAY BEFORE THE COLONOSCOPY.  IF YOU ARE AN INSULIN DEPENDENT DIABETIC WITH UNSTABLE BLOOD SUGARS, NOTIFY YOUR PRIMARY CARE PHYSICIAN FOR INSTRUCTIONS.    8. Please hold any GLP-1 medications prior to the procedure: Dulaglutide Trulicity(hold week prior), Exenatide Byetta (hold the morning of procedure), Semaglutide Ozempic (hold week prior), Liraglutide Victoza, Saxenda(hold week prior), Lixisenatide Adlyxin (hold the morning of procedure), Semaglutide  Rybelsus (hold the morning of procedure), Tirzepatide Mounjaro (hold week prior)       If you have any questions or concerns, please don't hesitate to call.    Sincerely,        Lorie Blackmon MA

## 2024-03-18 RX ORDER — SODIUM PICOSULFATE, MAGNESIUM OXIDE, AND ANHYDROUS CITRIC ACID 12; 3.5; 1 G/175ML; G/175ML; MG/175ML
1 LIQUID ORAL ONCE
Qty: 175 ML | Refills: 0 | Status: SHIPPED | OUTPATIENT
Start: 2024-03-18 | End: 2024-03-18

## 2024-03-20 ENCOUNTER — HOSPITAL ENCOUNTER (EMERGENCY)
Facility: HOSPITAL | Age: 64
Discharge: HOME | End: 2024-03-20
Attending: EMERGENCY MEDICINE
Payer: MEDICARE

## 2024-03-20 VITALS
OXYGEN SATURATION: 99 % | TEMPERATURE: 98 F | DIASTOLIC BLOOD PRESSURE: 85 MMHG | HEART RATE: 75 BPM | SYSTOLIC BLOOD PRESSURE: 174 MMHG | RESPIRATION RATE: 20 BRPM

## 2024-03-20 DIAGNOSIS — H02.89 IRRITATION OF EYELID: ICD-10-CM

## 2024-03-20 DIAGNOSIS — R55 NEAR SYNCOPE: ICD-10-CM

## 2024-03-20 DIAGNOSIS — R42 DIZZINESS: Primary | ICD-10-CM

## 2024-03-20 LAB
ALBUMIN SERPL BCP-MCNC: 3.2 G/DL (ref 3.5–5.2)
ALP SERPL-CCNC: 73 U/L (ref 55–135)
ALT SERPL W/O P-5'-P-CCNC: 23 U/L (ref 10–44)
ANION GAP SERPL CALC-SCNC: 10 MMOL/L (ref 8–16)
AST SERPL-CCNC: 20 U/L (ref 10–40)
B-OH-BUTYR BLD STRIP-SCNC: 0.1 MMOL/L (ref 0–0.5)
BACTERIA #/AREA URNS HPF: NORMAL /HPF
BASOPHILS # BLD AUTO: 0.08 K/UL (ref 0–0.2)
BASOPHILS NFR BLD: 0.7 % (ref 0–1.9)
BILIRUB SERPL-MCNC: 0.5 MG/DL (ref 0.1–1)
BILIRUB UR QL STRIP: NEGATIVE
BNP SERPL-MCNC: 21 PG/ML (ref 0–99)
BUN SERPL-MCNC: 18 MG/DL (ref 8–23)
CALCIUM SERPL-MCNC: 9.5 MG/DL (ref 8.7–10.5)
CHLORIDE SERPL-SCNC: 104 MMOL/L (ref 95–110)
CK SERPL-CCNC: 46 U/L (ref 20–180)
CLARITY UR: CLEAR
CO2 SERPL-SCNC: 24 MMOL/L (ref 23–29)
COLOR UR: YELLOW
CREAT SERPL-MCNC: 0.7 MG/DL (ref 0.5–1.4)
DIFFERENTIAL METHOD BLD: ABNORMAL
EOSINOPHIL # BLD AUTO: 0.5 K/UL (ref 0–0.5)
EOSINOPHIL NFR BLD: 4.8 % (ref 0–8)
ERYTHROCYTE [DISTWIDTH] IN BLOOD BY AUTOMATED COUNT: 13.5 % (ref 11.5–14.5)
EST. GFR  (NO RACE VARIABLE): >60 ML/MIN/1.73 M^2
GLUCOSE SERPL-MCNC: 126 MG/DL (ref 70–110)
GLUCOSE UR QL STRIP: ABNORMAL
HCT VFR BLD AUTO: 36.4 % (ref 37–48.5)
HGB BLD-MCNC: 12.3 G/DL (ref 12–16)
HGB UR QL STRIP: NEGATIVE
IMM GRANULOCYTES # BLD AUTO: 0.04 K/UL (ref 0–0.04)
IMM GRANULOCYTES NFR BLD AUTO: 0.4 % (ref 0–0.5)
KETONES UR QL STRIP: NEGATIVE
LEUKOCYTE ESTERASE UR QL STRIP: NEGATIVE
LYMPHOCYTES # BLD AUTO: 2.5 K/UL (ref 1–4.8)
LYMPHOCYTES NFR BLD: 23.3 % (ref 18–48)
MCH RBC QN AUTO: 30.3 PG (ref 27–31)
MCHC RBC AUTO-ENTMCNC: 33.8 G/DL (ref 32–36)
MCV RBC AUTO: 90 FL (ref 82–98)
MICROSCOPIC COMMENT: NORMAL
MONOCYTES # BLD AUTO: 1.1 K/UL (ref 0.3–1)
MONOCYTES NFR BLD: 10.2 % (ref 4–15)
NEUTROPHILS # BLD AUTO: 6.5 K/UL (ref 1.8–7.7)
NEUTROPHILS NFR BLD: 60.6 % (ref 38–73)
NITRITE UR QL STRIP: NEGATIVE
NRBC BLD-RTO: 0 /100 WBC
PH UR STRIP: 6 [PH] (ref 5–8)
PLATELET # BLD AUTO: 221 K/UL (ref 150–450)
PMV BLD AUTO: 10.2 FL (ref 9.2–12.9)
POCT GLUCOSE: 144 MG/DL (ref 70–110)
POTASSIUM SERPL-SCNC: 4.3 MMOL/L (ref 3.5–5.1)
PROT SERPL-MCNC: 6.7 G/DL (ref 6–8.4)
PROT UR QL STRIP: NEGATIVE
RBC # BLD AUTO: 4.06 M/UL (ref 4–5.4)
SODIUM SERPL-SCNC: 138 MMOL/L (ref 136–145)
SP GR UR STRIP: 1.01 (ref 1–1.03)
TROPONIN I SERPL DL<=0.01 NG/ML-MCNC: <0.006 NG/ML (ref 0–0.03)
URN SPEC COLLECT METH UR: ABNORMAL
UROBILINOGEN UR STRIP-ACNC: NEGATIVE EU/DL
WBC # BLD AUTO: 10.7 K/UL (ref 3.9–12.7)
YEAST URNS QL MICRO: NORMAL

## 2024-03-20 PROCEDURE — 81000 URINALYSIS NONAUTO W/SCOPE: CPT | Performed by: EMERGENCY MEDICINE

## 2024-03-20 PROCEDURE — 99285 EMERGENCY DEPT VISIT HI MDM: CPT | Mod: 25

## 2024-03-20 PROCEDURE — 96361 HYDRATE IV INFUSION ADD-ON: CPT

## 2024-03-20 PROCEDURE — 82550 ASSAY OF CK (CPK): CPT | Performed by: EMERGENCY MEDICINE

## 2024-03-20 PROCEDURE — 93005 ELECTROCARDIOGRAM TRACING: CPT

## 2024-03-20 PROCEDURE — 84484 ASSAY OF TROPONIN QUANT: CPT | Performed by: EMERGENCY MEDICINE

## 2024-03-20 PROCEDURE — 93010 ELECTROCARDIOGRAM REPORT: CPT | Mod: ,,, | Performed by: INTERNAL MEDICINE

## 2024-03-20 PROCEDURE — 85025 COMPLETE CBC W/AUTO DIFF WBC: CPT | Performed by: EMERGENCY MEDICINE

## 2024-03-20 PROCEDURE — 96360 HYDRATION IV INFUSION INIT: CPT

## 2024-03-20 PROCEDURE — 25500020 PHARM REV CODE 255: Performed by: EMERGENCY MEDICINE

## 2024-03-20 PROCEDURE — 82962 GLUCOSE BLOOD TEST: CPT

## 2024-03-20 PROCEDURE — 25000003 PHARM REV CODE 250: Performed by: EMERGENCY MEDICINE

## 2024-03-20 PROCEDURE — 83880 ASSAY OF NATRIURETIC PEPTIDE: CPT | Performed by: EMERGENCY MEDICINE

## 2024-03-20 PROCEDURE — 80053 COMPREHEN METABOLIC PANEL: CPT | Performed by: EMERGENCY MEDICINE

## 2024-03-20 PROCEDURE — 82010 KETONE BODYS QUAN: CPT | Performed by: EMERGENCY MEDICINE

## 2024-03-20 RX ORDER — MECLIZINE HYDROCHLORIDE 25 MG/1
25 TABLET ORAL 3 TIMES DAILY PRN
Qty: 20 TABLET | Refills: 0 | Status: SHIPPED | OUTPATIENT
Start: 2024-03-20

## 2024-03-20 RX ORDER — SODIUM CHLORIDE 9 MG/ML
1000 INJECTION, SOLUTION INTRAVENOUS
Status: COMPLETED | OUTPATIENT
Start: 2024-03-20 | End: 2024-03-20

## 2024-03-20 RX ORDER — SULFAMETHOXAZOLE AND TRIMETHOPRIM 800; 160 MG/1; MG/1
1 TABLET ORAL 2 TIMES DAILY
Qty: 14 TABLET | Refills: 0 | Status: SHIPPED | OUTPATIENT
Start: 2024-03-20 | End: 2024-03-27

## 2024-03-20 RX ADMIN — SODIUM CHLORIDE 1000 ML: 9 INJECTION, SOLUTION INTRAVENOUS at 02:03

## 2024-03-20 RX ADMIN — IOHEXOL 100 ML: 350 INJECTION, SOLUTION INTRAVENOUS at 05:03

## 2024-03-20 NOTE — ED PROVIDER NOTES
Encounter Date: 3/20/2024       History     Chief Complaint   Patient presents with    Weakness     Pt had weakness this morning after taking her insulin. Pt drank coffee with sugar after her shower and had relief in symptoms. Pt arrived awake and alert with [facility] and accucheck of 121.      Patient is a 64-year-old female presents to the ED with complaint of weakness and syncope. Patient was in her normal state of health this morning took her prescribed insulin and went to go take a shower.  Following this, she reports she had a near syncopal episode.  She states that prior to nearly passing out she had a headache. She does not believe she fell or struck her head but she does complain of a posterior headache.  She denies any nausea vomiting vision changes.  She denies any new medications or recent illness.  She says called EMS to bring her to the ER for evaluation of generalized fatigue and reported near syncopal episode.        Review of patient's allergies indicates:   Allergen Reactions    Codeine Shortness Of Breath     Past Medical History:   Diagnosis Date    Diabetes mellitus     Hypertension     Lupus     Thyroid disease      Past Surgical History:   Procedure Laterality Date    APPENDECTOMY      HERNIA REPAIR       Family History   Problem Relation Age of Onset    Diabetes Mother     Heart disease Mother     Cancer Father     Breast cancer Neg Hx      Social History     Tobacco Use    Smoking status: Never    Smokeless tobacco: Never   Substance Use Topics    Alcohol use: Not Currently    Drug use: Never     Review of Systems   Constitutional:  Negative for chills, fatigue and fever.   Gastrointestinal:  Negative for abdominal pain, diarrhea, nausea and vomiting.   Musculoskeletal:  Positive for back pain.   Neurological:  Negative for tremors, syncope, weakness and numbness.        +near syncope   +lightheadedness        Physical Exam     Initial Vitals [03/20/24 1059]   BP Pulse Resp Temp SpO2   (!)  139/91 75 18 98 °F (36.7 °C) 97 %      MAP       --         Physical Exam    Nursing note and vitals reviewed.  Constitutional: She appears well-developed and well-nourished. No distress.   Well-appearing  Non toxic  No acute distress noted    HENT:   Head: Normocephalic and atraumatic.   Right Ear: External ear normal.   Left Ear: External ear normal.   Eyes: EOM are normal. Pupils are equal, round, and reactive to light.   Neck: Neck supple.   No midline tenderness  Normal ROM    Normal range of motion.  Cardiovascular:  Normal rate, regular rhythm, normal heart sounds and intact distal pulses.           Pulmonary/Chest: Breath sounds normal.   Lungs clear to auscultation bilaterally.    Abdominal: Abdomen is soft. Bowel sounds are normal.   Abdomen soft/NT/ND; normal bowel sounds    Musculoskeletal:         General: No tenderness or edema. Normal range of motion.      Cervical back: Normal range of motion and neck supple.     Neurological: She is alert and oriented to person, place, and time. She has normal strength. GCS score is 15. GCS eye subscore is 4. GCS verbal subscore is 5. GCS motor subscore is 6.   No focal neurological deficit.   Strength 5/5   Sensation grossly in tact.  MAEW  GCS 15  AAOx3    Skin: Skin is warm. Capillary refill takes less than 2 seconds.   Psychiatric: She has a normal mood and affect.         ED Course   Procedures  Labs Reviewed   CBC W/ AUTO DIFFERENTIAL - Abnormal; Notable for the following components:       Result Value    Hematocrit 36.4 (*)     Mono # 1.1 (*)     All other components within normal limits   COMPREHENSIVE METABOLIC PANEL - Abnormal; Notable for the following components:    Glucose 126 (*)     Albumin 3.2 (*)     All other components within normal limits   URINALYSIS, REFLEX TO URINE CULTURE - Abnormal; Notable for the following components:    Glucose, UA 4+ (*)     All other components within normal limits    Narrative:     Specimen Source->Urine   POCT GLUCOSE  - Abnormal; Notable for the following components:    POCT Glucose 144 (*)     All other components within normal limits   TROPONIN I   B-TYPE NATRIURETIC PEPTIDE   CK   URINALYSIS MICROSCOPIC    Narrative:     Specimen Source->Urine   BETA - HYDROXYBUTYRATE, SERUM        ECG Results              EKG 12-lead (Final result)        Collection Time Result Time QRS Duration OHS QTC Calculation    03/20/24 11:28:07 03/21/24 16:56:02 98 421                     Final result by Interface, Lab In [facility] (03/21/24 16:56:07)                   Narrative:    Test Reason : R55,    Vent. Rate : 070 BPM     Atrial Rate : 070 BPM     P-R Int : 162 ms          QRS Dur : 098 ms      QT Int : 390 ms       P-R-T Axes : 044 043 042 degrees     QTc Int : 421 ms    Normal sinus rhythm  Normal variant, early repolarization  When compared with ECG of 07-FEB-2024 12:27,  No significant change was found  Confirmed by Benjamín Mukherjee MD ([id number]) on 3/21/2024 4:56:00 PM    Referred By: [email or web address]   SELF           Confirmed By:Benjamín Mukherjee MD                                  Imaging Results              CTA Head and Neck (xpd) (Final result)  Result time 03/20/24 18:09:37      Final result by Marc Schultz DO (03/20/24 18:09:37)                   Impression:      1. No acute intracranial abnormality.  2. No acute large vessel occlusion or high-grade stenosis.  3. 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  4. Suspected mild edema or interstitial pneumonitis of the lung apices.      Electronically signed by: Marc Schultz  Date:    03/20/2024  Time:    18:09               Narrative:    EXAMINATION:  CTA HEAD AND NECK (XPD)    CLINICAL HISTORY:  Syncope, recurrent;    TECHNIQUE:  Non contrast low dose axial images were obtained though the head. CT angiogram was performed from the level of the samson to the top of the head following the IV administration of 100mL of Omnipaque 350.   Sagittal and coronal  reconstructions and maximum intensity projection reconstructions were performed. Arterial stenosis percentages are based on NASCET measurement criteria. An immediate post-contrast CT head was also performed. RapidAI LVO was utilized to measure arterial blood flow in the brain.    COMPARISON:  CTA head and neck from 02/02/2021.    FINDINGS:  CT head: The ventricles are normal in size without evidence of hydrocephalus. Continued patchy hypoattenuation in the supratentorial white matter, nonspecific but can be seen with chronic microvascular ischemic changes, stable from prior.  No parenchymal mass, edema or major vascular distribution infarct. There is no intracranial hemorrhage (agree with rapid AI assessment).  No extra-axial blood or fluid collection.    The cranium is intact.  There is mild mucosal thickening of the right maxillary sinus and minimal mucosal thickening of the left maxillary sinus.  The remaining paranasal sinuses and the mastoid air cells are clear.      CTA head:    Anterior circulation: The bilateral intracranial ICAs are patent and unremarkable. The bilateral anterior and middle cerebral arteries are within normal limits, without hemodynamically significant stenosis or occlusion.    Posterior circulation: The left intracranial vertebral artery is again not visualized, either chronically occluded or severely atrophic, stable.  The right intracranial vertebral artery is dominant and is unremarkable in appearance.  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  The basilar artery is attenuated, and there are persistent fetal origins of the bilateral PCAs.  The bilateral posterior cerebral arteries are within normal limits, without hemodynamically significant stenosis or occlusion.    There is no intracranial aneurysm.    CTA neck:    There is a 2 vessel aortic arch, a normal variant.    The common and internal carotid arteries are normal in course and caliber.  There is atherosclerosis of the bilateral  carotid bifurcations with less than 50% stenosis of the proximal ICAs bilaterally.    The vertebral origins are patent. The cervical vertebral arteries are normal in course and caliber.    The soft tissues of the neck are unremarkable.  There is interlobular septal thickening and bronchial wall thickening in the lung apices, suspicious for mild interstitial edema or pneumonitis.  Stable mildly prominent right hilar lymph node measuring approximately 9 mm (series 6, image 14).    There are degenerative changes of the cervical spine.  Remaining osseous structures are intact.                                       X-Ray Chest AP Portable (Final result)  Result time 03/20/24 12:19:02      Final result by Nuno Jonas III, MD (03/20/24 12:19:02)                   Impression:      No acute process seen.      Electronically signed by: Nuno Jonas MD  Date:    03/20/2024  Time:    12:19               Narrative:    EXAMINATION:  XR CHEST AP PORTABLE    CLINICAL HISTORY:  Syncope and collapse    FINDINGS:  Chest one view portable.    Heart size is normal.  Lungs are clear.  The bones show mild DJD.                                       CT Head Without Contrast (Final result)  Result time 03/20/24 11:44:27      Final result by Levy Chavez MD (03/20/24 11:44:27)                   Impression:      No definite acute intracranial findings by noncontrast CT.      Electronically signed by: Levy Chavez  Date:    03/20/2024  Time:    11:44               Narrative:    EXAMINATION:  CT HEAD WITHOUT CONTRAST    CLINICAL HISTORY:  Headache, sudden, severe;    TECHNIQUE:  Low dose axial images were obtained through the head.  Coronal and sagittal reformations were also performed. Contrast was not administered.    COMPARISON:  CT head 02/02/2021.    FINDINGS:  Blood: No acute intracranial hemorrhage.    Parenchyma: No definite loss of gray-white differentiation to suggest acute or subacute transcortical infarct.  Generalized pattern of age-related parenchymal volume loss.  Patchy areas of white matter hypoattenuation may reflect sequela of chronic small vessel ischemic disease.  Chronic appearing lacunar type infarcts in the deep gray nuclei and possibly left cynthia.    Ventricles/Extra-axial spaces: No abnormal extra-axial fluid collection. Basal cisterns are patent.    Vessels: Mild atherosclerotic calcifications.    Orbits: Unremarkable.    Scalp: Unremarkable.    Skull: There are no depressed skull fractures or destructive bone lesions.    Sinuses and mastoids: Scattered paranasal sinus mucosal thickening with retention cysts.    Other findings: None                                       Medications   0.9%  NaCl infusion (0 mLs Intravenous Stopped 3/20/24 1624)   iohexoL (OMNIPAQUE 350) injection 100 mL (100 mLs Intravenous Given 3/20/24 1730)     Medical Decision Making  Amount and/or Complexity of Data Reviewed  Labs: ordered. Decision-making details documented in ED Course.  Radiology: ordered. Decision-making details documented in ED Course.    Risk  Prescription drug management.               ED Course as of 03/21/24 1941   Wed Mar 20, 2024   1233 Sodium: 138 [LC]   1233 POCT Glucose(!): 144 [LC]   1233 Troponin I: <0.006 [LC]   1233 WBC: 10.70 [LC]   1233 Hemoglobin: 12.3 [LC]   1233 Hematocrit(!): 36.4 [LC]   1233 CT Head Without Contrast [LC]   1233 CT Head Without Contrast  No acute intracranial abnormality per final radiology read.  [LC]   1234 X-Ray Chest AP Portable  No acute cardiopulmonary process per my independent interpretation.  [LC]   1402 Sodium: 138 [LC]   1402 Potassium: 4.3 [LC]   1402 CPK: 46 [LC]   1402 Troponin I: <0.006 [LC]   1402 WBC: 10.70 [LC]   1402 RBC: 4.06 [LC]      ED Course User Index  [LC] Pablo Devries MD               Medical Decision Making:   Initial Assessment:   See HPI   Clinical Tests:   Lab Tests: Ordered and Reviewed  Radiological Study: Ordered and Reviewed  ED  Management:  - ED workup largely unremarkable  - pt able to ambulate in ED without difficulty   - no untoward event while in the emergency department   - pt stable for discharge  - - No further intervention is indicated at this time after having taken into account the patient's history, physical exam findings, and empirical and objective data obtained during the patient's emergency department workup.   - The patient is at low risk for an emergent medical condition at this time, and I am of the belief that that it is safe to discharge the patient from the emergency department.   - The patient is instructed to follow up as outpatient as indicated on the discharge paperwork.    - I have discussed the specifics of the workup with the patient and the patient has verbalized understanding of the details of the workup, the diagnosis, the treatment plan, and the need for outpatient follow-up.    - Although the patient has no emergent etiology today this does not preclude the development of an emergent condition so, in addition, I have advised the patient that they can return to the ED and/or activate EMS at any time with worsening of their symptoms, change of their symptoms, or with any other medical complaint.    - The patient remained comfortable and stable during their visit in the ED.    - Discharge and follow-up instructions discussed with the patient who expressed understanding and willingness to comply with my recommendations.  - Results of all emergency department tests  discussed thoroughly with patient; all patient questions answered; pt in agreement with plan  - Pt instructed to follow up with PCP in 2-3 days for recheck of today's complaints  - Pt given strict emergency department return precautions for any new or worsening of symptoms  - Pt discharged from the emergency department in stable condition, in no acute distress                Clinical Impression:  Final diagnoses:  [R42] Dizziness (Primary)  [H02.89]  Irritation of eyelid  [R55] Near syncope          ED Disposition Condition    Discharge Stable          ED Prescriptions       Medication Sig Dispense Start Date End Date Auth. Provider    meclizine (ANTIVERT) 25 mg tablet Take 1 tablet (25 mg total) by mouth 3 (three) times daily as needed for Dizziness. 20 tablet 3/20/2024 -- Pablo Devries MD    sulfamethoxazole-trimethoprim 800-160mg (BACTRIM DS) 800-160 mg Tab Take 1 tablet by mouth 2 (two) times daily. for 7 days 14 tablet 3/20/2024 3/27/2024 Pablo Devries MD          Follow-up Information       Follow up With Specialties Details Why Contact Info    Bibiana Rainey MD ([facility]) Family Medicine Schedule an appointment as soon as possible for a visit   [address] [facility] [address]  [phone]               Pablo Devries MD  03/21/24 1982

## 2024-03-21 LAB
OHS QRS DURATION: 98 MS
OHS QTC CALCULATION: 421 MS

## 2024-03-25 ENCOUNTER — PATIENT MESSAGE (OUTPATIENT)
Dept: VASCULAR SURGERY | Facility: CLINIC | Age: 64
End: 2024-03-25
Payer: MEDICARE

## 2024-04-02 ENCOUNTER — TELEPHONE (OUTPATIENT)
Dept: ENDOSCOPY | Facility: HOSPITAL | Age: 64
End: 2024-04-02
Payer: MEDICARE

## 2024-04-03 NOTE — TELEPHONE ENCOUNTER
Spoke with patient about arrival time @. 1300  Covid test =     Prep instructions reviewed: the day before the procedure, follow a clear liquid diet all day, then start the first 1/2 of prep at 5pm and take 2nd 1/2 of prep @.800  Pt must be completely NPO when prep completed @.   1000           Medications: Do not take Insulin or oral diabetic medications the day of the procedure.  Take as prescribed: heart, seizure and blood pressure medication in the morning with a sip of water (less than an ounce).  Take any breathing medications and bring inhalers to hospital with you Leave all valuables and jewelry at home.     Wear comfortable clothes to procedure to change into hospital gown You cannot drive for 24 hours after your procedure because you will receive sedation for your procedure to make you comfortable.  A ride must be provided at discharge.       Mounjaro last dose 3/26/24

## 2024-04-04 ENCOUNTER — HOSPITAL ENCOUNTER (OUTPATIENT)
Facility: HOSPITAL | Age: 64
Discharge: HOME | End: 2024-04-04
Attending: STUDENT IN AN ORGANIZED HEALTH CARE EDUCATION/TRAINING PROGRAM | Admitting: INTERNAL MEDICINE
Payer: MEDICARE

## 2024-04-04 ENCOUNTER — ANESTHESIA EVENT (OUTPATIENT)
Dept: ENDOSCOPY | Facility: HOSPITAL | Age: 64
End: 2024-04-04
Payer: MEDICARE

## 2024-04-04 ENCOUNTER — ANESTHESIA (OUTPATIENT)
Dept: ENDOSCOPY | Facility: HOSPITAL | Age: 64
End: 2024-04-04
Payer: MEDICARE

## 2024-04-04 VITALS
HEIGHT: 64 IN | BODY MASS INDEX: 25.27 KG/M2 | SYSTOLIC BLOOD PRESSURE: 137 MMHG | OXYGEN SATURATION: 100 % | DIASTOLIC BLOOD PRESSURE: 70 MMHG | TEMPERATURE: 98 F | RESPIRATION RATE: 13 BRPM | HEART RATE: 70 BPM | WEIGHT: 148 LBS

## 2024-04-04 DIAGNOSIS — Z12.11 ENCOUNTER FOR SCREENING FOR MALIGNANT NEOPLASM OF COLON: Primary | ICD-10-CM

## 2024-04-04 LAB — POCT GLUCOSE: 136 MG/DL (ref 70–110)

## 2024-04-04 PROCEDURE — G0105 COLORECTAL SCRN; HI RISK IND: HCPCS | Performed by: STUDENT IN AN ORGANIZED HEALTH CARE EDUCATION/TRAINING PROGRAM

## 2024-04-04 PROCEDURE — 37000008 HC ANESTHESIA 1ST 15 MINUTES: Performed by: STUDENT IN AN ORGANIZED HEALTH CARE EDUCATION/TRAINING PROGRAM

## 2024-04-04 PROCEDURE — 37000009 HC ANESTHESIA EA ADD 15 MINS: Performed by: STUDENT IN AN ORGANIZED HEALTH CARE EDUCATION/TRAINING PROGRAM

## 2024-04-04 PROCEDURE — D9220A PRA ANESTHESIA: Mod: ANES,,, | Performed by: ANESTHESIOLOGY

## 2024-04-04 PROCEDURE — 25000003 PHARM REV CODE 250: Performed by: INTERNAL MEDICINE

## 2024-04-04 PROCEDURE — 25000003 PHARM REV CODE 250: Performed by: NURSE ANESTHETIST, CERTIFIED REGISTERED

## 2024-04-04 PROCEDURE — 63600175 PHARM REV CODE 636 W HCPCS: Performed by: NURSE ANESTHETIST, CERTIFIED REGISTERED

## 2024-04-04 PROCEDURE — D9220A PRA ANESTHESIA: Mod: CRNA,,, | Performed by: NURSE ANESTHETIST, CERTIFIED REGISTERED

## 2024-04-04 PROCEDURE — 82962 GLUCOSE BLOOD TEST: CPT | Performed by: STUDENT IN AN ORGANIZED HEALTH CARE EDUCATION/TRAINING PROGRAM

## 2024-04-04 RX ORDER — PROPOFOL 10 MG/ML
VIAL (ML) INTRAVENOUS
Status: DISCONTINUED | OUTPATIENT
Start: 2024-04-04 | End: 2024-04-04

## 2024-04-04 RX ORDER — METOCLOPRAMIDE HYDROCHLORIDE 5 MG/ML
10 INJECTION INTRAMUSCULAR; INTRAVENOUS ONCE
Status: DISPENSED | OUTPATIENT
Start: 2024-04-04

## 2024-04-04 RX ORDER — SODIUM CHLORIDE 0.9 % (FLUSH) 0.9 %
10 SYRINGE (ML) INJECTION
Status: DISCONTINUED | OUTPATIENT
Start: 2024-04-04 | End: 2024-04-04 | Stop reason: HOSPADM

## 2024-04-04 RX ORDER — PROPOFOL 10 MG/ML
VIAL (ML) INTRAVENOUS CONTINUOUS PRN
Status: DISCONTINUED | OUTPATIENT
Start: 2024-04-04 | End: 2024-04-04

## 2024-04-04 RX ORDER — LIDOCAINE HYDROCHLORIDE 20 MG/ML
INJECTION INTRAVENOUS
Status: DISCONTINUED | OUTPATIENT
Start: 2024-04-04 | End: 2024-04-04

## 2024-04-04 RX ORDER — SODIUM CHLORIDE 9 MG/ML
INJECTION, SOLUTION INTRAVENOUS CONTINUOUS
Status: CANCELLED | OUTPATIENT
Start: 2024-04-04

## 2024-04-04 RX ORDER — SODIUM CHLORIDE 9 MG/ML
INJECTION, SOLUTION INTRAVENOUS CONTINUOUS
Status: DISCONTINUED | OUTPATIENT
Start: 2024-04-04 | End: 2024-04-04 | Stop reason: HOSPADM

## 2024-04-04 RX ADMIN — SODIUM CHLORIDE: 9 INJECTION, SOLUTION INTRAVENOUS at 01:04

## 2024-04-04 RX ADMIN — PROPOFOL 150 MCG/KG/MIN: 10 INJECTION, EMULSION INTRAVENOUS at 02:04

## 2024-04-04 RX ADMIN — PROPOFOL 75 MG: 10 INJECTION, EMULSION INTRAVENOUS at 02:04

## 2024-04-04 RX ADMIN — LIDOCAINE HYDROCHLORIDE 100 MG: 20 INJECTION, SOLUTION INTRAVENOUS at 02:04

## 2024-04-04 NOTE — H&P
"[facility]    CC: CRC screening    HPI 64 y.o. female here for FH of CRC and surveillance colonoscopy    Physical Examination  Ht 5' 3" (1.6 m)   Wt 72.6 kg (160 lb)   LMP  (LMP Unknown)   Breastfeeding No   BMI 28.34 kg/m²   General appearance: alert, cooperative, no distress  Abdomen: soft, non-tender; non-distended, dullness to percussion, bowel sounds normoactive; no organomegaly      Assessment:   #Surveillance colonoscopy  #FH of CRC  #No AC  #Taking injectable weight loss meds - informed to hold    Plan:  -surveillance colonoscopy    Attestation to follow which may differ from above plan. Please appreciate.    Bj Guzmán MD  [facility]  "

## 2024-04-04 NOTE — PROVATION PATIENT INSTRUCTIONS
Discharge Summary/Instructions after an Endoscopic Procedure  Patient Name: Jaelyn Peter  Patient MRN: [id number]  Patient YOB: 1960 Thursday, April 4, 2024  Leigh Hazel MD  Dear patient,  As a result of recent federal legislation (The Federal Cures Act), you may   receive lab or pathology results from your procedure in your [facility]   account before your physician is able to contact you. Your physician or   their representative will relay the results to you with their   recommendations at their soonest availability.  Thank you,  Your health is very important to us during the Covid Crisis. Following your   procedure today, you will receive a daily text for 2 weeks asking about   signs or symptoms of Covid 19.  Please respond to this text when you   receive it so we can follow up and keep you as safe as possible.   RESTRICTIONS:  During your procedure today, you received medications for sedation.  These   medications may affect your judgment, balance and coordination.  Therefore,   for 24 hours, you have the following restrictions:   - DO NOT drive a car, operate machinery, make legal/financial decisions,   sign important papers or drink alcohol.    ACTIVITY:  Today: no heavy lifting, straining or running due to procedural   sedation/anesthesia.  The following day: return to full activity including work.  DIET:  Eat and drink normally unless instructed otherwise.     TREATMENT FOR COMMON SIDE EFFECTS:  - Mild abdominal pain, nausea, belching, bloating or excessive gas:  rest,   eat lightly and use a heating pad.  - Sore Throat: treat with throat lozenges and/or gargle with warm salt   water.  - Because air was used during the procedure, expelling large amounts of air   from your rectum or belching is normal.  - If a bowel prep was taken, you may not have a bowel movement for 1-3 days.    This is normal.  SYMPTOMS TO WATCH FOR AND REPORT TO YOUR PHYSICIAN:  1. Abdominal pain or bloating, other  than gas cramps.  2. Chest pain.  3. Back pain.  4. Signs of infection such as: chills or fever occurring within 24 hours   after the procedure.  5. Rectal bleeding, which would show as bright red, maroon, or black stools.   (A tablespoon of blood from the rectum is not serious, especially if   hemorrhoids are present.)  6. Vomiting.  7. Weakness or dizziness.  GO DIRECTLY TO THE NEAREST EMERGENCY ROOM IF YOU HAVE ANY OF THE FOLLOWING:      Difficulty breathing              Chills and/or fever over 101 F   Persistent vomiting and/or vomiting blood   Severe abdominal pain   Severe chest pain   Black, tarry stools   Bleeding- more than one tablespoon   Any other symptom or condition that you feel may need urgent attention  Your doctor recommends these additional instructions:  If any biopsies were taken, your doctors clinic will contact you in 1 to 2   weeks with any results.  - Discharge patient to home.   - Resume previous diet.   - Continue present medications.   - Repeat colonoscopy in 5 years for surveillance.   - Patient has a contact number available for emergencies.  The signs and   symptoms of potential delayed complications were discussed with the   patient.  Return to normal activities tomorrow.  Written discharge   instructions were provided to the patient.  For questions, problems or results please call your physician - Leigh Hazel MD.  EMERGENCY PHONE NUMBER: [phone],  LAB RESULTS: [phone]  IF A COMPLICATION OR EMERGENCY SITUATION ARISES AND YOU ARE UNABLE TO REACH   YOUR PHYSICIAN - GO DIRECTLY TO THE EMERGENCY ROOM.  MD Leigh Callaway MD  4/4/2024 3:08:28 PM  This report has been verified and signed electronically.  Dear patient,  As a result of recent federal legislation (The Federal Cures Act), you may   receive lab or pathology results from your procedure in your [facility]   account before your physician is able to contact you. Your physician or    their representative will relay the results to you with their   recommendations at their soonest availability.  Thank you,  PROVATION

## 2024-04-04 NOTE — TRANSFER OF CARE
"Anesthesia Transfer of Care Note    Patient: Jaelyn Peter    Procedure(s) Performed: Procedure(s) (LRB):  COLONOSCOPY (N/A)    Patient location: GI    Anesthesia Type: general    Transport from OR: Transported from OR on room air with adequate spontaneous ventilation    Post pain: adequate analgesia    Post assessment: no apparent anesthetic complications and tolerated procedure well    Post vital signs: stable    Level of consciousness: awake and alert    Nausea/Vomiting: no nausea/vomiting    Complications: none    Transfer of care protocol was followed      Last vitals: Visit Vitals  BP (!) 168/73 (BP Location: Left arm, Patient Position: Lying)   Pulse 69   Temp 36.8 °C (98.2 °F) (Temporal)   Resp 18   Ht 5' 4" (1.626 m)   Wt 67.1 kg (148 lb)   LMP  (LMP Unknown)   SpO2 100%   Breastfeeding No   BMI 25.40 kg/m²     "

## 2024-04-05 NOTE — ANESTHESIA POSTPROCEDURE EVALUATION
Anesthesia Post Evaluation    Patient: Jaelyn Peter    Procedure(s) Performed: Procedure(s) (LRB):  COLONOSCOPY (N/A)    Final Anesthesia Type: general      Patient location during evaluation: PACU  Patient participation: Yes- Able to Participate  Level of consciousness: awake and alert  Post-procedure vital signs: reviewed and stable  Pain management: adequate  Airway patency: patent    PONV status at discharge: No PONV  Anesthetic complications: no      Cardiovascular status: blood pressure returned to baseline  Respiratory status: unassisted  Hydration status: euvolemic                Vitals Value Taken Time   /70 04/04/24 1500   Temp 36.5 °C (97.7 °F) 04/04/24 1430   Pulse 70 04/04/24 1500   Resp 13 04/04/24 1500   SpO2 100 % 04/04/24 1500         Event Time   Out of Recovery 15:08:53         Pain/Jacque Score: No data recorded

## 2024-04-18 DIAGNOSIS — G89.18 PAIN ASSOCIATED WITH SURGICAL PROCEDURE: Primary | ICD-10-CM

## 2024-04-18 DIAGNOSIS — F41.9 ANXIETY DUE TO INVASIVE PROCEDURE: ICD-10-CM

## 2024-04-18 RX ORDER — ALPRAZOLAM 0.5 MG/1
TABLET ORAL
Qty: 2 TABLET | Refills: 0 | Status: SHIPPED | OUTPATIENT
Start: 2024-04-18

## 2024-04-18 RX ORDER — MELOXICAM 7.5 MG/1
7.5 TABLET ORAL 2 TIMES DAILY
Qty: 14 TABLET | Refills: 0 | Status: SHIPPED | OUTPATIENT
Start: 2024-04-18 | End: 2024-04-25

## 2024-04-22 ENCOUNTER — PROCEDURE VISIT (OUTPATIENT)
Dept: VASCULAR SURGERY | Facility: CLINIC | Age: 64
End: 2024-04-22
Payer: MEDICARE

## 2024-04-22 VITALS
WEIGHT: 147.94 LBS | DIASTOLIC BLOOD PRESSURE: 82 MMHG | SYSTOLIC BLOOD PRESSURE: 164 MMHG | BODY MASS INDEX: 25.39 KG/M2 | HEART RATE: 80 BPM

## 2024-04-22 DIAGNOSIS — I83.891 VARICOSE VEINS OF LEG WITH SWELLING, RIGHT: ICD-10-CM

## 2024-04-22 DIAGNOSIS — G89.18 PAIN ASSOCIATED WITH SURGICAL PROCEDURE: Primary | ICD-10-CM

## 2024-04-22 DIAGNOSIS — I83.811 VARICOSE VEINS OF LEG WITH PAIN, RIGHT: ICD-10-CM

## 2024-04-22 PROCEDURE — 37766 PHLEB VEINS - EXTREM 20+: CPT | Mod: RT,S$GLB,, | Performed by: SURGERY

## 2024-04-22 RX ORDER — LIDOCAINE HYDROCHLORIDE 10 MG/ML
1 INJECTION INFILTRATION; PERINEURAL
Status: SHIPPED | OUTPATIENT
Start: 2024-04-22

## 2024-04-22 NOTE — PROCEDURES
Date:  4/22/24    Pre-Procedure Diagnosis: Varicose veins of the right lower extremity with pain and veins of the right lower extremity with other complications.    Post-Procedure Diagnosis: Same    Procedure: Right lower extremity stab phlebectomy greater than 20 stabs    Surgeon: Yovany Mccauley MD RPVI    Assistant: Norma Toscano MS    Anesthesia: Local 1% Lidocaine with epinephrine    She was brought to the procedure room and placed supine on the procedure table.  A time out was performed.  The skin of the leg was prepped and draped in sterile fashion.  Symptomatic varicosities were marked preop.  I then proceeded to perform the stab phlebectomy of the right lower extremity varicose veins to the anterior and posterior aspect of the limb.  The skin was anesthetized with tumescent, then a small skin inc was performed with a scalpel.  The spatula was then used to separate the subdermal tissue overlying the varicose vein.  The hook was then used to grasp the vein and the vein was removed with forceps and hemostats.  This was performed to 21 total areas.  Hemastasis was achieved with manual pressure and sterile dressings were applied.  Compression was then placed.    The patient then ambulated to the exam room without difficulty in stable condition.      Complications:  None

## 2024-07-26 ENCOUNTER — OFFICE VISIT (OUTPATIENT)
Dept: VASCULAR SURGERY | Facility: CLINIC | Age: 64
End: 2024-07-26
Payer: MEDICARE

## 2024-07-26 VITALS
WEIGHT: 147.94 LBS | HEIGHT: 64 IN | DIASTOLIC BLOOD PRESSURE: 53 MMHG | SYSTOLIC BLOOD PRESSURE: 110 MMHG | HEART RATE: 101 BPM | BODY MASS INDEX: 25.25 KG/M2

## 2024-07-26 DIAGNOSIS — I83.93 SPIDER VEINS OF BOTH LOWER EXTREMITIES: ICD-10-CM

## 2024-07-26 DIAGNOSIS — I83.812 VARICOSE VEINS OF LEG WITH PAIN, LEFT: Primary | ICD-10-CM

## 2024-07-26 DIAGNOSIS — I83.892 VARICOSE VEINS OF LEG WITH SWELLING, LEFT: ICD-10-CM

## 2024-07-26 DIAGNOSIS — I83.12 VARICOSE VEINS OF BOTH LOWER EXTREMITIES WITH INFLAMMATION: ICD-10-CM

## 2024-07-26 DIAGNOSIS — I83.892 SYMPTOMATIC VARICOSE VEINS, LEFT: Primary | ICD-10-CM

## 2024-07-26 DIAGNOSIS — I83.11 VARICOSE VEINS OF BOTH LOWER EXTREMITIES WITH INFLAMMATION: ICD-10-CM

## 2024-07-26 NOTE — PROGRESS NOTES
Yovany Mccauley MD, RPVI                                 [facility]                           [facility]                             07/26/2024    HPI:  Jaelyn Peter is a 64 y.o. female with   Patient Active Problem List   Diagnosis    Varicose veins of legs    being managed by PCP and specialists who is here today for evaluation of BLE varicose veins.  Patient states location is BLE occurring for yrs.  Associated signs and symptoms include pain.  Quality is aching and severity is 6/10.  Symptoms began yrs ago.  Alleviating factors include elevation.  Worsening factors include dependency.  Patient is wearing compression stockings daily.  +FH of venous disease.  Symptoms do limit patient's functional status and daily activities.  no DVT history.  no venous interventions.  no low sodium diet.  no excessive water intake.    Migraine with aura: no  PFO/ASD/right to left shunt: no  Pregnant: no  Breastfeeding: no    no MI  no Stroke  Tobacco use: no    8/2022:  C/o BLE spider veins.    4/2023:  s/p Right lower extremity sclerotherapy 1/2023 and RLE stab phlebectomy 6/2022.  Doing well. Happy with results.  C/o LLE painful varicose veins and spider veins.    10/2023:  happy with BLE legs.  + compression    2/2024:  c/o painful RLE varicose veins despite compression and elevation > 3 mo.    7/2024:  s/p RLE stab phlebectomy 4/2024.  c/o LLE painful varicose veins despite compression and elevation > 3 mo    Past Medical History:   Diagnosis Date    Diabetes mellitus     Hypertension     Lupus     Thyroid disease      Past Surgical History:   Procedure Laterality Date    APPENDECTOMY      COLONOSCOPY N/A 4/4/2024    Procedure: COLONOSCOPY;  Surgeon: Leigh Hazel MD;  Location: [facility];  Service: Endoscopy;  Laterality: N/A;    HERNIA REPAIR       Family History   Problem Relation Name Age of Onset    Diabetes Mother      Heart disease Mother      Cancer Father      Breast  "cancer Neg Hx       Social History     Socioeconomic History    Marital status:    Tobacco Use    Smoking status: Never    Smokeless tobacco: Never   Substance and Sexual Activity    Alcohol use: Not Currently    Drug use: Never    Sexual activity: Not Currently     Partners: Male       Current Outpatient Medications:     albuterol (PROVENTIL/VENTOLIN HFA) 90 mcg/actuation inhaler, Inhale 2 puffs into the lungs every 6 (six) hours as needed for Wheezing. Cough and Shortness of Breath. Rescue, Disp: 6.7 g, Rfl: 1    atorvastatin (LIPITOR) 10 MG tablet, , Disp: , Rfl:     BD ULTRA-FINE SHORT PEN NEEDLE 31 gauge x 5/16" Ndle, SMARTSIG:Pre-Filled Pen Syringe SUB-Q 4 Times Daily, Disp: , Rfl:     fluticasone propionate (FLONASE) 50 mcg/actuation nasal spray, 2 sprays by Each Nostril route daily as needed., Disp: , Rfl:     insulin aspart U-100 (NOVOLOG) 100 unit/mL (3 mL) InPn pen, Inject 5 Units into the skin 3 (three) times daily with meals., Disp: , Rfl:     irbesartan-hydrochlorothiazide (AVALIDE) 300-12.5 mg per tablet, Take 1 tablet by mouth once daily., Disp: , Rfl:     LANTUS SOLOSTAR U-100 INSULIN glargine 100 units/mL (3mL) SubQ pen, Inject 30 Units into the skin 2 (two) times a day., Disp: , Rfl:     levothyroxine (SYNTHROID) 100 MCG tablet, Take 100 mcg by mouth before breakfast., Disp: , Rfl:     meclizine (ANTIVERT) 25 mg tablet, Take 1 tablet (25 mg total) by mouth 3 (three) times daily as needed for Dizziness., Disp: 20 tablet, Rfl: 0    MICRO THIN LANCETS 33 gauge Misc, , Disp: , Rfl:     MOUNJARO 7.5 mg/0.5 mL PnIj, SMARTSI.5 Milligram(s) SUB-Q Once a Week, Disp: , Rfl:     ONETOUCH VERIO Strp, , Disp: , Rfl:     pantoprazole (PROTONIX) 20 MG tablet, , Disp: , Rfl:     triamcinolone acetonide 0.1% (KENALOG) 0.1 % cream, SMARTSIG:Topical 1 to 2 Times Daily PRN, Disp: , Rfl:     ALPRAZolam (XANAX) 0.5 MG tablet, Take one tablet by mouth 1 hour before procedure and bring other tablet with you to " "the procedure. (Patient not taking: Reported on 7/26/2024), Disp: 2 tablet, Rfl: 0    Current Facility-Administered Medications:     LIDOcaine HCL 10 mg/ml (1%) injection 1 mL, 1 mL, Other, 1 time in Clinic/HOD,     Facility-Administered Medications Ordered in Other Visits:     metoclopramide injection 10 mg, 10 mg, Intravenous, Once, Sarah Borja MD    REVIEW OF SYSTEMS:  General: No fevers or chills; ENT: No sore throat; Allergy and Immunology: no persistent infections; Hematological and Lymphatic: No history of bleeding or easy bruising; Endocrine: negative; Respiratory: no cough, shortness of breath, or wheezing; Cardiovascular: no chest pain or dyspnea on exertion; Gastrointestinal: no abdominal pain/back, change in bowel habits, or bloody stools; Genito-Urinary: no dysuria, trouble voiding, or hematuria; Musculoskeletal: edema; Neurological: no TIA or stroke symptoms; Psychiatric: no nervousness, anxiety or depression.    PHYSICAL EXAM:      Pulse: 101         General appearance:  Alert, well-appearing, and in no distress.  Oriented to person, place, and time                    Neurological: Normal speech, no focal findings noted; CN II - XII grossly intact. RLE with sensation to light touch, LLE with sensation to light touch.            Musculoskeletal: Digits/nail without cyanosis/clubbing.  Strength 5/5 BLE.                    Neck: Supple, no significant adenopathy                  Chest:  No wheezes, symmetric air entry. No use of accessory muscles               Cardiac: Normal rate and regular rhythm            Abdomen: Soft, nontender, nondistended      Extremities:   2+ R DP pulse, 2+ L DP pulse      1+ RLE edema, 1+ LLE edema    Skin:  RLE no ulcer; LLE no ulcer      RLE improved spider veins, LLE improved spider veins      RLE + varicose veins, LLE + varicose veins    CEAP 3/3    VCSS 4    LAB RESULTS:  No results found for: "CBC"  No results found for: "LABPROT", "INR"  Lab Results " "  Component Value Date     03/20/2024    K 4.3 03/20/2024     03/20/2024    CO2 24 03/20/2024     (H) 03/20/2024    BUN 18 03/20/2024    CREATININE 0.7 03/20/2024    CALCIUM 9.5 03/20/2024    ANIONGAP 10 03/20/2024    EGFRNONAA >60 04/13/2022     Lab Results   Component Value Date    WBC 10.70 03/20/2024    RBC 4.06 03/20/2024    HGB 12.3 03/20/2024    HCT 36.4 (L) 03/20/2024    MCV 90 03/20/2024    MCH 30.3 03/20/2024    MCHC 33.8 03/20/2024    RDW 13.5 03/20/2024     03/20/2024    MPV 10.2 03/20/2024    GRAN 6.5 03/20/2024    GRAN 60.6 03/20/2024    LYMPH 2.5 03/20/2024    LYMPH 23.3 03/20/2024    MONO 1.1 (H) 03/20/2024    MONO 10.2 03/20/2024    EOS 0.5 03/20/2024    BASO 0.08 03/20/2024    EOSINOPHIL 4.8 03/20/2024    BASOPHIL 0.7 03/20/2024    DIFFMETHOD Automated 03/20/2024     .No results found for: "HGBA1C"    IMAGING:  All pertinent imaging has been reviewed and interpreted independently.    Venous US 2022 Impression:  Color flow evaluation of the right lower extremity demonstrates no evidence of venous thrombosis in the deep or superficial veins. Significant reflux is not noted in the GSV.  Reflux does not include the saphenofemoral junction (SFJ).   Significant reflux is not noted in the SSV.  Reflux does not include the saphenopopliteal junction (SPJ).  There is no evidence of reflux in the Accessory vein.  Color flow evaluation of the left lower extremity demonstrates no evidence of venous thrombosis in the deep or superficial veins. Significant reflux is not noted in the GSV.  Reflux does not include the saphenofemoral junction (SFJ).   Significant reflux is not noted in the SSV.  Reflux does not include the saphenopopliteal junction (SPJ).  There is no evidence of reflux in the Accessory vein.    IMP/PLAN:  64 y.o. female with   Patient Active Problem List   Diagnosis    Varicose veins of legs    being managed by PCP and specialists who is here today for evaluation of " symptomatic BLE varicose and spider veins.    -symptomatic BLE varicose veins despite compression and elevation > 3 mo s/p RLE stab phlebectomy 6/2022  -recommend compression with Rx stockings, elevation, dietary changes associated with water and sodium intake discussed at length with patient  -Exercise   -BLE spider veins s/p BLE sclerotherapy  -s/p RLE stab phlebectomy of varicose veins 4/2024  -Derm referral for dermal laser BLE - does not desire   -rec LLE stab phlebectomy of varicose veins, saphenous tributaries    I spent 12 minutes evaluating this patient and greater than 50% of the time was spent counseling, coordinator care and discussing the plan of care.  All questions were answered and patient stated understanding with agreement with the above treatment plan.    Yovany Mccauley MD [facility]  Vascular and Endovascular Surgery

## 2024-08-28 ENCOUNTER — TELEPHONE (OUTPATIENT)
Dept: VASCULAR SURGERY | Facility: CLINIC | Age: 64
End: 2024-08-28
Payer: MEDICARE

## 2024-08-28 NOTE — TELEPHONE ENCOUNTER
Informed patient that [facility] is not a covered location under her new insurance of [employer]. Patient states she called and was told [facility] was in network. Provided her with the information from preservice - that Eusebia spoke with Adeb[facility]o [phone] and confirmed not a covered location. Patient will call .

## 2025-01-27 ENCOUNTER — TELEPHONE (OUTPATIENT)
Dept: ORTHOPEDICS | Facility: CLINIC | Age: 65
End: 2025-01-27
Payer: MEDICARE

## 2025-01-27 DIAGNOSIS — M25.519 SHOULDER PAIN: Primary | ICD-10-CM

## 2025-01-27 NOTE — TELEPHONE ENCOUNTER
----- Message from Marion sent at 1/27/2025  1:29 PM CST -----  Type:  Appointment Request      Name of Caller:pt  When is the first available appointment?n/a  Symptoms:right arm/hand pain  Best Call Back Number: [phone]  Additional Information: referred to [place]

## 2025-02-12 ENCOUNTER — TELEPHONE (OUTPATIENT)
Dept: ORTHOPEDICS | Facility: CLINIC | Age: 65
End: 2025-02-12
Payer: MEDICARE

## 2025-02-12 DIAGNOSIS — R52 PAIN: ICD-10-CM

## 2025-02-20 ENCOUNTER — HOSPITAL ENCOUNTER (OUTPATIENT)
Dept: RADIOLOGY | Facility: HOSPITAL | Age: 65
Discharge: HOME | End: 2025-02-20
Attending: ORTHOPAEDIC SURGERY
Payer: MEDICARE

## 2025-02-20 ENCOUNTER — OFFICE VISIT (OUTPATIENT)
Dept: ORTHOPEDICS | Facility: CLINIC | Age: 65
End: 2025-02-20
Payer: MEDICARE

## 2025-02-20 VITALS — HEIGHT: 64 IN | BODY MASS INDEX: 25.39 KG/M2

## 2025-02-20 DIAGNOSIS — M25.519 SHOULDER PAIN: ICD-10-CM

## 2025-02-20 DIAGNOSIS — R52 PAIN: ICD-10-CM

## 2025-02-20 DIAGNOSIS — S46.011A TRAUMATIC COMPLETE TEAR OF RIGHT ROTATOR CUFF, INITIAL ENCOUNTER: Primary | ICD-10-CM

## 2025-02-20 PROCEDURE — 73130 X-RAY EXAM OF HAND: CPT | Mod: TC,PN,RT

## 2025-02-20 PROCEDURE — 73030 X-RAY EXAM OF SHOULDER: CPT | Mod: TC,PN,RT

## 2025-02-20 RX ORDER — CEFAZOLIN SODIUM 2 G/50ML
2 SOLUTION INTRAVENOUS
OUTPATIENT
Start: 2025-02-20

## 2025-02-20 NOTE — PROGRESS NOTES
"CC:  Right rotator cuff tear  Right carpal tunnel syndrome        HPI:  Jaelyn Peter is a very pleasant 65 y.o. female with ongoing symptoms right shoulder for more than 10 years   She has been treated elsewhere for right shoulder pain and actually had an MRI scan several years ago showing a full-thickness rotator cuff tear   She tried physical therapy as well as injection without long-term relief   She is here today for follow up on the right shoulder and evaluation for possible surgery   On unrelated matter she is reporting numbness tingling in the right hand usually worse at night         PAST MEDICAL HISTORY:   Past Medical History:   Diagnosis Date    Diabetes mellitus     Hypertension     Lupus     Thyroid disease      PAST SURGICAL HISTORY:   Past Surgical History:   Procedure Laterality Date    APPENDECTOMY      COLONOSCOPY N/A 4/4/2024    Procedure: COLONOSCOPY;  Surgeon: Leigh Hazel MD;  [facility]: [facility];  Service: Endoscopy;  Laterality: N/A;    HERNIA REPAIR       FAMILY HISTORY:   Family History   Problem Relation Name Age of Onset    Diabetes Mother      Heart disease Mother      Cancer Father      Breast cancer Neg Hx       SOCIAL HISTORY: Social History[1]    MEDICATIONS: Current Medications[2]  ALLERGIES:   Review of patient's allergies indicates:   Allergen Reactions    Codeine Shortness Of Breath       Review of Systems:  Constitutional: no fever or chills  ENT: no nasal congestion or sore throat  Respiratory: no cough or shortness of breath  Cardiovascular: no chest pain or palpitations  Gastrointestinal: no nausea or vomiting, PUD, GERD, NSAID intolerance  Genitourinary: no hematuria or dysuria  Integument/Breast: no rash or pruritis  Hematologic/Lymphatic: no easy bruising or lymphadenopathy  Musculoskeletal: see HPI  Neurological: no seizures or tremors  Behavioral/Psych: no auditory or visual hallucinations      Physical Exam   Vitals:    02/20/25 0901   Height: 5' 4" " "(1.626 m)   PainSc:   8   PainLoc: Arm       Constitutional: Oriented to person, place, and time. Appears well-developed and well-nourished.   HENT:   Head: Normocephalic and atraumatic.   Nose: Nose normal.   Eyes: No scleral icterus.   Neck: Normal range of motion.   Cardiovascular: Normal rate and regular rhythm.    Pulses:       Radial pulses are 2+ on the right side, and 2+ on the left side.   Pulmonary/Chest: Effort normal and breath sounds normal.   Abdominal: Soft.   Neurological: Alert and oriented to person, place, and time.   Skin: Skin is warm.   Psychiatric: Normal mood and affect.     MUSCULOSKELETAL UPPER EXTREMITY:  Examination of the right shoulder mild tenderness of the AC joint   No bruising no swelling   Range of motion slightly decreased due to pain   Positive impingement sign  Positive supraspinatus stress test slight weakness rotator cuff no instability   No crepitation   Examination right hand shows mild swelling positive Tinel sign   Positive Phalen's test   Sensation intact   No atrophy               Diagnostic Studies:  MRI scan report showing evidence of full-thickness tear of the supraspinatus tendon as well as AC joint arthritis        Assessment:  1. Rotator cuff tear right shoulder chronic.      2. AC arthritis right shoulder.      3. Right carpal tunnel syndrome    Plan:  I explained the nature of these problems the patient   For the hand I have started her on a wrist brace at night ordered a nerve conduction study   For the right shoulder I have recommended shoulder arthroscopy and rotator cuff repair with probable AC joint resection as an outpatient surgery   She would like to set this up      The risks and benefits of surgery were discussed with the patient today and they understand.  The consent was signed in the office for surgery.      Sachin Bennett MD (Jay)  [facility]  Orthopedic Upper Extremity Surgery            [1]   Social History  Socioeconomic History    " "Marital status:    Tobacco Use    Smoking status: Never    Smokeless tobacco: Never   Substance and Sexual Activity    Alcohol use: Not Currently    Drug use: Never    Sexual activity: Not Currently     Partners: Male   [2]   Current Outpatient Medications:     albuterol (PROVENTIL/VENTOLIN HFA) 90 mcg/actuation inhaler, Inhale 2 puffs into the lungs every 6 (six) hours as needed for Wheezing. Cough and Shortness of Breath. Rescue, Disp: 6.7 g, Rfl: 1    atorvastatin (LIPITOR) 10 MG tablet, , Disp: , Rfl:     BD ULTRA-FINE SHORT PEN NEEDLE 31 gauge x 5/16" Ndle, SMARTSIG:Pre-Filled Pen Syringe SUB-Q 4 Times Daily, Disp: , Rfl:     fluticasone propionate (FLONASE) 50 mcg/actuation nasal spray, 2 sprays by Each Nostril route daily as needed., Disp: , Rfl:     insulin aspart U-100 (NOVOLOG) 100 unit/mL (3 mL) InPn pen, Inject 5 Units into the skin 3 (three) times daily with meals., Disp: , Rfl:     irbesartan-hydrochlorothiazide (AVALIDE) 300-12.5 mg per tablet, Take 1 tablet by mouth once daily., Disp: , Rfl:     LANTUS SOLOSTAR U-100 INSULIN glargine 100 units/mL (3mL) SubQ pen, Inject 30 Units into the skin 2 (two) times a day., Disp: , Rfl:     levothyroxine (SYNTHROID) 100 MCG tablet, Take 100 mcg by mouth before breakfast., Disp: , Rfl:     meclizine (ANTIVERT) 25 mg tablet, Take 1 tablet (25 mg total) by mouth 3 (three) times daily as needed for Dizziness., Disp: 20 tablet, Rfl: 0    MICRO THIN LANCETS 33 gauge Misc, , Disp: , Rfl:     MOUNJARO 7.5 mg/0.5 mL PnIj, SMARTSI.5 Milligram(s) SUB-Q Once a Week, Disp: , Rfl:     ONETOUCH VERIO Strp, , Disp: , Rfl:     pantoprazole (PROTONIX) 20 MG tablet, , Disp: , Rfl:     triamcinolone acetonide 0.1% (KENALOG) 0.1 % cream, SMARTSIG:Topical 1 to 2 Times Daily PRN, Disp: , Rfl:     ALPRAZolam (XANAX) 0.5 MG tablet, Take one tablet by mouth 1 hour before procedure and bring other tablet with you to the procedure. (Patient not taking: Reported on 2025), " Disp: 2 tablet, Rfl: 0    Current Facility-Administered Medications:     LIDOcaine HCL 10 mg/ml (1%) injection 1 mL, 1 mL, Other, 1 time in Clinic/HOD,     Facility-Administered Medications Ordered in Other Visits:     metoclopramide injection 10 mg, 10 mg, Intravenous, Once, Sarah Borja MD

## 2025-02-20 NOTE — H&P (VIEW-ONLY)
"CC:  Right rotator cuff tear  Right carpal tunnel syndrome        HPI:  Jaelyn Peter is a very pleasant 65 y.o. female with ongoing symptoms right shoulder for more than 10 years   She has been treated elsewhere for right shoulder pain and actually had an MRI scan several years ago showing a full-thickness rotator cuff tear   She tried physical therapy as well as injection without long-term relief   She is here today for follow up on the right shoulder and evaluation for possible surgery   On unrelated matter she is reporting numbness tingling in the right hand usually worse at night         PAST MEDICAL HISTORY:   Past Medical History:   Diagnosis Date    Diabetes mellitus     Hypertension     Lupus     Thyroid disease      PAST SURGICAL HISTORY:   Past Surgical History:   Procedure Laterality Date    APPENDECTOMY      COLONOSCOPY N/A 4/4/2024    Procedure: COLONOSCOPY;  Surgeon: Leigh Hazel MD;  Location: [facility];  Service: Endoscopy;  Laterality: N/A;    HERNIA REPAIR       FAMILY HISTORY:   Family History   Problem Relation Name Age of Onset    Diabetes Mother      Heart disease Mother      Cancer Father      Breast cancer Neg Hx       SOCIAL HISTORY: Social History[1]    MEDICATIONS: Current Medications[2]  ALLERGIES:   Review of patient's allergies indicates:   Allergen Reactions    Codeine Shortness Of Breath       Review of Systems:  Constitutional: no fever or chills  ENT: no nasal congestion or sore throat  Respiratory: no cough or shortness of breath  Cardiovascular: no chest pain or palpitations  Gastrointestinal: no nausea or vomiting, PUD, GERD, NSAID intolerance  Genitourinary: no hematuria or dysuria  Integument/Breast: no rash or pruritis  Hematologic/Lymphatic: no easy bruising or lymphadenopathy  Musculoskeletal: see HPI  Neurological: no seizures or tremors  Behavioral/Psych: no auditory or visual hallucinations      Physical Exam   Vitals:    02/20/25 0901   Height: 5' 4" " "(1.626 m)   PainSc:   8   PainLoc: Arm       Constitutional: Oriented to person, place, and time. Appears well-developed and well-nourished.   HENT:   Head: Normocephalic and atraumatic.   Nose: Nose normal.   Eyes: No scleral icterus.   Neck: Normal range of motion.   Cardiovascular: Normal rate and regular rhythm.    Pulses:       Radial pulses are 2+ on the right side, and 2+ on the left side.   Pulmonary/Chest: Effort normal and breath sounds normal.   Abdominal: Soft.   Neurological: Alert and oriented to person, place, and time.   Skin: Skin is warm.   Psychiatric: Normal mood and affect.     MUSCULOSKELETAL UPPER EXTREMITY:  Examination of the right shoulder mild tenderness of the AC joint   No bruising no swelling   Range of motion slightly decreased due to pain   Positive impingement sign  Positive supraspinatus stress test slight weakness rotator cuff no instability   No crepitation   Examination right hand shows mild swelling positive Tinel sign   Positive Phalen's test   Sensation intact   No atrophy               Diagnostic Studies:  MRI scan report showing evidence of full-thickness tear of the supraspinatus tendon as well as AC joint arthritis        Assessment:  1. Rotator cuff tear right shoulder chronic.      2. AC arthritis right shoulder.      3. Right carpal tunnel syndrome    Plan:  I explained the nature of these problems the patient   For the hand I have started her on a wrist brace at night ordered a nerve conduction study   For the right shoulder I have recommended shoulder arthroscopy and rotator cuff repair with probable AC joint resection as an outpatient surgery   She would like to set this up      The risks and benefits of surgery were discussed with the patient today and they understand.  The consent was signed in the office for surgery.      Sachin Bennett MD (Jay)  [facility]  Orthopedic Upper Extremity Surgery            [1]   Social History  Socioeconomic History    " "Marital status:    Tobacco Use    Smoking status: Never    Smokeless tobacco: Never   Substance and Sexual Activity    Alcohol use: Not Currently    Drug use: Never    Sexual activity: Not Currently     Partners: Male   [2]   Current Outpatient Medications:     albuterol (PROVENTIL/VENTOLIN HFA) 90 mcg/actuation inhaler, Inhale 2 puffs into the lungs every 6 (six) hours as needed for Wheezing. Cough and Shortness of Breath. Rescue, Disp: 6.7 g, Rfl: 1    atorvastatin (LIPITOR) 10 MG tablet, , Disp: , Rfl:     BD ULTRA-FINE SHORT PEN NEEDLE 31 gauge x 5/16" Ndle, SMARTSIG:Pre-Filled Pen Syringe SUB-Q 4 Times Daily, Disp: , Rfl:     fluticasone propionate (FLONASE) 50 mcg/actuation nasal spray, 2 sprays by Each Nostril route daily as needed., Disp: , Rfl:     insulin aspart U-100 (NOVOLOG) 100 unit/mL (3 mL) InPn pen, Inject 5 Units into the skin 3 (three) times daily with meals., Disp: , Rfl:     irbesartan-hydrochlorothiazide (AVALIDE) 300-12.5 mg per tablet, Take 1 tablet by mouth once daily., Disp: , Rfl:     LANTUS SOLOSTAR U-100 INSULIN glargine 100 units/mL (3mL) SubQ pen, Inject 30 Units into the skin 2 (two) times a day., Disp: , Rfl:     levothyroxine (SYNTHROID) 100 MCG tablet, Take 100 mcg by mouth before breakfast., Disp: , Rfl:     meclizine (ANTIVERT) 25 mg tablet, Take 1 tablet (25 mg total) by mouth 3 (three) times daily as needed for Dizziness., Disp: 20 tablet, Rfl: 0    MICRO THIN LANCETS 33 gauge Misc, , Disp: , Rfl:     MOUNJARO 7.5 mg/0.5 mL PnIj, SMARTSI.5 Milligram(s) SUB-Q Once a Week, Disp: , Rfl:     ONETOUCH VERIO Strp, , Disp: , Rfl:     pantoprazole (PROTONIX) 20 MG tablet, , Disp: , Rfl:     triamcinolone acetonide 0.1% (KENALOG) 0.1 % cream, SMARTSIG:Topical 1 to 2 Times Daily PRN, Disp: , Rfl:     ALPRAZolam (XANAX) 0.5 MG tablet, Take one tablet by mouth 1 hour before procedure and bring other tablet with you to the procedure. (Patient not taking: Reported on 2025), " Disp: 2 tablet, Rfl: 0    Current Facility-Administered Medications:     LIDOcaine HCL 10 mg/ml (1%) injection 1 mL, 1 mL, Other, 1 time in Clinic/HOD,     Facility-Administered Medications Ordered in Other Visits:     metoclopramide injection 10 mg, 10 mg, Intravenous, Once, Sarah Borja MD

## 2025-02-24 ENCOUNTER — TELEPHONE (OUTPATIENT)
Dept: PREADMISSION TESTING | Facility: HOSPITAL | Age: 65
End: 2025-02-24
Payer: MEDICARE

## 2025-02-24 DIAGNOSIS — Z79.4 TYPE 2 DIABETES MELLITUS WITHOUT COMPLICATION, WITH LONG-TERM CURRENT USE OF INSULIN: ICD-10-CM

## 2025-02-24 DIAGNOSIS — Z01.818 PRE-OP EVALUATION: Primary | ICD-10-CM

## 2025-02-24 DIAGNOSIS — E11.9 TYPE 2 DIABETES MELLITUS WITHOUT COMPLICATION, WITH LONG-TERM CURRENT USE OF INSULIN: ICD-10-CM

## 2025-02-26 ENCOUNTER — HOSPITAL ENCOUNTER (OUTPATIENT)
Dept: PREADMISSION TESTING | Facility: HOSPITAL | Age: 65
Discharge: HOME | End: 2025-02-26
Attending: NURSE PRACTITIONER
Payer: MEDICARE

## 2025-02-26 ENCOUNTER — TELEPHONE (OUTPATIENT)
Dept: PREADMISSION TESTING | Facility: HOSPITAL | Age: 65
End: 2025-02-26
Payer: MEDICARE

## 2025-02-26 ENCOUNTER — PATIENT MESSAGE (OUTPATIENT)
Dept: PREADMISSION TESTING | Facility: HOSPITAL | Age: 65
End: 2025-02-26

## 2025-02-26 ENCOUNTER — ANESTHESIA EVENT (OUTPATIENT)
Dept: SURGERY | Facility: HOSPITAL | Age: 65
End: 2025-02-26
Payer: MEDICARE

## 2025-02-26 ENCOUNTER — RESULTS FOLLOW-UP (OUTPATIENT)
Dept: ANESTHESIOLOGY | Facility: HOSPITAL | Age: 65
End: 2025-02-26

## 2025-02-26 DIAGNOSIS — E11.9 TYPE 2 DIABETES MELLITUS WITHOUT COMPLICATION, WITHOUT LONG-TERM CURRENT USE OF INSULIN: ICD-10-CM

## 2025-02-26 DIAGNOSIS — Z01.818 PRE-OP EVALUATION: Primary | ICD-10-CM

## 2025-02-26 RX ORDER — CARVEDILOL 12.5 MG/1
12.5 TABLET ORAL 2 TIMES DAILY
COMMUNITY
Start: 2025-01-26

## 2025-02-26 RX ORDER — FLASH GLUCOSE SENSOR
KIT MISCELLANEOUS
COMMUNITY
Start: 2025-01-19

## 2025-02-26 RX ORDER — PIOGLITAZONEHYDROCHLORIDE 30 MG/1
30 TABLET ORAL DAILY
COMMUNITY
Start: 2024-11-19

## 2025-02-26 RX ORDER — SEMAGLUTIDE 1.34 MG/ML
1 INJECTION, SOLUTION SUBCUTANEOUS
COMMUNITY
Start: 2025-02-08

## 2025-02-26 RX ORDER — EMPAGLIFLOZIN 25 MG/1
25 TABLET, FILM COATED ORAL DAILY
COMMUNITY
Start: 2025-02-01

## 2025-02-26 NOTE — ANESTHESIA PREPROCEDURE EVALUATION
"                                                                                                             02/26/2025  Jaelyn Peter is a 65 y.o., female scheduled for REPAIR, ROTATOR CUFF, ARTHROSCOPIC (Right: Shoulder)     POCT Glu 130  Hx of anesthetics in past without complications  NPO>8 hours  No food or drug allergies  Amenable to proceed with scheduled procedure    Past Medical History:   Diagnosis Date    Diabetes mellitus     Hypertension     Lupus     Thyroid disease      Past Surgical History:   Procedure Laterality Date    APPENDECTOMY      COLONOSCOPY N/A 4/4/2024    Procedure: COLONOSCOPY;  Surgeon: Leigh Hazel MD;  Location: [facility];  Service: Endoscopy;  Laterality: N/A;    HERNIA REPAIR       Review of patient's allergies indicates:   Allergen Reactions    Codeine Shortness Of Breath     Current Outpatient Medications   Medication Instructions    albuterol (PROVENTIL/VENTOLIN HFA) 90 mcg/actuation inhaler 2 puffs, Inhalation, Every 6 hours PRN, Cough and Shortness of Breath. Rescue    ALPRAZolam (XANAX) 0.5 MG tablet Take one tablet by mouth 1 hour before procedure and bring other tablet with you to the procedure.    atorvastatin (LIPITOR) 10 MG tablet No dose, route, or frequency recorded.    BD ULTRA-FINE SHORT PEN NEEDLE 31 gauge x 5/16" Ndle SMARTSIG:Pre-Filled Pen Syringe SUB-Q 4 Times Daily    carvediloL (COREG) 12.5 mg, 2 times daily    fluticasone propionate (FLONASE) 50 mcg/actuation nasal spray 2 sprays, Daily PRN    FREESTYLE ELVER 2 SENSOR Kit CHECK BLOOD SUGAR FOUR TIMES DAILY    insulin aspart U-100 (NOVOLOG) 5 Units, 3 times daily with meals    irbesartan-hydrochlorothiazide (AVALIDE) 300-12.5 mg per tablet 1 tablet, Daily    JARDIANCE 25 mg, Daily    LANTUS SOLOSTAR U-100 INSULIN 30 Units, 2 times daily    levothyroxine (SYNTHROID) 100 mcg, Before breakfast    meclizine (ANTIVERT) 25 mg, Oral, 3 times daily PRN    MICRO THIN LANCETS 33 gauge Misc No dose, route, " or frequency recorded.    MOUNJARO 7.5 mg/0.5 mL PnIj SMARTSI.5 Milligram(s) SUB-Q Once a Week    ONETOUCH VERIO Strp No dose, route, or frequency recorded.    OZEMPIC 1 mg, Every 7 days    pantoprazole (PROTONIX) 20 MG tablet No dose, route, or frequency recorded.    triamcinolone acetonide 0.1% (KENALOG) 0.1 % cream SMARTSIG:Topical 1 to 2 Times Daily PRN       Pre-op Assessment    I have reviewed the Patient Summary Reports.     I have reviewed the Nursing Notes. I have reviewed the NPO Status.   I have reviewed the Medications.     Review of Systems  Anesthesia Hx:  No problems with previous Anesthesia   History of prior surgery of interest to airway management or planning:          Denies Family Hx of Anesthesia complications.   Personal Hx of Anesthesia complications, Post-Operative Nausea/Vomiting, in the past, but not with recent anesthetics / prophylaxis                    Social:  Non-Smoker, No Alcohol Use       Cardiovascular:  Exercise tolerance: good   Denies Pacemaker. Hypertension       Denies Angina.    PVD hyperlipidemia                               Pulmonary:       Denies Shortness of breath.                  Hepatic/GI:         Taking GLP-1 Agonists (last dose ozempic on ) Instructed to Hold for 7 Days           Musculoskeletal:  Arthritis               Neurological:  Neurology Normal Denies TIA.  Denies CVA.    Denies Seizures.                                Endocrine:  Diabetes (a1c 7.3 25), type 2, using insulin Hypothyroidism              Physical Exam  General: Cooperative, Well nourished, Alert and Oriented    Airway:  Mallampati: II / II  Mouth Opening: Small, but > 3cm  TM Distance: Normal  Tongue: Normal  Neck ROM: Normal ROM    Dental:  Partial Dentures  Upper and lower dentures    Lab Results   Component Value Date    WBC 10.70 2024    HGB 12.3 2024    HCT 36.4 (L) 2024     2024    ALT 23 2024    AST 20 2024     2024     K 4.3 03/20/2024     03/20/2024    CREATININE 0.7 03/20/2024    BUN 18 03/20/2024    CO2 24 03/20/2024    HGBA1C 7.3 (H) 02/26/2025     Results for orders placed or performed during the hospital encounter of 03/20/24   EKG 12-lead    Collection Time: 03/20/24 11:28 AM   Result Value Ref Range    QRS Duration 98 ms    OHS QTC Calculation 421 ms    Narrative    Test Reason : R55,    Vent. Rate : 070 BPM     Atrial Rate : 070 BPM     P-R Int : 162 ms          QRS Dur : 098 ms      QT Int : 390 ms       P-R-T Axes : 044 043 042 degrees     QTc Int : 421 ms    Normal sinus rhythm  Normal variant, early repolarization  When compared with ECG of 07-FEB-2024 12:27,  No significant change was found  Confirmed by Benjamín Mukherjee MD ([id number]) on 3/21/2024 4:56:00 PM    Referred By: [email or web address]   SELF           Confirmed By:Benjamín Mukherjee MD         Anesthesia Plan  Type of Anesthesia, risks & benefits discussed:    Anesthesia Type: Gen ETT, Regional  Intra-op Monitoring Plan: Standard ASA Monitors  Post Op Pain Control Plan: multimodal analgesia, peripheral nerve block and IV/PO Opioids PRN  Induction:  IV  Airway Plan: Video and Direct, Post-Induction  Informed Consent: Informed consent signed with the Patient and all parties understand the risks and agree with anesthesia plan.  All questions answered. Patient consented to blood products? No  ASA Score: 2  Anesthesia Plan Notes: Anesthesia consent to be signed prior to surgery 3/11/25      Ready For Surgery From Anesthesia Perspective.     .

## 2025-02-26 NOTE — DISCHARGE INSTRUCTIONS

## 2025-02-26 NOTE — PRE-PROCEDURE INSTRUCTIONS
Sister.    Allergies, medical, surgical, family and psychosocial histories reviewed with patient. Periop plan of care reviewed. Preop instructions given, including medications to take and to hold. Hibiclens soap and instructions on use given. Time allotted for questions to be addressed.      Arrival time 1030.     Please take Irbesartan, Levothyroxine, Pantoprazole, and Lantus 6 units the morning of surgery. Stop Mounjaro on 2/27/25.     Surgery instructions reviewed and surgery booklet sent or emailed to the patient via the portal.

## 2025-03-11 ENCOUNTER — ANESTHESIA (OUTPATIENT)
Dept: SURGERY | Facility: HOSPITAL | Age: 65
End: 2025-03-11
Payer: MEDICARE

## 2025-03-11 ENCOUNTER — HOSPITAL ENCOUNTER (OUTPATIENT)
Facility: HOSPITAL | Age: 65
Discharge: HOME | End: 2025-03-11
Attending: ORTHOPAEDIC SURGERY | Admitting: ORTHOPAEDIC SURGERY
Payer: MEDICARE

## 2025-03-11 VITALS
DIASTOLIC BLOOD PRESSURE: 86 MMHG | RESPIRATION RATE: 17 BRPM | HEIGHT: 62 IN | SYSTOLIC BLOOD PRESSURE: 147 MMHG | WEIGHT: 156 LBS | OXYGEN SATURATION: 100 % | TEMPERATURE: 98 F | HEART RATE: 62 BPM | BODY MASS INDEX: 28.71 KG/M2

## 2025-03-11 DIAGNOSIS — S46.011A TRAUMATIC COMPLETE TEAR OF RIGHT ROTATOR CUFF, INITIAL ENCOUNTER: ICD-10-CM

## 2025-03-11 LAB — POCT GLUCOSE: 132 MG/DL (ref 70–110)

## 2025-03-11 PROCEDURE — 29826 SHO ARTHRS SRG DECOMPRESSION: CPT | Mod: AS,RT,,

## 2025-03-11 PROCEDURE — 63600175 PHARM REV CODE 636 W HCPCS: Mod: JZ,TB | Performed by: ORTHOPAEDIC SURGERY

## 2025-03-11 PROCEDURE — 37000008 HC ANESTHESIA 1ST 15 MINUTES: Performed by: ORTHOPAEDIC SURGERY

## 2025-03-11 PROCEDURE — 71000016 HC POSTOP RECOV ADDL HR: Performed by: ORTHOPAEDIC SURGERY

## 2025-03-11 PROCEDURE — 63600175 PHARM REV CODE 636 W HCPCS

## 2025-03-11 PROCEDURE — 25000003 PHARM REV CODE 250: Performed by: ORTHOPAEDIC SURGERY

## 2025-03-11 PROCEDURE — 27201423 OPTIME MED/SURG SUP & DEVICES STERILE SUPPLY: Performed by: ORTHOPAEDIC SURGERY

## 2025-03-11 PROCEDURE — 63600175 PHARM REV CODE 636 W HCPCS: Performed by: UROLOGY

## 2025-03-11 PROCEDURE — 29824 SHO ARTHRS SRG DSTL CLAVICLC: CPT | Mod: 51,RT,, | Performed by: ORTHOPAEDIC SURGERY

## 2025-03-11 PROCEDURE — 29826 SHO ARTHRS SRG DECOMPRESSION: CPT | Mod: RT,,, | Performed by: ORTHOPAEDIC SURGERY

## 2025-03-11 PROCEDURE — 36000710: Performed by: ORTHOPAEDIC SURGERY

## 2025-03-11 PROCEDURE — 93005 ELECTROCARDIOGRAM TRACING: CPT

## 2025-03-11 PROCEDURE — 29827 SHO ARTHRS SRG RT8TR CUF RPR: CPT | Mod: AS,RT,,

## 2025-03-11 PROCEDURE — 36000711: Performed by: ORTHOPAEDIC SURGERY

## 2025-03-11 PROCEDURE — 25000003 PHARM REV CODE 250

## 2025-03-11 PROCEDURE — 71000033 HC RECOVERY, INTIAL HOUR: Performed by: ORTHOPAEDIC SURGERY

## 2025-03-11 PROCEDURE — 25000003 PHARM REV CODE 250: Performed by: NURSE ANESTHETIST, CERTIFIED REGISTERED

## 2025-03-11 PROCEDURE — 29827 SHO ARTHRS SRG RT8TR CUF RPR: CPT | Mod: RT,,, | Performed by: ORTHOPAEDIC SURGERY

## 2025-03-11 PROCEDURE — 71000015 HC POSTOP RECOV 1ST HR: Performed by: ORTHOPAEDIC SURGERY

## 2025-03-11 PROCEDURE — 64415 NJX AA&/STRD BRCH PLXS IMG: CPT | Performed by: UROLOGY

## 2025-03-11 PROCEDURE — 63600175 PHARM REV CODE 636 W HCPCS: Performed by: NURSE ANESTHETIST, CERTIFIED REGISTERED

## 2025-03-11 PROCEDURE — C1713 ANCHOR/SCREW BN/BN,TIS/BN: HCPCS | Performed by: ORTHOPAEDIC SURGERY

## 2025-03-11 PROCEDURE — 37000009 HC ANESTHESIA EA ADD 15 MINS: Performed by: ORTHOPAEDIC SURGERY

## 2025-03-11 PROCEDURE — 29824 SHO ARTHRS SRG DSTL CLAVICLC: CPT | Mod: AS,51,RT,

## 2025-03-11 DEVICE — MAGNUM 2 KNOTLESS IMPLANT
Type: IMPLANTABLE DEVICE | Site: SHOULDER | Status: FUNCTIONAL
Brand: MAGNUM

## 2025-03-11 RX ORDER — LIDOCAINE HYDROCHLORIDE 20 MG/ML
INJECTION, SOLUTION EPIDURAL; INFILTRATION; INTRACAUDAL; PERINEURAL
Status: DISCONTINUED | OUTPATIENT
Start: 2025-03-11 | End: 2025-03-11

## 2025-03-11 RX ORDER — BUPIVACAINE 13.3 MG/ML
INJECTION, SUSPENSION, LIPOSOMAL INFILTRATION
Status: DISCONTINUED | OUTPATIENT
Start: 2025-03-11 | End: 2025-03-11

## 2025-03-11 RX ORDER — FENTANYL CITRATE 50 UG/ML
25 INJECTION, SOLUTION INTRAMUSCULAR; INTRAVENOUS EVERY 5 MIN PRN
Status: DISCONTINUED | OUTPATIENT
Start: 2025-03-11 | End: 2025-03-11 | Stop reason: HOSPADM

## 2025-03-11 RX ORDER — CEFAZOLIN 2 G/1
2 INJECTION, POWDER, FOR SOLUTION INTRAMUSCULAR; INTRAVENOUS
Status: DISCONTINUED | OUTPATIENT
Start: 2025-03-11 | End: 2025-03-11 | Stop reason: HOSPADM

## 2025-03-11 RX ORDER — ACETAMINOPHEN 325 MG/1
650 TABLET ORAL EVERY 4 HOURS PRN
Status: DISCONTINUED | OUTPATIENT
Start: 2025-03-11 | End: 2025-03-11 | Stop reason: HOSPADM

## 2025-03-11 RX ORDER — METHOCARBAMOL 100 MG/ML
500 INJECTION, SOLUTION INTRAMUSCULAR; INTRAVENOUS ONCE AS NEEDED
Status: DISCONTINUED | OUTPATIENT
Start: 2025-03-11 | End: 2025-03-11 | Stop reason: HOSPADM

## 2025-03-11 RX ORDER — GLUCAGON 1 MG
1 KIT INJECTION
Status: DISCONTINUED | OUTPATIENT
Start: 2025-03-11 | End: 2025-03-11 | Stop reason: HOSPADM

## 2025-03-11 RX ORDER — FENTANYL CITRATE 50 UG/ML
INJECTION, SOLUTION INTRAMUSCULAR; INTRAVENOUS
Status: DISCONTINUED | OUTPATIENT
Start: 2025-03-11 | End: 2025-03-11

## 2025-03-11 RX ORDER — OXYCODONE HYDROCHLORIDE 10 MG/1
10 TABLET ORAL EVERY 4 HOURS PRN
Status: DISCONTINUED | OUTPATIENT
Start: 2025-03-11 | End: 2025-03-11 | Stop reason: HOSPADM

## 2025-03-11 RX ORDER — PROPOFOL 10 MG/ML
VIAL (ML) INTRAVENOUS
Status: DISCONTINUED | OUTPATIENT
Start: 2025-03-11 | End: 2025-03-11

## 2025-03-11 RX ORDER — KETOROLAC TROMETHAMINE 30 MG/ML
30 INJECTION, SOLUTION INTRAMUSCULAR; INTRAVENOUS ONCE
Status: COMPLETED | OUTPATIENT
Start: 2025-03-11 | End: 2025-03-11

## 2025-03-11 RX ORDER — SODIUM CHLORIDE 0.9 % (FLUSH) 0.9 %
10 SYRINGE (ML) INJECTION
Status: DISCONTINUED | OUTPATIENT
Start: 2025-03-11 | End: 2025-03-11 | Stop reason: HOSPADM

## 2025-03-11 RX ORDER — BUPIVACAINE HYDROCHLORIDE 2.5 MG/ML
INJECTION, SOLUTION EPIDURAL; INFILTRATION; INTRACAUDAL; PERINEURAL
Status: COMPLETED | OUTPATIENT
Start: 2025-03-11 | End: 2025-03-11

## 2025-03-11 RX ORDER — LIDOCAINE HYDROCHLORIDE 10 MG/ML
1 INJECTION, SOLUTION EPIDURAL; INFILTRATION; INTRACAUDAL; PERINEURAL ONCE
Status: DISCONTINUED | OUTPATIENT
Start: 2025-03-11 | End: 2025-03-11 | Stop reason: HOSPADM

## 2025-03-11 RX ORDER — MIDAZOLAM HYDROCHLORIDE 1 MG/ML
INJECTION INTRAMUSCULAR; INTRAVENOUS
Status: DISCONTINUED | OUTPATIENT
Start: 2025-03-11 | End: 2025-03-11

## 2025-03-11 RX ORDER — CEFAZOLIN 2 G/1
INJECTION, POWDER, FOR SOLUTION INTRAMUSCULAR; INTRAVENOUS
Status: DISCONTINUED | OUTPATIENT
Start: 2025-03-11 | End: 2025-03-11

## 2025-03-11 RX ORDER — ACETAMINOPHEN 500 MG
1000 TABLET ORAL
Status: COMPLETED | OUTPATIENT
Start: 2025-03-11 | End: 2025-03-11

## 2025-03-11 RX ORDER — ONDANSETRON HYDROCHLORIDE 2 MG/ML
INJECTION, SOLUTION INTRAVENOUS
Status: DISCONTINUED | OUTPATIENT
Start: 2025-03-11 | End: 2025-03-11

## 2025-03-11 RX ORDER — HYDROCODONE BITARTRATE AND ACETAMINOPHEN 7.5; 325 MG/1; MG/1
1 TABLET ORAL EVERY 4 HOURS PRN
Qty: 30 TABLET | Refills: 0 | Status: SHIPPED | OUTPATIENT
Start: 2025-03-11

## 2025-03-11 RX ORDER — ROCURONIUM BROMIDE 10 MG/ML
INJECTION, SOLUTION INTRAVENOUS
Status: DISCONTINUED | OUTPATIENT
Start: 2025-03-11 | End: 2025-03-11

## 2025-03-11 RX ORDER — PROCHLORPERAZINE EDISYLATE 5 MG/ML
5 INJECTION INTRAMUSCULAR; INTRAVENOUS EVERY 30 MIN PRN
Status: DISCONTINUED | OUTPATIENT
Start: 2025-03-11 | End: 2025-03-11 | Stop reason: HOSPADM

## 2025-03-11 RX ORDER — EPINEPHRINE 1 MG/ML
INJECTION, SOLUTION, CONCENTRATE INTRAVENOUS
Status: DISCONTINUED | OUTPATIENT
Start: 2025-03-11 | End: 2025-03-11 | Stop reason: HOSPADM

## 2025-03-11 RX ORDER — ONDANSETRON 8 MG/1
8 TABLET, ORALLY DISINTEGRATING ORAL EVERY 8 HOURS PRN
Status: DISCONTINUED | OUTPATIENT
Start: 2025-03-11 | End: 2025-03-11 | Stop reason: HOSPADM

## 2025-03-11 RX ORDER — DEXAMETHASONE SODIUM PHOSPHATE 4 MG/ML
INJECTION, SOLUTION INTRA-ARTICULAR; INTRALESIONAL; INTRAMUSCULAR; INTRAVENOUS; SOFT TISSUE
Status: DISCONTINUED | OUTPATIENT
Start: 2025-03-11 | End: 2025-03-11

## 2025-03-11 RX ORDER — PHENYLEPHRINE HYDROCHLORIDE 10 MG/ML
INJECTION INTRAVENOUS
Status: DISCONTINUED | OUTPATIENT
Start: 2025-03-11 | End: 2025-03-11

## 2025-03-11 RX ADMIN — CEFAZOLIN 2 G: 330 INJECTION, POWDER, FOR SOLUTION INTRAMUSCULAR; INTRAVENOUS at 02:03

## 2025-03-11 RX ADMIN — OXYCODONE HYDROCHLORIDE 10 MG: 10 TABLET ORAL at 04:03

## 2025-03-11 RX ADMIN — SODIUM CHLORIDE: 0.9 INJECTION, SOLUTION INTRAVENOUS at 01:03

## 2025-03-11 RX ADMIN — ACETAMINOPHEN 1000 MG: 500 TABLET ORAL at 01:03

## 2025-03-11 RX ADMIN — PROPOFOL 150 MG: 10 INJECTION, EMULSION INTRAVENOUS at 02:03

## 2025-03-11 RX ADMIN — PHENYLEPHRINE HYDROCHLORIDE 100 MCG: 10 INJECTION INTRAVENOUS at 03:03

## 2025-03-11 RX ADMIN — SODIUM CHLORIDE, SODIUM LACTATE, POTASSIUM CHLORIDE, AND CALCIUM CHLORIDE: .6; .31; .03; .02 INJECTION, SOLUTION INTRAVENOUS at 02:03

## 2025-03-11 RX ADMIN — ROCURONIUM BROMIDE 50 MG: 10 INJECTION INTRAVENOUS at 02:03

## 2025-03-11 RX ADMIN — BUPIVACAINE HYDROCHLORIDE 10 ML: 2.5 INJECTION, SOLUTION EPIDURAL; INFILTRATION; INTRACAUDAL; PERINEURAL at 01:03

## 2025-03-11 RX ADMIN — DEXAMETHASONE SODIUM PHOSPHATE 4 MG: 4 INJECTION, SOLUTION INTRA-ARTICULAR; INTRALESIONAL; INTRAMUSCULAR; INTRAVENOUS; SOFT TISSUE at 02:03

## 2025-03-11 RX ADMIN — FENTANYL CITRATE 100 MCG: 50 INJECTION INTRAMUSCULAR; INTRAVENOUS at 02:03

## 2025-03-11 RX ADMIN — BUPIVACAINE 10 ML: 13.3 INJECTION, SUSPENSION, LIPOSOMAL INFILTRATION at 01:03

## 2025-03-11 RX ADMIN — KETOROLAC TROMETHAMINE 30 MG: 30 INJECTION, SOLUTION INTRAMUSCULAR; INTRAVENOUS at 04:03

## 2025-03-11 RX ADMIN — PHENYLEPHRINE HYDROCHLORIDE 0.3 MCG/KG/MIN: 10 INJECTION INTRAVENOUS at 03:03

## 2025-03-11 RX ADMIN — PHENYLEPHRINE HYDROCHLORIDE 50 MCG: 10 INJECTION INTRAVENOUS at 02:03

## 2025-03-11 RX ADMIN — MIDAZOLAM HYDROCHLORIDE 2 MG: 1 INJECTION, SOLUTION INTRAMUSCULAR; INTRAVENOUS at 01:03

## 2025-03-11 RX ADMIN — LIDOCAINE HYDROCHLORIDE 100 MG: 20 INJECTION, SOLUTION EPIDURAL; INFILTRATION; INTRACAUDAL; PERINEURAL at 02:03

## 2025-03-11 RX ADMIN — SUGAMMADEX 200 MG: 100 INJECTION, SOLUTION INTRAVENOUS at 03:03

## 2025-03-11 RX ADMIN — ONDANSETRON 4 MG: 2 INJECTION INTRAMUSCULAR; INTRAVENOUS at 03:03

## 2025-03-11 NOTE — PLAN OF CARE
Discharge criteria met. Voicing desire to go home. Discharge instructions given to patient and sister. Verbalized understanding. All lines removed. Vital signs stable. Pain, nausea, vomiting controlled. Patient discharged per wheelchair in care of sister.

## 2025-03-11 NOTE — ANESTHESIA POSTPROCEDURE EVALUATION
Anesthesia Post Evaluation    Patient: Jaelyn Peter    Procedure(s) Performed: Procedure(s) (LRB):  REPAIR, ROTATOR CUFF, ARTHROSCOPIC (Right)  ARTHROSCOPY, SHOULDER, WITH SUBACROMIAL SPACE DECOMPRESSION (Right)  RESECTION, ACROMIOCLAVICULAR JOINT, ARTHROSCOPIC (Right)    Final Anesthesia Type: general      Patient location during evaluation: PACU  Patient participation: Yes- Able to Participate  Level of consciousness: awake and alert, oriented and awake  Post-procedure vital signs: reviewed and stable  Pain management: adequate  Airway patency: patent  DAREN mitigation strategies: Multimodal analgesia  PONV status at discharge: No PONV  Anesthetic complications: no      Cardiovascular status: blood pressure returned to baseline and hemodynamically stable  Respiratory status: unassisted and spontaneous ventilation  Hydration status: euvolemic  Follow-up not needed.              Vitals Value Taken Time   /86 03/11/25 16:40   Temp 36.3 °C (97.3 °F) 03/11/25 16:20   Pulse 68 03/11/25 16:44   Resp 16 03/11/25 16:44   SpO2 98 % 03/11/25 16:44   Vitals shown include unfiled device data.      No case tracking events are documented in the log.      Pain/Jacque Score: Pain Rating Prior to Med Admin: 8 (3/11/2025  4:42 PM)  Jacque Score: 10 (3/11/2025  4:30 PM)

## 2025-03-11 NOTE — PLAN OF CARE
Right interscaline block performed per anesthesia.  Patient tolerated well.  No complications noted.  Patient monitored until taken to surgery as planned.

## 2025-03-11 NOTE — OPERATIVE NOTE ADDENDUM
Certification of Assistant at Surgery       Surgery Date: 3/11/2025     Participating Surgeons:  Surgeons and Role:     * Sachin Bennett Jr., MD - Primary    Procedures:  Procedure(s) (LRB):  REPAIR, ROTATOR CUFF, ARTHROSCOPIC (Right)  ARTHROSCOPY, SHOULDER, WITH SUBACROMIAL SPACE DECOMPRESSION (Right)    Assistant Surgeon's Certification of Necessity:  I understand that section 1842 (b) (6) (d) of the Social Security Act generally prohibits Medicare Part B reasonable charge payment for the services of assistants at surgery in teaching hospitals when qualified residents are available to furnish such services. I certify that the services for which payment is claimed were medically necessary, and that no qualified resident was available to perform the services. I further understand that these services are subject to post-payment review by the Medicare carrier.      Jsesica Snow PA-C    03/11/2025  4:14 PM

## 2025-03-11 NOTE — OP NOTE
[facility] - Surgery (Hospital)  Operative Note      Date of Procedure: 3/11/2025     Procedure: Procedure(s) (LRB):  REPAIR, ROTATOR CUFF, ARTHROSCOPIC (Right)  ARTHROSCOPY, SHOULDER, WITH SUBACROMIAL SPACE DECOMPRESSION (Right)     Surgeons and Role:     * Sachin Bennett Jr., MD - Primary    Assisting Surgeon: None    Pre-Operative Diagnosis: Traumatic complete tear of right rotator cuff, initial encounter [S46.011A]    Post-Operative Diagnosis: Post-Op Diagnosis Codes:     * Traumatic complete tear of right rotator cuff, initial encounter [S46.011A]    Anesthesia: General/Regional    Operative Findings (including complications, if any): rot cuff tear right    Description of Technical Procedures:     DATE OF PROCEDURE:   3/11/2025     PREOPERATIVE DIAGNOSES:  1.  Rotator cuff tear, right shoulder.  2.  Acromioclavicular arthritis, right shoulder.     POSTOPERATIVE DIAGNOSES:  1.  Rotator cuff tear,right shoulder.  2.  Acromioclavicular arthritis, right shoulder.     OPERATIVE PROCEDURES:  1.  right shoulder arthroscopy with subacromial decompression.  2.  right shoulder arthroscopic rotator cuff repair using Opus suture anchors X 3.  3.  right shoulder arthroscopic acromioclavicular joint resection (mini   Mackenzie).     SURGEON:  Sachin Bennett Jr., M.D.     FIRST ASSISTANT:  Gwendolyn     ANESTHESIA:  General endotracheal.     ESTIMATED BLOOD LOSS:  Minimal.     COMPLICATIONS:  None.     SPECIMENS:  None.     BRIEF INDICATIONS:  A 65-year-old female with a rotator cuff tear,right shoulder, taken to surgery for the above procedure.     OPERATIVE PROCEDURE IN DETAIL:  After operative consent was obtained, the   patient brought to the Operating Room, placed supine on the operating room   table.  Anesthesia by GET method was performed by the Anesthesia staff.  After   the patient was asleep, carefully turned to lateral decubitus position and   stabilized on the bean bag, the involved shoulder was then prepped and draped    out in the normal sterile fashion and then suspended to longitudinal traction 12   pounds.     Following this, a posterolateral stab incision was made behind the acromion and   the scope inserted into the shoulder joint.  Diagnostic arthroscopy of the   shoulder showed a full-thickness rotator cuff tear.  The lateral portal was   created with a #15 blade and a sucker shaver inserted laterally and a complete   bursectomy performed including removal of the CA ligament.  Hemostasis achieved   with the Bovie.  The leading edge of the rotator cuff tear was freshened up and   the greater tuberosity was prepared.     Suture passers were then used to place inverted horizontal mattress sutures in   the leading edge of the rotator cuff.  A superior portal was then created and   drilling followed by insertion of the suture anchors laterally passing the   sutures through the anchor tightening up bringing the rotator cuff down flush to   bone where the anchors were locked in place and sutures cut short.  The repair   was noted to be watertight.  Range of motion was checked and noted to be full   without impingement.     The sg was then brought in laterally and an acromioplasty was performed from   lateral to medial, decompressing the subacromial space all the way to the AC   joint.  The AC joint was severely degenerative and therefore the distal clavicle   was resected using the bur and an anterior portal to remove the distal 5 to 8   mm of clavicle and including the undersurface.  After fully decompressing, all   bony surfaces were carefully smoothed out.  Excess fluid and debris evacuated.    The instruments were removed and the portals then closed using interrupted 3-0   nylon suture in the skin.  Sterile dressing applied followed by a pillow sling.    The patient extubated and brought to the Recovery Room in stable condition.    All sponge and needle counts reported as correct.  No complications.          Significant  Surgical Tasks Conducted by the Assistant(s), if Applicable: scope    Estimated Blood Loss (EBL): * No values recorded between 3/11/2025  2:36 PM and 3/11/2025  4:14 PM *           Implants:   Implant Name Type Inv. Item Serial No.  Lot No. LRB No. Used Action   ANCHOR SUT KNOTLESS MAG 2 - [id number]  ANCHOR SUT KNOTLESS MAG 2  [employer] [id number] Right 1 Implanted   ANCHOR SUT KNOTLESS MAG 2 - [id number]  ANCHOR SUT KNOTLESS MAG 2  [employer] [id number] Right 1 Implanted   ANCHOR SUT KNOTLESS MAG 2 - [id number]  ANCHOR SUT KNOTLESS MAG 2  [employer] [id number] Right 1 Implanted       Specimens:   Specimen (24h ago, onward)      None                    Condition: Good    Disposition: PACU - hemodynamically stable.    Attestation: I was present and scrubbed for the entire procedure.    Discharge Note    OUTCOME: Patient tolerated treatment/procedure well without complication and is now ready for discharge.    DISPOSITION: Home or Self Care    FINAL DIAGNOSIS:  Traumatic complete tear of right rotator cuff    FOLLOWUP: In clinic    DISCHARGE INSTRUCTIONS:    Discharge Procedure Orders   Diet general     Call MD for:  temperature >100.4     Call MD for:  persistent nausea and vomiting     Call MD for:  severe uncontrolled pain     Keep surgical extremity elevated     Remove dressing in 48 hours        05-Feb-2019 13:26

## 2025-03-11 NOTE — TRANSFER OF CARE
"Anesthesia Transfer of Care Note    Patient: Jaelyn Peter    Procedure(s) Performed: Procedure(s) (LRB):  REPAIR, ROTATOR CUFF, ARTHROSCOPIC (Right)  ARTHROSCOPY, SHOULDER, WITH SUBACROMIAL SPACE DECOMPRESSION (Right)    Patient location: PACU    Anesthesia Type: general    Transport from OR: Transported from OR on 6-10 L/min O2 by face mask with adequate spontaneous ventilation    Post pain: adequate analgesia    Post assessment: no apparent anesthetic complications and tolerated procedure well    Post vital signs: stable    Level of consciousness: awake and responds to stimulation    Nausea/Vomiting: no nausea/vomiting    Complications: none    Transfer of care protocol was followed      Last vitals: Visit Vitals  BP (!) 143/84   Pulse 76   Temp 36.7 °C (98.1 °F) (Skin)   Resp 16   Ht 5' 2" (1.575 m)   Wt 70.8 kg (156 lb)   LMP  (LMP Unknown)   SpO2 95%   Breastfeeding No   BMI 28.53 kg/m²     "

## 2025-03-11 NOTE — ANESTHESIA PROCEDURE NOTES
Right Interscalene SS Block    Patient location during procedure: pre-op   Block not for primary anesthetic.  Reason for block: at surgeon's request and post-op pain management   Post-op Pain Location: Right Shoulder Pain   Start time: 3/11/2025 1:50 PM  Timeout: 3/11/2025 1:50 PM   End time: 3/11/2025 1:55 PM    Staffing  Authorizing Provider: Florian Cruz MD  Performing Provider: Florian Cruz MD    Staffing  Performed by: Florian Cruz MD  Authorized by: Florian Cruz MD    Preanesthetic Checklist  Completed: patient identified, IV checked, site marked, risks and benefits discussed, surgical consent, monitors and equipment checked, pre-op evaluation and timeout performed  Peripheral Block  Patient position: sitting  Prep: ChloraPrep  Patient monitoring: heart rate, cardiac monitor, continuous pulse ox, continuous capnometry and frequent blood pressure checks  Block type: interscalene  Laterality: right  Injection technique: single shot  Needle  Needle type: Stimuplex   Needle gauge: 22 G  Needle length: 4 in  Needle localization: anatomical landmarks and ultrasound guidance   -ultrasound image captured on disc.  Assessment  Injection assessment: negative aspiration, negative parasthesia and local visualized surrounding nerve  Paresthesia pain: none  Heart rate change: no  Slow fractionated injection: yes  Pain Tolerance: comfortable throughout block and no complaints  Medications:    Medications: bupivacaine (pf) (MARCAINE) injection 0.25% - Perineural   10 mL - 3/11/2025 1:55:00 PM    Additional Notes  VSS.  [facility] RN monitoring vitals throughout procedure.  Patient tolerated procedure well.

## 2025-03-12 LAB
OHS QRS DURATION: 90 MS
OHS QTC CALCULATION: 403 MS

## 2025-03-24 NOTE — PROGRESS NOTES
Subjective:     Patient ID: Jaelyn Peter is a 65 y.o. female.    Chief Complaint: Postop Follow Up    HPI:   Jaelyn Peter  returns for a postop visit approximately 2 weeks following:    Surgery: RIGHT rotator cuff repair (x3 anchor) with subacromial decompression and AC joint resection  Date of Surgery:  3-  Surgeon: Dr. Bennett    Overall, the patient is doing well with no complaints of fever, chills, or drainage to surgical incision. The patient reports that pain has been managed with medication, and she requests a refill today. Pt has not begun formal PT yet, but maintains compliance with activity restrictions and UE sling. Post-operative complaints include: shoulder soreness    Past Medical History:   Diagnosis Date    Diabetes mellitus     Hypertension     Lupus     Thyroid disease      Current Medications[1]  Review of patient's allergies indicates:   Allergen Reactions    Codeine Shortness Of Breath     Patient can take Tramadol and oxycodone without complications       Review of Systems   Constitutional:  Negative for chills and fever.   Respiratory:  Negative for shortness of breath.    Cardiovascular:  Negative for chest pain and leg swelling.   Gastrointestinal:  Negative for nausea and vomiting.   Genitourinary:  Negative for dysuria.   Musculoskeletal:  positive for joint pain. Negative for falls.   Skin:  Negative for rash.   Neurological:  Negative for dizziness. Negative for sensory change.    Objective:   Orthopedic Physical Exam  LMP  (LMP Unknown)   General:  Well developed, well nourished female. AAOX3. No acute distress.   Breathing unlabored.  Mood and affect appropriate.     Examination RIGHT SHOULDER:   Inspection:   Surgical wound margins are well approximated and healing nicely.   No redness, warmth, drainage, or other signs of infection.    ROM:  PROM FF 75, ER 10.  AROM not tested today.  Strength:   Not tested today  Sensation: grossly intact distally  NV: Pulses  "palpable. Cap refill brisk    Assessment:       ICD-10-CM ICD-9-CM    1. S/P right rotator cuff repair  Z98.890 V45.89           Plan:   2wks s/p   RIGHT rotator cuff repair (x3 anchor) with subacromial decompression and AC joint resection    Wound Care: Sutures removed at the bedside. Regular wound care explained with soap and water.   Bracing: Continue UE sling during activities. Ok to remove for hygiene and ROM exercises   Abx: No evidence of wound infection  Activity: No heavy lifting, pushing, pulling, or gripping. Restrictions reviewed at bedside.  We did talk about shoulder range-of-motion goals for her next appointment.  Patient will work diligently on range of motion with formal PT  Pain Control: rest, ice, OTC analgesics. Rx Norco for breakthrough pain refilled today  PT: formal referral provided. [place] protocol.   Follow Up: 4 weeks          [1]   Current Outpatient Medications:     albuterol (PROVENTIL/VENTOLIN HFA) 90 mcg/actuation inhaler, Inhale 2 puffs into the lungs every 6 (six) hours as needed for Wheezing. Cough and Shortness of Breath. Rescue, Disp: 6.7 g, Rfl: 1    ALPRAZolam (XANAX) 0.5 MG tablet, Take one tablet by mouth 1 hour before procedure and bring other tablet with you to the procedure., Disp: 2 tablet, Rfl: 0    atorvastatin (LIPITOR) 10 MG tablet, , Disp: , Rfl:     BD ULTRA-FINE SHORT PEN NEEDLE 31 gauge x 5/16" Ndle, SMARTSIG:Pre-Filled Pen Syringe SUB-Q 4 Times Daily, Disp: , Rfl:     carvediloL (COREG) 12.5 MG tablet, Take 12.5 mg by mouth 2 (two) times daily., Disp: , Rfl:     fluticasone propionate (FLONASE) 50 mcg/actuation nasal spray, 2 sprays by Each Nostril route daily as needed., Disp: , Rfl:     FREESTYLE ELVER 2 SENSOR Kit, CHECK BLOOD SUGAR FOUR TIMES DAILY, Disp: , Rfl:     HYDROcodone-acetaminophen (NORCO) 7.5-325 mg per tablet, Take 1 tablet by mouth every 4 (four) hours as needed for Pain., Disp: 30 tablet, Rfl: 0    insulin aspart U-100 (NOVOLOG) 100 unit/mL (3 " mL) InPn pen, Inject 5 Units into the skin 3 (three) times daily with meals., Disp: , Rfl:     irbesartan-hydrochlorothiazide (AVALIDE) 300-12.5 mg per tablet, Take 1 tablet by mouth once daily., Disp: , Rfl:     JARDIANCE 25 mg tablet, Take 25 mg by mouth once daily., Disp: , Rfl:     LANTUS SOLOSTAR U-100 INSULIN glargine 100 units/mL (3mL) SubQ pen, Inject 30 Units into the skin 2 (two) times a day., Disp: , Rfl:     levothyroxine (SYNTHROID) 100 MCG tablet, Take 100 mcg by mouth before breakfast., Disp: , Rfl:     meclizine (ANTIVERT) 25 mg tablet, Take 1 tablet (25 mg total) by mouth 3 (three) times daily as needed for Dizziness., Disp: 20 tablet, Rfl: 0    MICRO THIN LANCETS 33 gauge Misc, , Disp: , Rfl:     MOUNJARO 7.5 mg/0.5 mL PnIj, SMARTSI.5 Milligram(s) SUB-Q Once a Week, Disp: , Rfl:     ONETOUCH VERIO Strp, , Disp: , Rfl:     OZEMPIC 1 mg/dose (4 mg/3 mL), Inject 1 mg into the skin every 7 days., Disp: , Rfl:     pantoprazole (PROTONIX) 20 MG tablet, , Disp: , Rfl:     pioglitazone (ACTOS) 30 MG tablet, Take 30 mg by mouth once daily., Disp: , Rfl:     triamcinolone acetonide 0.1% (KENALOG) 0.1 % cream, SMARTSIG:Topical 1 to 2 Times Daily PRN, Disp: , Rfl:     Current Facility-Administered Medications:     LIDOcaine HCL 10 mg/ml (1%) injection 1 mL, 1 mL, Other, 1 time in Clinic/HOD,     Facility-Administered Medications Ordered in Other Visits:     metoclopramide injection 10 mg, 10 mg, Intravenous, Once, Sarah Borja MD

## 2025-03-25 ENCOUNTER — OFFICE VISIT (OUTPATIENT)
Dept: ORTHOPEDICS | Facility: CLINIC | Age: 65
End: 2025-03-25
Payer: MEDICARE

## 2025-03-25 DIAGNOSIS — Z98.890 S/P RIGHT ROTATOR CUFF REPAIR: Primary | ICD-10-CM

## 2025-03-25 PROCEDURE — 99024 POSTOP FOLLOW-UP VISIT: CPT | Mod: S$GLB,,, | Performed by: PHYSICIAN ASSISTANT

## 2025-03-25 PROCEDURE — 1101F PT FALLS ASSESS-DOCD LE1/YR: CPT | Mod: CPTII,S$GLB,, | Performed by: PHYSICIAN ASSISTANT

## 2025-03-25 PROCEDURE — 99999 PR PBB SHADOW E&M-EST. PATIENT-LVL IV: CPT | Mod: PBBFAC,,, | Performed by: PHYSICIAN ASSISTANT

## 2025-03-25 PROCEDURE — 1125F AMNT PAIN NOTED PAIN PRSNT: CPT | Mod: CPTII,S$GLB,, | Performed by: PHYSICIAN ASSISTANT

## 2025-03-25 PROCEDURE — 1159F MED LIST DOCD IN RCRD: CPT | Mod: CPTII,S$GLB,, | Performed by: PHYSICIAN ASSISTANT

## 2025-03-25 PROCEDURE — 1160F RVW MEDS BY RX/DR IN RCRD: CPT | Mod: CPTII,S$GLB,, | Performed by: PHYSICIAN ASSISTANT

## 2025-03-25 PROCEDURE — 3288F FALL RISK ASSESSMENT DOCD: CPT | Mod: CPTII,S$GLB,, | Performed by: PHYSICIAN ASSISTANT

## 2025-03-25 PROCEDURE — 3051F HG A1C>EQUAL 7.0%<8.0%: CPT | Mod: CPTII,S$GLB,, | Performed by: PHYSICIAN ASSISTANT

## 2025-03-25 RX ORDER — HYDROCODONE BITARTRATE AND ACETAMINOPHEN 7.5; 325 MG/1; MG/1
1 TABLET ORAL EVERY 8 HOURS PRN
Qty: 21 TABLET | Refills: 0 | Status: SHIPPED | OUTPATIENT
Start: 2025-03-25 | End: 2025-04-01

## 2025-03-28 ENCOUNTER — CLINICAL SUPPORT (OUTPATIENT)
Dept: REHABILITATION | Facility: HOSPITAL | Age: 65
End: 2025-03-28
Payer: MEDICARE

## 2025-03-28 DIAGNOSIS — M25.611 DECREASED RIGHT SHOULDER RANGE OF MOTION: ICD-10-CM

## 2025-03-28 DIAGNOSIS — S46.011A TRAUMATIC COMPLETE TEAR OF RIGHT ROTATOR CUFF, INITIAL ENCOUNTER: Primary | ICD-10-CM

## 2025-03-28 DIAGNOSIS — M25.511 ACUTE PAIN OF RIGHT SHOULDER: ICD-10-CM

## 2025-03-28 DIAGNOSIS — Z98.890 S/P RIGHT ROTATOR CUFF REPAIR: ICD-10-CM

## 2025-03-28 PROCEDURE — 97140 MANUAL THERAPY 1/> REGIONS: CPT | Mod: PN

## 2025-03-28 PROCEDURE — 97161 PT EVAL LOW COMPLEX 20 MIN: CPT | Mod: PN

## 2025-04-01 NOTE — PROGRESS NOTES
Outpatient Rehab    Physical Therapy Evaluation    Patient Name: Emily Peter  MRN: [id number]  YOB: 1960  Encounter Date: 3/28/2025    Therapy Diagnosis:   Encounter Diagnoses   Name Primary?    S/P right rotator cuff repair     Traumatic complete tear of right rotator cuff, initial encounter Yes    Decreased right shoulder range of motion     Acute pain of right shoulder      Physician: Vickie Sheldon PA-C    Physician Orders: Eval and Treat  Medical Diagnosis: S/P right rotator cuff repair    Visit # / Visits Authorized:  1 / 1  Insurance Authorization Period: 3/25/2025 to 3/25/2026  Date of Evaluation: 3/28/2025  Plan of Care Certification: 3/28/2025 to 6/4/2025     Time In: 1105   Time Out: 1200  Total Time: 55   Total Billable Time:      Intake Outcome Measure for FOTO Survey    Therapist reviewed FOTO scores for Emily Peter on 3/28/2025.   FOTO report - see Media section or FOTO account episode details.     Intake Score:  %         Subjective   History of Present Illness  Emily is a 65 y.o. female who reports to physical therapy with a chief concern of Right RTC repair.     The patient reports a medical diagnosis of Z98.890 (ICD-10-CM) - S/P right rotator cuff repair.    Diagnostic tests related to this condition: X-ray.        Dominant Hand: Right  History of Present Condition/Illness: Patient reports that she was in an accident at work in 2012, which resulted in right shoulder pain for the last 10 years. Patient was in litigations from the accident, but she reports no longer going through any legal issues. Patient reports the pain level is about a 7/10 right now. She reports understanding precautions and protocol. Patient reports that she threw up following taking hydrocodone recently. She does have a codeine allergy. She plans to reach out to MD today in regards to this.     Pain     Patient reports a current pain level of 7/10. Pain at best is reported as 5/10. Pain at  worst is reported as 9/10.   Clinical Progression (since onset): Stable  Pain Qualities: Aching, Discomfort, Throbbing  Pain-Relieving Factors: Ice, Rest, Medications - prescription, Medications - over-the-counter           Past Medical History/Physical Systems Review:   Jaelyn Peter  has a past medical history of Diabetes mellitus, Hypertension, Lupus, and Thyroid disease.    Jaelyn Peter  has a past surgical history that includes Hernia repair; Appendectomy; Colonoscopy (N/A, 4/4/2024); Arthroscopic repair of rotator cuff of shoulder (Right, 3/11/2025); Arthroscopy of shoulder with decompression of subacromial space (Right, 3/11/2025); and Arthroscopic excision of acromioclavicular joint (Right, 3/11/2025).    Jaelny has a current medication list which includes the following prescription(s): albuterol, alprazolam, atorvastatin, bd ultra-fine short pen needle, carvedilol, fluticasone propionate, freestyle deneen 2 sensor, insulin aspart u-100, irbesartan-hydrochlorothiazide, jardiance, lantus solostar u-100 insulin, levothyroxine, meclizine, micro thin lancets, mounjaro, onetouch verio test strips, ozempic, pantoprazole, pioglitazone, and triamcinolone acetonide 0.1%, and the following Facility-Administered Medications: lidocaine hcl 10 mg/ml (1%) and metoclopramide.    Review of patient's allergies indicates:   Allergen Reactions    Codeine Shortness Of Breath     Patient can take Tramadol and oxycodone without complications        Objective      Shoulder Range of Motion  Right Shoulder   Active (deg) Passive (deg) Pain   Flexion   75     Extension         Scaption         ABduction         ADduction         Horizontal ABduction         Horizontal ADduction         External Rotation (Shoulder ABducted 0 degrees)         External Rotation (Shoulder ABducted 45 degrees)         External Rotation (Shoulder ABducted 90 degrees)         Internal Rotation (Shoulder ABducted 0 degrees)         Internal  Rotation (Shoulder ABducted 45 degrees)         Internal Rotation (Shoulder ABducted 90 degrees)           Left Shoulder   Active (deg) Passive (deg) Pain   Flexion 160       Extension         Scaption         ABduction 155       ADduction         Horizontal ABduction         Horizontal ADduction         External Rotation (Shoulder ABducted 0 degrees) 55       External Rotation (Shoulder ABducted 45 degrees)         External Rotation (Shoulder ABducted 90 degrees)         Internal Rotation (Shoulder ABducted 0 degrees)         Internal Rotation (Shoulder ABducted 45 degrees)         Internal Rotation (Shoulder ABducted 90 degrees)                          Treatment:  Manual Therapy  MT 1: Right shoulder PROM: flexion  MT 2: Right elbow flexion/extension PROM  Therapeutic Activity  TA 1: Patient education on plan of care, prognosis, and precautions    Time Entry(in minutes):  PT Evaluation (Low) Time Entry: 40  Manual Therapy Time Entry: 10  Therapeutic Activity Time Entry: 5    Assessment & Plan   Assessment  Emily presents with a condition of Low complexity.   Presentation of Symptoms: Stable       Functional Limitations: Carrying objects, Completing self-care activities, Completing work/school activities, Disrupted sleep pattern, Manipulating objects, Pain when reaching, Reaching, Range of motion, Performing household chores  Impairments: Abnormal or restricted range of motion, Activity intolerance, Impaired physical strength, Lack of appropriate home exercise program    Prognosis: Excellent  Assessment Details: Patient is a 65 year old female status post right shoulder arthroscopy with subacromial decompression, right shoulder arthroscopic rotator cuff repair using Opus suture anchors X 3, and right shoulder arthroscopic acromioclavicular joint resection on 3/11/2025. Currently following type II rotator cuff repair. No biceps tenodesis. Patient is doing well at this time and expressed understanding of current  precautions. Patient would benefit from skilled PT in order to regain right shoulder range of motion and strength.     Plan  From a physical therapy perspective, the patient would benefit from: Skilled Rehab Services    Planned therapy interventions include: Therapeutic exercise, Therapeutic activities, Neuromuscular re-education, and Manual therapy.            Visit Frequency: 2 times Per Week for 8 Weeks.       This plan was discussed with Patient.   Discussion participants: Agreed Upon Plan of Care             Patient's spiritual, cultural, and educational needs considered and patient agreeable to plan of care and goals.           Goals:   Active       Pain       Patient will report pain of 0/10 demonstrating a reduction of overall pain       Start:  04/04/25    Expected End:  05/30/25               Range of Motion       Patient will achieve right shoulder flexion of 160 degrees of PROM.       Start:  04/04/25    Expected End:  05/30/25            Patient will achieve right shoulder abduction of 150 degrees PROM       Start:  04/04/25    Expected End:  05/30/25            Patient will achieve right shoulder external rotation of 45 degrees PROM in neutral       Start:  04/04/25    Expected End:  05/30/25               Strength       Patient will achieve right shoulder flexion strength of 4/5       Start:  04/04/25    Expected End:  05/30/25            Patient will achieve right shoulder abduction strength of 4/5       Start:  04/04/25    Expected End:  05/30/25            Patient will achieve right shoulder external rotation strength of 4/5 in neutral       Start:  04/04/25    Expected End:  05/30/25                Romulo Betancourt, PT.

## 2025-04-04 ENCOUNTER — CLINICAL SUPPORT (OUTPATIENT)
Dept: REHABILITATION | Facility: HOSPITAL | Age: 65
End: 2025-04-04
Payer: MEDICARE

## 2025-04-04 DIAGNOSIS — M25.511 ACUTE PAIN OF RIGHT SHOULDER: ICD-10-CM

## 2025-04-04 DIAGNOSIS — S46.011A TRAUMATIC COMPLETE TEAR OF RIGHT ROTATOR CUFF, INITIAL ENCOUNTER: Primary | ICD-10-CM

## 2025-04-04 DIAGNOSIS — M25.611 DECREASED RIGHT SHOULDER RANGE OF MOTION: ICD-10-CM

## 2025-04-04 PROCEDURE — 97140 MANUAL THERAPY 1/> REGIONS: CPT | Mod: PN

## 2025-04-07 ENCOUNTER — CLINICAL SUPPORT (OUTPATIENT)
Dept: REHABILITATION | Facility: HOSPITAL | Age: 65
End: 2025-04-07
Payer: MEDICARE

## 2025-04-07 DIAGNOSIS — S46.011A TRAUMATIC COMPLETE TEAR OF RIGHT ROTATOR CUFF, INITIAL ENCOUNTER: Primary | ICD-10-CM

## 2025-04-07 DIAGNOSIS — M25.611 DECREASED RIGHT SHOULDER RANGE OF MOTION: ICD-10-CM

## 2025-04-07 DIAGNOSIS — M25.511 ACUTE PAIN OF RIGHT SHOULDER: ICD-10-CM

## 2025-04-07 PROCEDURE — 97530 THERAPEUTIC ACTIVITIES: CPT | Mod: PN

## 2025-04-07 PROCEDURE — 97140 MANUAL THERAPY 1/> REGIONS: CPT | Mod: PN

## 2025-04-10 NOTE — PROGRESS NOTES
"  Outpatient Rehab    Physical Therapy Visit    Patient Name: Emily Peter  MRN: [id number]  YOB: 1960  Encounter Date: 4/4/2025    Therapy Diagnosis:   Encounter Diagnoses   Name Primary?    Traumatic complete tear of right rotator cuff, initial encounter Yes    Decreased right shoulder range of motion     Acute pain of right shoulder      Physician: Vickie Sheldon PA-C    Physician Orders: Eval and Treat  Medical Diagnosis: S/P right rotator cuff repair     Visit # / Visits Authorized:  1 / 1  Insurance Authorization Period: 3/25/2025 to 3/25/2026  Date of Evaluation: 3/28/2025  Plan of Care Certification: 3/28/2025 to 6/4/2025      PT/PTA:     Number of PTA visits since last PT visit:   Time In: 1105   Time Out: 1200  Total Time: 55   Total Billable Time:      FOTO:  Intake Score:  %  Survey Score 1:  %  Survey Score 2:  %         Subjective   shoulder is doing well with pain at this time.  Pain reported as 3/10. right shoulder    Objective            Treatment:  Manual Therapy  MT 1: Right shoulder PROM: flexion  MT 2: Right elbow flexion/extension PROM  Therapeutic Activity  TA 1: Patient education on plan of care, prognosis, and precautions  TA 2: Scap squeezes: 5"x20  TA 3: Pendulums: 2'    Time Entry(in minutes):       Assessment & Plan   Assessment: Patient is a 65 year old female status post right shoulder arthroscopy with subacromial decompression, right shoulder arthroscopic rotator cuff repair using Opus suture anchors X 3, and right shoulder arthroscopic acromioclavicular joint resection on 3/11/2025. Currently following type II rotator cuff repair. No biceps tenodesis. Patient is doing well at this time and expressed understanding of current precautions. First visit following initial evaluation consisted primarily of manual therapy for passive shoulder range of motion.  Evaluation/Treatment Tolerance: Patient tolerated treatment well    Patient will continue to benefit from " skilled outpatient physical therapy to address the deficits listed in the problem list box on initial evaluation, provide pt/family education and to maximize pt's level of independence in the home and community environment.     Patient's spiritual, cultural, and educational needs considered and patient agreeable to plan of care and goals.           Plan:      Goals:   Active       Pain       Patient will report pain of 0/10 demonstrating a reduction of overall pain       Start:  04/04/25    Expected End:  05/30/25               Range of Motion       Patient will achieve right shoulder flexion of 160 degrees of PROM.       Start:  04/04/25    Expected End:  05/30/25            Patient will achieve right shoulder abduction of 150 degrees PROM       Start:  04/04/25    Expected End:  05/30/25            Patient will achieve right shoulder external rotation of 45 degrees PROM in neutral       Start:  04/04/25    Expected End:  05/30/25               Strength       Patient will achieve right shoulder flexion strength of 4/5       Start:  04/04/25    Expected End:  05/30/25            Patient will achieve right shoulder abduction strength of 4/5       Start:  04/04/25    Expected End:  05/30/25            Patient will achieve right shoulder external rotation strength of 4/5 in neutral       Start:  04/04/25    Expected End:  05/30/25                Romulo Betancourt, PT.

## 2025-04-10 NOTE — PROGRESS NOTES
"  Outpatient Rehab    Physical Therapy Visit    Patient Name: Emily Peter  MRN: [id number]  YOB: 1960  Encounter Date: 4/7/2025    Therapy Diagnosis:   Encounter Diagnoses   Name Primary?    Traumatic complete tear of right rotator cuff, initial encounter Yes    Decreased right shoulder range of motion     Acute pain of right shoulder        Physician: Vickie Sheldon PA-C    Physician Orders: Eval and Treat  Medical Diagnosis: S/P right rotator cuff repair     Visit # / Visits Authorized:  4/ 10  Insurance Authorization Period: 3/25/2025 to 3/25/2026  Date of Evaluation: 3/28/2025  Plan of Care Certification: 3/28/2025 to 6/4/2025      PT/PTA:     Number of PTA visits since last PT visit:   Time In: 1405   Time Out: 1500  Total Time: 55   Total Billable Time:      FOTO:  Intake Score:  %  Survey Score 1:  %  Survey Score 2:  %         Subjective   shoulder has been doing well..  Pain reported as 3/10. right shoulder    Objective            Treatment:  Manual Therapy  MT 1: Right shoulder PROM: flexion  MT 2: Right elbow flexion/extension PROM  Therapeutic Activity  TA 1: Supine chest press: 2x10 - 0#  TA 2: Supine shoulder ER AAROM: 5"x20  TA 3: Supine shoulder flexion AAROM: 2x10 - 0#  TA 4: Seated scap squeezes: 5"x20    Time Entry(in minutes):       Assessment & Plan   Assessment: Patient is a 65 year old female status post right shoulder arthroscopy with subacromial decompression, right shoulder arthroscopic rotator cuff repair using Opus suture anchors X 3, and right shoulder arthroscopic acromioclavicular joint resection on 3/11/2025. Currently following type II rotator cuff repair. No biceps tenodesis. Patient is doing well at this time and expressed understanding of current precautions. Continued with manual therapy for passive shoulder range of motion and introduction to AAROM shoulder exercises this visit.  Evaluation/Treatment Tolerance: Patient tolerated treatment well    Patient " will continue to benefit from skilled outpatient physical therapy to address the deficits listed in the problem list box on initial evaluation, provide pt/family education and to maximize pt's level of independence in the home and community environment.     Patient's spiritual, cultural, and educational needs considered and patient agreeable to plan of care and goals.           Plan:      Goals:   Active       Pain       Patient will report pain of 0/10 demonstrating a reduction of overall pain       Start:  04/04/25    Expected End:  05/30/25               Range of Motion       Patient will achieve right shoulder flexion of 160 degrees of PROM.       Start:  04/04/25    Expected End:  05/30/25            Patient will achieve right shoulder abduction of 150 degrees PROM       Start:  04/04/25    Expected End:  05/30/25            Patient will achieve right shoulder external rotation of 45 degrees PROM in neutral       Start:  04/04/25    Expected End:  05/30/25               Strength       Patient will achieve right shoulder flexion strength of 4/5       Start:  04/04/25    Expected End:  05/30/25            Patient will achieve right shoulder abduction strength of 4/5       Start:  04/04/25    Expected End:  05/30/25            Patient will achieve right shoulder external rotation strength of 4/5 in neutral       Start:  04/04/25    Expected End:  05/30/25                Romulo Betancourt, PT.

## 2025-04-15 ENCOUNTER — CLINICAL SUPPORT (OUTPATIENT)
Dept: REHABILITATION | Facility: HOSPITAL | Age: 65
End: 2025-04-15
Payer: MEDICARE

## 2025-04-15 DIAGNOSIS — M25.611 DECREASED RIGHT SHOULDER RANGE OF MOTION: ICD-10-CM

## 2025-04-15 DIAGNOSIS — S46.011A TRAUMATIC COMPLETE TEAR OF RIGHT ROTATOR CUFF, INITIAL ENCOUNTER: Primary | ICD-10-CM

## 2025-04-15 DIAGNOSIS — M25.511 ACUTE PAIN OF RIGHT SHOULDER: ICD-10-CM

## 2025-04-15 PROCEDURE — 97140 MANUAL THERAPY 1/> REGIONS: CPT | Mod: PN

## 2025-04-15 NOTE — PROGRESS NOTES
"  Outpatient Rehab    Physical Therapy Visit    Patient Name: Emily Peter  MRN: [id number]  YOB: 1960  Encounter Date: 4/15/2025    Therapy Diagnosis:   Encounter Diagnoses   Name Primary?    Traumatic complete tear of right rotator cuff, initial encounter Yes    Decreased right shoulder range of motion     Acute pain of right shoulder        Physician: Vickie Sheldon PA-C    Physician Orders: Eval and Treat  Medical Diagnosis: S/P right rotator cuff repair     Visit # / Visits Authorized:  5/ 10  Insurance Authorization Period: 3/25/2025 to 3/25/2026  Date of Evaluation: 3/28/2025  Plan of Care Certification: 3/28/2025 to 6/4/2025      PT/PTA:     Number of PTA visits since last PT visit:   Time In: 1405   Time Out: 1500  Total Time: 55   Total Billable Time:      FOTO:  Intake Score:  %  Survey Score 1:  %  Survey Score 2:  %         Subjective   her shoulder was doing fine after last visit. On Saturday, she was searching for her remote and lost her balance resulting in a fall. She tried using her right arm to push up from the ground, and she has had some increased pain since this instance..  Pain reported as 9/10. right shoulder    Objective            Treatment:  Manual Therapy  MT 1: Right shoulder PROM: flexion  MT 2: Right elbow flexion/extension PROM  MT 3: STM to right deltoid region  Balance/Neuromuscular Re-Education  NMR 1: Seated scap squeezes: 5"x20  NMR 2: Pendulums: 2'    Time Entry(in minutes):       Assessment & Plan   Assessment: Patient is a 65 year old female status post right shoulder arthroscopy with subacromial decompression, right shoulder arthroscopic rotator cuff repair using Opus suture anchors X 3, and right shoulder arthroscopic acromioclavicular joint resection on 3/11/2025. Currently following type II rotator cuff repair. No biceps tenodesis. Patient is doing well at this time and expressed understanding of current precautions. Patient presented this visit " with reports of exeperiencing a fall with increased pain. Held off on therex due to subjective report and planning to monitor pain level at next visit.  Evaluation/Treatment Tolerance: Patient tolerated treatment well    Patient will continue to benefit from skilled outpatient physical therapy to address the deficits listed in the problem list box on initial evaluation, provide pt/family education and to maximize pt's level of independence in the home and community environment.     Patient's spiritual, cultural, and educational needs considered and patient agreeable to plan of care and goals.           Plan:      Goals:   Active       Pain       Patient will report pain of 0/10 demonstrating a reduction of overall pain       Start:  04/04/25    Expected End:  05/30/25               Range of Motion       Patient will achieve right shoulder flexion of 160 degrees of PROM.       Start:  04/04/25    Expected End:  05/30/25            Patient will achieve right shoulder abduction of 150 degrees PROM       Start:  04/04/25    Expected End:  05/30/25            Patient will achieve right shoulder external rotation of 45 degrees PROM in neutral       Start:  04/04/25    Expected End:  05/30/25               Strength       Patient will achieve right shoulder flexion strength of 4/5       Start:  04/04/25    Expected End:  05/30/25            Patient will achieve right shoulder abduction strength of 4/5       Start:  04/04/25    Expected End:  05/30/25            Patient will achieve right shoulder external rotation strength of 4/5 in neutral       Start:  04/04/25    Expected End:  05/30/25                Romulo Betancourt, PT.  .

## 2025-04-22 ENCOUNTER — CLINICAL SUPPORT (OUTPATIENT)
Dept: REHABILITATION | Facility: HOSPITAL | Age: 65
End: 2025-04-22
Payer: MEDICARE

## 2025-04-22 DIAGNOSIS — M25.511 ACUTE PAIN OF RIGHT SHOULDER: ICD-10-CM

## 2025-04-22 DIAGNOSIS — M25.611 DECREASED RIGHT SHOULDER RANGE OF MOTION: ICD-10-CM

## 2025-04-22 DIAGNOSIS — S46.011A TRAUMATIC COMPLETE TEAR OF RIGHT ROTATOR CUFF, INITIAL ENCOUNTER: Primary | ICD-10-CM

## 2025-04-22 PROCEDURE — 97530 THERAPEUTIC ACTIVITIES: CPT | Mod: PN

## 2025-04-22 PROCEDURE — 97140 MANUAL THERAPY 1/> REGIONS: CPT | Mod: PN

## 2025-04-28 NOTE — PROGRESS NOTES
Subjective:     Patient ID: Jaelyn Peter is a 65 y.o. female.    Chief Complaint: Postop Follow Up    HPI:   Jaelyn Peter  returns for a postop visit approximately 6 weeks following:    Surgery: RIGHT rotator cuff repair (x3 anchor) with subacromial decompression and AC joint resection  Date of Surgery:  3-  Surgeon: Dr. Bennett    The patient reports a great improvement in pain and shoulder mobility.  She is attending formal physical therapy 1-2 times a week and working on passive range of motion exercises.  She reports compliance with upper extremity sling.  Patient no longer requiring prescribed analgesia.    Past Medical History:   Diagnosis Date    Diabetes mellitus     Hypertension     Lupus     Thyroid disease      Current Medications[1]  Review of patient's allergies indicates:   Allergen Reactions    Codeine Shortness Of Breath     Patient can take Tramadol and oxycodone without complications       Review of Systems   Constitutional:  Negative for chills and fever.   Respiratory:  Negative for shortness of breath.    Cardiovascular:  Negative for chest pain and leg swelling.   Gastrointestinal:  Negative for nausea and vomiting.   Genitourinary:  Negative for dysuria.   Musculoskeletal:  positive for joint pain. Negative for falls.   Skin:  Negative for rash.   Neurological:  Negative for dizziness. Negative for sensory change.    Objective:   Orthopedic Physical Exam  LMP  (LMP Unknown)   General:  Well developed, well nourished female. AAOX3. No acute distress.   Breathing unlabored.  Mood and affect appropriate.     Examination RIGHT SHOULDER:   Inspection:   Incisions are well healed with appropriate scar formation   ROM:  PROM , ER 25 (Improved).  AROM not tested today.  Strength:   Not tested today  Sensation: grossly intact distally  NV: Pulses palpable. Cap refill brisk    Assessment:       ICD-10-CM ICD-9-CM    1. S/P right rotator cuff repair  Z98.890 V45.89            "  Plan:   6wks s/p   RIGHT rotator cuff repair (x3 anchor) with subacromial decompression and AC joint resection    Bracing:  Okay to discontinue upper extremity sling  Activity:  Range of motion is greatly improved today.  We discussed continue ROM work.  No strengthening for 10-12 weeks postop  Pain Control: rest, ice, OTC analgesics.  PT:  Continued guided therapy recommended.  Okay to advance to active range of motion exercises.  No strengthening program until 12 weeks postop  Follow Up: 6 weeks            [1]   Current Outpatient Medications:     albuterol (PROVENTIL/VENTOLIN HFA) 90 mcg/actuation inhaler, Inhale 2 puffs into the lungs every 6 (six) hours as needed for Wheezing. Cough and Shortness of Breath. Rescue, Disp: 6.7 g, Rfl: 1    ALPRAZolam (XANAX) 0.5 MG tablet, Take one tablet by mouth 1 hour before procedure and bring other tablet with you to the procedure., Disp: 2 tablet, Rfl: 0    atorvastatin (LIPITOR) 10 MG tablet, , Disp: , Rfl:     BD ULTRA-FINE SHORT PEN NEEDLE 31 gauge x 5/16" Ndle, SMARTSIG:Pre-Filled Pen Syringe SUB-Q 4 Times Daily, Disp: , Rfl:     carvediloL (COREG) 12.5 MG tablet, Take 12.5 mg by mouth 2 (two) times daily., Disp: , Rfl:     fluticasone propionate (FLONASE) 50 mcg/actuation nasal spray, 2 sprays by Each Nostril route daily as needed., Disp: , Rfl:     FREESTYLE ELVER 2 SENSOR Kit, CHECK BLOOD SUGAR FOUR TIMES DAILY, Disp: , Rfl:     insulin aspart U-100 (NOVOLOG) 100 unit/mL (3 mL) InPn pen, Inject 5 Units into the skin 3 (three) times daily with meals., Disp: , Rfl:     irbesartan-hydrochlorothiazide (AVALIDE) 300-12.5 mg per tablet, Take 1 tablet by mouth once daily., Disp: , Rfl:     JARDIANCE 25 mg tablet, Take 25 mg by mouth once daily., Disp: , Rfl:     LANTUS SOLOSTAR U-100 INSULIN glargine 100 units/mL (3mL) SubQ pen, Inject 30 Units into the skin 2 (two) times a day., Disp: , Rfl:     levothyroxine (SYNTHROID) 100 MCG tablet, Take 100 mcg by mouth before " breakfast., Disp: , Rfl:     meclizine (ANTIVERT) 25 mg tablet, Take 1 tablet (25 mg total) by mouth 3 (three) times daily as needed for Dizziness., Disp: 20 tablet, Rfl: 0    MICRO THIN LANCETS 33 gauge Misc, , Disp: , Rfl:     MOUNJARO 7.5 mg/0.5 mL PnIj, SMARTSI.5 Milligram(s) SUB-Q Once a Week, Disp: , Rfl:     ONETOUCH VERIO Strp, , Disp: , Rfl:     OZEMPIC 1 mg/dose (4 mg/3 mL), Inject 1 mg into the skin every 7 days., Disp: , Rfl:     pantoprazole (PROTONIX) 20 MG tablet, , Disp: , Rfl:     pioglitazone (ACTOS) 30 MG tablet, Take 30 mg by mouth once daily., Disp: , Rfl:     triamcinolone acetonide 0.1% (KENALOG) 0.1 % cream, SMARTSIG:Topical 1 to 2 Times Daily PRN, Disp: , Rfl:     Current Facility-Administered Medications:     LIDOcaine HCL 10 mg/ml (1%) injection 1 mL, 1 mL, Other, 1 time in Clinic/HOD,     Facility-Administered Medications Ordered in Other Visits:     metoclopramide injection 10 mg, 10 mg, Intravenous, Once, Sarah Borja MD

## 2025-04-29 ENCOUNTER — OFFICE VISIT (OUTPATIENT)
Dept: ORTHOPEDICS | Facility: CLINIC | Age: 65
End: 2025-04-29
Payer: MEDICARE

## 2025-04-29 DIAGNOSIS — Z98.890 S/P RIGHT ROTATOR CUFF REPAIR: Primary | ICD-10-CM

## 2025-04-29 PROCEDURE — 99999 PR PBB SHADOW E&M-EST. PATIENT-LVL III: CPT | Mod: PBBFAC,,, | Performed by: PHYSICIAN ASSISTANT

## 2025-04-29 PROCEDURE — 1101F PT FALLS ASSESS-DOCD LE1/YR: CPT | Mod: CPTII,S$GLB,, | Performed by: PHYSICIAN ASSISTANT

## 2025-04-29 PROCEDURE — 3288F FALL RISK ASSESSMENT DOCD: CPT | Mod: CPTII,S$GLB,, | Performed by: PHYSICIAN ASSISTANT

## 2025-04-29 PROCEDURE — 1160F RVW MEDS BY RX/DR IN RCRD: CPT | Mod: CPTII,S$GLB,, | Performed by: PHYSICIAN ASSISTANT

## 2025-04-29 PROCEDURE — 1125F AMNT PAIN NOTED PAIN PRSNT: CPT | Mod: CPTII,S$GLB,, | Performed by: PHYSICIAN ASSISTANT

## 2025-04-29 PROCEDURE — 3051F HG A1C>EQUAL 7.0%<8.0%: CPT | Mod: CPTII,S$GLB,, | Performed by: PHYSICIAN ASSISTANT

## 2025-04-29 PROCEDURE — 99024 POSTOP FOLLOW-UP VISIT: CPT | Mod: S$GLB,,, | Performed by: PHYSICIAN ASSISTANT

## 2025-04-29 PROCEDURE — 1159F MED LIST DOCD IN RCRD: CPT | Mod: CPTII,S$GLB,, | Performed by: PHYSICIAN ASSISTANT

## 2025-04-29 NOTE — LETTER
April 29, 2025    Jaelyn Peter  [address] [place] [address]             [facility]  [address]  [address]  [place] [address]  Phone: [phone] To Whom it May Concern,    Ms. Peter underwent right shoulder rotator cuff repair surgery with Dr. Bennett at [facility] on 3-. She is still under our care postoperatively and attending formal physical therapy 1-2x per week.    If you have any questions or concerns, please don't hesitate to call.    Sincerely,        Vickie Sheldon PA-C

## 2025-05-06 ENCOUNTER — CLINICAL SUPPORT (OUTPATIENT)
Dept: REHABILITATION | Facility: HOSPITAL | Age: 65
End: 2025-05-06
Payer: MEDICARE

## 2025-05-06 DIAGNOSIS — M25.611 DECREASED RIGHT SHOULDER RANGE OF MOTION: ICD-10-CM

## 2025-05-06 DIAGNOSIS — S46.011A TRAUMATIC COMPLETE TEAR OF RIGHT ROTATOR CUFF, INITIAL ENCOUNTER: Primary | ICD-10-CM

## 2025-05-06 DIAGNOSIS — M25.511 ACUTE PAIN OF RIGHT SHOULDER: ICD-10-CM

## 2025-05-06 PROCEDURE — 97530 THERAPEUTIC ACTIVITIES: CPT | Mod: PN

## 2025-05-07 NOTE — PROGRESS NOTES
"  Outpatient Rehab    Physical Therapy Visit    Patient Name: Emily Peter  MRN: [id number]  YOB: 1960  Encounter Date: 4/22/2025    Therapy Diagnosis:   Encounter Diagnoses   Name Primary?    Traumatic complete tear of right rotator cuff, initial encounter Yes    Decreased right shoulder range of motion     Acute pain of right shoulder          Physician: Vickie Sheldon PA-C    Physician Orders: Eval and Treat  Medical Diagnosis: S/P right rotator cuff repair     Visit # / Visits Authorized:  5/ 10  Insurance Authorization Period: 3/25/2025 to 3/25/2026  Date of Evaluation: 3/28/2025  Plan of Care Certification: 3/28/2025 to 6/4/2025      PT/PTA:     Number of PTA visits since last PT visit:   Time In:   1705  Time Out:  1800  Total Time:     Total Billable Time:      FOTO:  Intake Score:  %  Survey Score 1:  %  Survey Score 2:  %         Subjective    Shoulder is feeling a bit better from recent fall she experienced.          Objective            Treatment:  Manual Therapy  MT 1: Right shoulder PROM: flexion  MT 2: Right elbow flexion/extension PROM  MT 3: STM to right deltoid region  Therapeutic Activity  TA 1: Patient education on plan of care, prognosis, and precautions  TA 2: Scap squeezes: 5"x20  TA 3: Pendulums: 2'  TA 4: Chest press AAROM: 3x10 - 0#  TA 5: Shoulder flexion AAROM: 3x10 - 0#  TA 6: Shoulder ER AAROM: 5"x20    Time Entry(in minutes):       Assessment & Plan   Assessment: Patient is a 65 year old female status post right shoulder arthroscopy with subacromial decompression, right shoulder arthroscopic rotator cuff repair using Opus suture anchors X 3, and right shoulder arthroscopic acromioclavicular joint resection on 3/11/2025. Currently following type II rotator cuff repair. No biceps tenodesis. Patient presented with reports of pain improving since recent fall.  Evaluation/Treatment Tolerance: Patient tolerated treatment well    Patient will continue to benefit from " skilled outpatient physical therapy to address the deficits listed in the problem list box on initial evaluation, provide pt/family education and to maximize pt's level of independence in the home and community environment.     Patient's spiritual, cultural, and educational needs considered and patient agreeable to plan of care and goals.           Plan:      Goals:   Active       Pain       Patient will report pain of 0/10 demonstrating a reduction of overall pain       Start:  04/04/25    Expected End:  05/30/25               Range of Motion       Patient will achieve right shoulder flexion of 160 degrees of PROM.       Start:  04/04/25    Expected End:  05/30/25            Patient will achieve right shoulder abduction of 150 degrees PROM       Start:  04/04/25    Expected End:  05/30/25            Patient will achieve right shoulder external rotation of 45 degrees PROM in neutral       Start:  04/04/25    Expected End:  05/30/25               Strength       Patient will achieve right shoulder flexion strength of 4/5       Start:  04/04/25    Expected End:  05/30/25            Patient will achieve right shoulder abduction strength of 4/5       Start:  04/04/25    Expected End:  05/30/25            Patient will achieve right shoulder external rotation strength of 4/5 in neutral       Start:  04/04/25    Expected End:  05/30/25                Romulo Betancourt, PT.  .

## 2025-05-12 ENCOUNTER — CLINICAL SUPPORT (OUTPATIENT)
Dept: REHABILITATION | Facility: HOSPITAL | Age: 65
End: 2025-05-12
Payer: MEDICARE

## 2025-05-12 DIAGNOSIS — S46.011A TRAUMATIC COMPLETE TEAR OF RIGHT ROTATOR CUFF, INITIAL ENCOUNTER: Primary | ICD-10-CM

## 2025-05-12 DIAGNOSIS — M25.611 DECREASED RIGHT SHOULDER RANGE OF MOTION: ICD-10-CM

## 2025-05-12 DIAGNOSIS — M25.511 ACUTE PAIN OF RIGHT SHOULDER: ICD-10-CM

## 2025-05-12 PROCEDURE — 97530 THERAPEUTIC ACTIVITIES: CPT | Mod: PN

## 2025-05-20 ENCOUNTER — HOSPITAL ENCOUNTER (OUTPATIENT)
Dept: RADIOLOGY | Facility: HOSPITAL | Age: 65
Discharge: HOME | End: 2025-05-20
Attending: FAMILY MEDICINE
Payer: MEDICARE

## 2025-05-20 ENCOUNTER — CLINICAL SUPPORT (OUTPATIENT)
Dept: REHABILITATION | Facility: HOSPITAL | Age: 65
End: 2025-05-20
Payer: MEDICARE

## 2025-05-20 DIAGNOSIS — S46.011A TRAUMATIC COMPLETE TEAR OF RIGHT ROTATOR CUFF, INITIAL ENCOUNTER: Primary | ICD-10-CM

## 2025-05-20 DIAGNOSIS — M25.611 DECREASED RIGHT SHOULDER RANGE OF MOTION: ICD-10-CM

## 2025-05-20 DIAGNOSIS — M25.511 ACUTE PAIN OF RIGHT SHOULDER: ICD-10-CM

## 2025-05-20 DIAGNOSIS — Z12.31 VISIT FOR SCREENING MAMMOGRAM: ICD-10-CM

## 2025-05-20 PROCEDURE — 77067 SCR MAMMO BI INCL CAD: CPT | Mod: 26,,, | Performed by: RADIOLOGY

## 2025-05-20 PROCEDURE — 97530 THERAPEUTIC ACTIVITIES: CPT | Mod: PN

## 2025-05-20 PROCEDURE — 77063 BREAST TOMOSYNTHESIS BI: CPT | Mod: TC

## 2025-05-20 PROCEDURE — 77063 BREAST TOMOSYNTHESIS BI: CPT | Mod: 26,,, | Performed by: RADIOLOGY

## 2025-05-22 ENCOUNTER — CLINICAL SUPPORT (OUTPATIENT)
Dept: REHABILITATION | Facility: HOSPITAL | Age: 65
End: 2025-05-22
Payer: MEDICARE

## 2025-05-22 DIAGNOSIS — M25.611 DECREASED RIGHT SHOULDER RANGE OF MOTION: ICD-10-CM

## 2025-05-22 DIAGNOSIS — S46.011A TRAUMATIC COMPLETE TEAR OF RIGHT ROTATOR CUFF, INITIAL ENCOUNTER: Primary | ICD-10-CM

## 2025-05-22 DIAGNOSIS — M25.511 ACUTE PAIN OF RIGHT SHOULDER: ICD-10-CM

## 2025-05-22 PROCEDURE — 97530 THERAPEUTIC ACTIVITIES: CPT | Mod: PN

## 2025-05-22 NOTE — PROGRESS NOTES
Outpatient Rehab    Physical Therapy Visit    Patient Name: Emily Peter  MRN: [id number]  YOB: 1960  Encounter Date: 5/6/2025    Therapy Diagnosis:   Encounter Diagnoses   Name Primary?    Traumatic complete tear of right rotator cuff, initial encounter Yes    Decreased right shoulder range of motion     Acute pain of right shoulder        Physician: Vickie Sheldon PA-C    Physician Orders: Eval and Treat  Medical Diagnosis: S/P right rotator cuff repair     Visit # / Visits Authorized:  5/ 10  Insurance Authorization Period: 3/25/2025 to 3/25/2026  Date of Evaluation: 3/28/2025  Plan of Care Certification: 3/28/2025 to 6/4/2025      PT/PTA:     Number of PTA visits since last PT visit:   Time In:   1505  Time Out:  1600  Total Time:     Total Billable Time:      FOTO:  Intake Score:  %  Survey Score 1:  %  Survey Score 2:  %         Subjective    Shoulder is feeling a bit better from recent fall she experienced.          Objective            Treatment:    Therapeutic Activity  Chest press AAROM: 3x10 - 0#  Shoulder flexion AAROM: 3x10 - 0#  Side lying shoulder external rotation: 3x10 - 1# - right   Side lying shoulder abduction: 3x10 - 0# - right   Standing shoulder extension: 3x10 - red theraband   Standing shoulder internal rotation: 3x10 - green theraband   Standing straight arm pull downs: 3x10 - green theraband   Standing scaption: 3x10 - 0#  Upper body ergometer: 3/3 - L2    Time Entry(in minutes):       Assessment & Plan   Assessment: Patient is a 65 year old female status post right shoulder arthroscopy with subacromial decompression, right shoulder arthroscopic rotator cuff repair using Opus suture anchors X 3, and right shoulder arthroscopic acromioclavicular joint resection on 3/11/2025. Currently following type II rotator cuff repair. No biceps tenodesis. Patient is progressing well with AAROM, AROM, and resistance strengthening.  Evaluation/Treatment Tolerance: Patient  tolerated treatment well    Patient will continue to benefit from skilled outpatient physical therapy to address the deficits listed in the problem list box on initial evaluation, provide pt/family education and to maximize pt's level of independence in the home and community environment.     Patient's spiritual, cultural, and educational needs considered and patient agreeable to plan of care and goals.           Plan:      Goals:   Active       Pain       Patient will report pain of 0/10 demonstrating a reduction of overall pain       Start:  04/04/25    Expected End:  05/30/25               Range of Motion       Patient will achieve right shoulder flexion of 160 degrees of PROM.       Start:  04/04/25    Expected End:  05/30/25            Patient will achieve right shoulder abduction of 150 degrees PROM       Start:  04/04/25    Expected End:  05/30/25            Patient will achieve right shoulder external rotation of 45 degrees PROM in neutral       Start:  04/04/25    Expected End:  05/30/25               Strength       Patient will achieve right shoulder flexion strength of 4/5       Start:  04/04/25    Expected End:  05/30/25            Patient will achieve right shoulder abduction strength of 4/5       Start:  04/04/25    Expected End:  05/30/25            Patient will achieve right shoulder external rotation strength of 4/5 in neutral       Start:  04/04/25    Expected End:  05/30/25                Romulo Betancourt, PT.  .

## 2025-05-26 ENCOUNTER — CLINICAL SUPPORT (OUTPATIENT)
Dept: REHABILITATION | Facility: HOSPITAL | Age: 65
End: 2025-05-26
Payer: MEDICARE

## 2025-05-26 DIAGNOSIS — S46.011A TRAUMATIC COMPLETE TEAR OF RIGHT ROTATOR CUFF, INITIAL ENCOUNTER: Primary | ICD-10-CM

## 2025-05-26 DIAGNOSIS — M25.611 DECREASED RIGHT SHOULDER RANGE OF MOTION: ICD-10-CM

## 2025-05-26 DIAGNOSIS — M25.511 ACUTE PAIN OF RIGHT SHOULDER: ICD-10-CM

## 2025-05-26 PROCEDURE — 97530 THERAPEUTIC ACTIVITIES: CPT | Mod: PN

## 2025-06-06 ENCOUNTER — TELEPHONE (OUTPATIENT)
Dept: REHABILITATION | Facility: HOSPITAL | Age: 65
End: 2025-06-06

## 2025-06-11 ENCOUNTER — OFFICE VISIT (OUTPATIENT)
Dept: ORTHOPEDICS | Facility: CLINIC | Age: 65
End: 2025-06-11
Payer: MEDICARE

## 2025-06-11 DIAGNOSIS — Z98.890 S/P RIGHT ROTATOR CUFF REPAIR: Primary | ICD-10-CM

## 2025-06-11 PROCEDURE — 99999 PR PBB SHADOW E&M-EST. PATIENT-LVL III: CPT | Mod: PBBFAC,,, | Performed by: PHYSICIAN ASSISTANT

## 2025-06-11 NOTE — PROGRESS NOTES
Subjective:     Patient ID: Jaelyn Peter is a 65 y.o. female.    Chief Complaint: Postop Follow Up    HPI:   Jaelyn Peter  returns for a postop visit approximately 3 months following:    Surgery: RIGHT rotator cuff repair (x3 anchor) with subacromial decompression and AC joint resection  Date of Surgery:  3-  Surgeon: Dr. Bennett    Patient reportedly is doing much better with improved range of motion.  She has started some light strengthening, but continues formal physical therapy strengthening program.  No new injuries.  No ongoing complaints.    Past Medical History:   Diagnosis Date    Diabetes mellitus     Hypertension     Lupus     Thyroid disease      Current Medications[1]  Review of patient's allergies indicates:   Allergen Reactions    Codeine Shortness Of Breath     Patient can take Tramadol and oxycodone without complications     Review of Systems   Constitutional:  Negative for chills and fever.   Respiratory:  Negative for shortness of breath.    Cardiovascular:  Negative for chest pain and leg swelling.   Gastrointestinal:  Negative for nausea and vomiting.   Genitourinary:  Negative for dysuria.   Musculoskeletal:  positive for joint pain. Negative for falls.   Skin:  Negative for rash.   Neurological:  Negative for dizziness. Negative for sensory change.    Objective:   Orthopedic Physical Exam  LMP  (LMP Unknown)   General:  Well developed, well nourished female. AAOX3. No acute distress.   Breathing unlabored.  Mood and affect appropriate.     Examination RIGHT SHOULDER:   Inspection:   Incisions are well healed with appropriate scar formation   ROM:  PROM , ER 50  AROM equivalent  Strength:   Not tested today  Sensation: grossly intact distally  NV: Pulses palpable. Cap refill brisk    Assessment:       ICD-10-CM ICD-9-CM    1. S/P right rotator cuff repair  Z98.890 V45.89         Plan:   3 months s/p  RIGHT rotator cuff repair (x3 anchor) with subacromial  "decompression and AC joint resection    Activity:  Okay to further strengthening program and advance activities as tolerated.  PT:  I recommend continuing formal PT until she has reached maximum medical improvement as it pertains to right shoulder strength  Follow Up: 2 months         [1]   Current Outpatient Medications:     albuterol (PROVENTIL/VENTOLIN HFA) 90 mcg/actuation inhaler, Inhale 2 puffs into the lungs every 6 (six) hours as needed for Wheezing. Cough and Shortness of Breath. Rescue, Disp: 6.7 g, Rfl: 1    ALPRAZolam (XANAX) 0.5 MG tablet, Take one tablet by mouth 1 hour before procedure and bring other tablet with you to the procedure., Disp: 2 tablet, Rfl: 0    atorvastatin (LIPITOR) 10 MG tablet, , Disp: , Rfl:     BD ULTRA-FINE SHORT PEN NEEDLE 31 gauge x 5/16" Ndle, SMARTSIG:Pre-Filled Pen Syringe SUB-Q 4 Times Daily, Disp: , Rfl:     carvediloL (COREG) 12.5 MG tablet, Take 12.5 mg by mouth 2 (two) times daily., Disp: , Rfl:     fluticasone propionate (FLONASE) 50 mcg/actuation nasal spray, 2 sprays by Each Nostril route daily as needed., Disp: , Rfl:     FREESTYLE ELVER 2 SENSOR Kit, CHECK BLOOD SUGAR FOUR TIMES DAILY, Disp: , Rfl:     insulin aspart U-100 (NOVOLOG) 100 unit/mL (3 mL) InPn pen, Inject 5 Units into the skin 3 (three) times daily with meals., Disp: , Rfl:     irbesartan-hydrochlorothiazide (AVALIDE) 300-12.5 mg per tablet, Take 1 tablet by mouth once daily., Disp: , Rfl:     JARDIANCE 25 mg tablet, Take 25 mg by mouth once daily., Disp: , Rfl:     LANTUS SOLOSTAR U-100 INSULIN glargine 100 units/mL (3mL) SubQ pen, Inject 30 Units into the skin 2 (two) times a day., Disp: , Rfl:     levothyroxine (SYNTHROID) 100 MCG tablet, Take 100 mcg by mouth before breakfast., Disp: , Rfl:     meclizine (ANTIVERT) 25 mg tablet, Take 1 tablet (25 mg total) by mouth 3 (three) times daily as needed for Dizziness., Disp: 20 tablet, Rfl: 0    MICRO THIN LANCETS 33 gauge Misc, , Disp: , Rfl:     " MOUNJARO 7.5 mg/0.5 mL PnIj, SMARTSI.5 Milligram(s) SUB-Q Once a Week, Disp: , Rfl:     ONETOUCH VERIO Strp, , Disp: , Rfl:     OZEMPIC 1 mg/dose (4 mg/3 mL), Inject 1 mg into the skin every 7 days., Disp: , Rfl:     pantoprazole (PROTONIX) 20 MG tablet, , Disp: , Rfl:     pioglitazone (ACTOS) 30 MG tablet, Take 30 mg by mouth once daily., Disp: , Rfl:     triamcinolone acetonide 0.1% (KENALOG) 0.1 % cream, SMARTSIG:Topical 1 to 2 Times Daily PRN, Disp: , Rfl:     Current Facility-Administered Medications:     LIDOcaine HCL 10 mg/ml (1%) injection 1 mL, 1 mL, Other, 1 time in Clinic/HOD,     Facility-Administered Medications Ordered in Other Visits:     metoclopramide injection 10 mg, 10 mg, Intravenous, Once, Sarah Borja MD

## 2025-06-17 ENCOUNTER — OFFICE VISIT (OUTPATIENT)
Dept: DERMATOLOGY | Facility: CLINIC | Age: 65
End: 2025-06-17
Payer: MEDICARE

## 2025-06-17 DIAGNOSIS — L98.9 DERMATOSIS: Primary | ICD-10-CM

## 2025-06-17 PROCEDURE — 99999 PR PBB SHADOW E&M-EST. PATIENT-LVL IV: CPT | Mod: PBBFAC,,, | Performed by: PHYSICIAN ASSISTANT

## 2025-06-17 PROCEDURE — 11104 PUNCH BX SKIN SINGLE LESION: CPT | Mod: S$GLB,,, | Performed by: PHYSICIAN ASSISTANT

## 2025-06-17 PROCEDURE — 1126F AMNT PAIN NOTED NONE PRSNT: CPT | Mod: CPTII,S$GLB,, | Performed by: PHYSICIAN ASSISTANT

## 2025-06-17 PROCEDURE — 99204 OFFICE O/P NEW MOD 45 MIN: CPT | Mod: 25,S$GLB,, | Performed by: PHYSICIAN ASSISTANT

## 2025-06-17 PROCEDURE — 3051F HG A1C>EQUAL 7.0%<8.0%: CPT | Mod: CPTII,S$GLB,, | Performed by: PHYSICIAN ASSISTANT

## 2025-06-17 PROCEDURE — 1159F MED LIST DOCD IN RCRD: CPT | Mod: CPTII,S$GLB,, | Performed by: PHYSICIAN ASSISTANT

## 2025-06-17 PROCEDURE — 1101F PT FALLS ASSESS-DOCD LE1/YR: CPT | Mod: CPTII,S$GLB,, | Performed by: PHYSICIAN ASSISTANT

## 2025-06-17 PROCEDURE — 1160F RVW MEDS BY RX/DR IN RCRD: CPT | Mod: CPTII,S$GLB,, | Performed by: PHYSICIAN ASSISTANT

## 2025-06-17 PROCEDURE — 3288F FALL RISK ASSESSMENT DOCD: CPT | Mod: CPTII,S$GLB,, | Performed by: PHYSICIAN ASSISTANT

## 2025-06-17 RX ORDER — TRIAMCINOLONE ACETONIDE 1 MG/G
OINTMENT TOPICAL
Qty: 30 G | Refills: 1 | Status: SHIPPED | OUTPATIENT
Start: 2025-06-17

## 2025-06-17 NOTE — PATIENT INSTRUCTIONS

## 2025-06-17 NOTE — PROGRESS NOTES
Subjective:      Patient ID:  Jaelyn Peter is a 65 y.o. female who presents for   Chief Complaint   Patient presents with    Dry Skin     chin     Pt is present for dry skin. Hx of thyroid disease and vitiligo of hands. Has seen Edwardo in the past. Has opzelura for hands but hasn't seen improvements with it.    Patient with new area of concern:   Location: chin  Duration: 2 yrs  S/S: flaking, peeling & itching; uses tweezers to remove peeling skin   Previous treatments: triamcinolone 0.5%, Aquaphor, clotrimazole, clobetasol & nystatin         Review of Systems   Skin:  Positive for itching and dry skin. Negative for rash.       Objective:   Physical Exam   Constitutional: She appears well-developed and well-nourished. No distress.   Neurological: She is alert and oriented to person, place, and time. She is not disoriented.   Psychiatric: She has a normal mood and affect.   Skin:   Areas Examined (abnormalities noted in diagram):   Head / Face Inspection Performed  Neck Inspection Performed  LUE Inspection Performed  Nails and Digits Inspection Performed                         Diagram Legend     Erythematous scaling macule/papule c/w actinic keratosis       Vascular papule c/w angioma      Pigmented verrucoid papule/plaque c/w seborrheic keratosis      Yellow umbilicated papule c/w sebaceous hyperplasia      Irregularly shaped tan macule c/w lentigo     1-2 mm smooth white papules consistent with Milia      Movable subcutaneous cyst with punctum c/w epidermal inclusion cyst      Subcutaneous movable cyst c/w pilar cyst      Firm pink to brown papule c/w dermatofibroma      Pedunculated fleshy papule(s) c/w skin tag(s)      Evenly pigmented macule c/w junctional nevus     Mildly variegated pigmented, slightly irregular-bordered macule c/w mildly atypical nevus      Flesh colored to evenly pigmented papule c/w intradermal nevus       Pink pearly papule/plaque c/w basal cell carcinoma      Erythematous  hyperkeratotic cursted plaque c/w SCC      Surgical scar with no sign of skin cancer recurrence      Open and closed comedones      Inflammatory papules and pustules      Verrucoid papule consistent consistent with wart     Erythematous eczematous patches and plaques     Dystrophic onycholytic nail with subungual debris c/w onychomycosis     Umbilicated papule    Erythematous-base heme-crusted tan verrucoid plaque consistent with inflamed seborrheic keratosis     Erythematous Silvery Scaling Plaque c/w Psoriasis     See annotation      Assessment / Plan:      Pathology Orders:       Normal Orders This Visit    Specimen to Pathology, Dermatology     Questions:    Procedure Type: Dermatology and skin neoplasms    Number of Specimens: 1    ------------------------: -------------------------    Spec 1 Procedure: Punch Biopsy    Spec 1 Clinical Impression: Letf mandible    Spec 1 Source: R/o SLE vs factitial skin disease vs other    Clinical Information: see EPIC    Clinical History: see EPIC    Specimen Source: Skin    Release to patient: Immediate    Send normal result to authorizing provider's In Basket if patient is active on MyChart: Yes          Dermatosis  -     Specimen to Pathology, Dermatology  -     triamcinolone acetonide 0.1% (KENALOG) 0.1 % ointment; Apply to affected areas twice daily for four weeks. Refrain from picking dry skin or any harsh scrubbing.  Dispense: 30 g; Refill: 1    Punch biopsy procedure note:  Punch biopsy performed after verbal consent obtained. Area marked and prepped with alcohol. Approximately 1cc of 1% lidocaine with epinephrine injected. 2 mm disposable punch used to remove lesion. Hemostasis obtained and biopsy site closed with 1 - 2 Prolene sutures. Wound care instructions reviewed with patient and handout given.       Pt educated that overuse of steroids can lead to skin thinning/atrophy, hypopigmentation, striae.      Follow up in about 4 weeks (around 7/15/2025).

## 2025-06-30 ENCOUNTER — CLINICAL SUPPORT (OUTPATIENT)
Dept: DERMATOLOGY | Facility: CLINIC | Age: 65
End: 2025-06-30
Payer: MEDICARE

## 2025-06-30 DIAGNOSIS — Z48.02 VISIT FOR SUTURE REMOVAL: Primary | ICD-10-CM

## 2025-06-30 PROCEDURE — 99024 POSTOP FOLLOW-UP VISIT: CPT | Mod: S$GLB,,, | Performed by: PHYSICIAN ASSISTANT

## 2025-06-30 NOTE — PROGRESS NOTES
"CC: 65 y.o.female patient is here for suture removal.     HPI: Patient is s/p punch biopsy/excision of left mandible, punch biopsy:  on 06/17/2025.  Patient reports no problems.    WOUND PE:  Sutures intact.  Wound healing well.  Good approximation of skin edges.  No signs or symptoms of infection.    IMPRESSION:    The specimen is received in formalin labeled "left mandible". The specimen consists of one punch biopsy of tan-yellow skin measuring 0.1 cm in diameter and excised to a depth of 0.4 cm. The specimen is inked black at the resection margin and submitted entirely in cassette     PLAN:  Sutures were taken out prior to appointment.   Continue wound care.    RTC: In 4 week for skin check or sooner should any new concerns arise   "

## 2025-07-15 ENCOUNTER — CLINICAL SUPPORT (OUTPATIENT)
Dept: REHABILITATION | Facility: HOSPITAL | Age: 65
End: 2025-07-15
Payer: MEDICARE

## 2025-07-15 DIAGNOSIS — M25.511 ACUTE PAIN OF RIGHT SHOULDER: ICD-10-CM

## 2025-07-15 DIAGNOSIS — M25.611 DECREASED RIGHT SHOULDER RANGE OF MOTION: ICD-10-CM

## 2025-07-15 DIAGNOSIS — S46.011A TRAUMATIC COMPLETE TEAR OF RIGHT ROTATOR CUFF, INITIAL ENCOUNTER: Primary | ICD-10-CM

## 2025-07-15 PROCEDURE — 97530 THERAPEUTIC ACTIVITIES: CPT | Mod: PN

## 2025-07-21 NOTE — PROGRESS NOTES
Outpatient Rehab    Physical Therapy Progress Note : Updated Plan of Care    Patient Name: Emily Peter  MRN: [id number]  YOB: 1960  Encounter Date: 7/15/2025    Therapy Diagnosis:   Encounter Diagnoses   Name Primary?    Traumatic complete tear of right rotator cuff, initial encounter Yes    Decreased right shoulder range of motion     Acute pain of right shoulder      Physician: Vickie Sheldon PA-C    Physician Orders: Eval and Treat  Medical Diagnosis: S/P right rotator cuff repair     Visit # / Visits Authorized:  10 / 10  Insurance Authorization Period: 3/25/2025 to 3/25/2026  Date of Evaluation: 3/28/2025  Plan of Care Certification: 6/4/2025 to 8/29/2025     PT/PTA:     Number of PTA visits since last PT visit:   Time In:   1605  Time Out:  1700  Total Time:   55  Total Billable Time:  30    FOTO:  Intake Score:  %  Survey Score 1:  %  Survey Score 2:  %         Subjective : right shoulder has been doing well recently. She had a lapse in therapy due to work and then getting sick   Pain: 2/10 - right shoulder    Objective      Shoulder Range of Motion  Right Shoulder   Active (deg) Passive (deg) Pain   Flexion 130       Extension         Scaption         ABduction 130       ADduction         Horizontal ABduction         Horizontal ADduction         External Rotation (Shoulder ABducted 0 degrees) 50       External Rotation (Shoulder ABducted 45 degrees)         External Rotation (Shoulder ABducted 90 degrees)         Internal Rotation (Shoulder ABducted 0 degrees)         Internal Rotation (Shoulder ABducted 45 degrees)         Internal Rotation (Shoulder ABducted 90 degrees)                       Shoulder Strength - Planes of Motion   Right Strength Right Pain Left Strength Left  Pain   Flexion 4+         Extension 4+         ABduction 4+         ADduction           Horizontal ABduction 4         Horizontal ADduction           Internal Rotation 0° 4+         Internal Rotation 90°            External Rotation 0° 4         External Rotation 90°                            Treatment:    Therapeutic Activity: 55 minutes   Plan of care update   Chest press AAROM: 3x10 - 5#  Shoulder flexion AAROM: 3x10 - 1#  Side lying shoulder external rotation: 3x10 - 1# - right   Side lying shoulder abduction: 3x10 - 1# - right   Standing shoulder extension: 3x10 - red theraband   Standing shoulder internal rotation: 3x10 - green theraband   Standing straight arm pull downs: 3x10 - green theraband   Standing rows: 3x10 - blue theraband   Standing scaption: 3x10 - 0#  Upper body ergometer: 3/3 - L2    Time Entry(in minutes): 55       Assessment & Plan   Assessment: Patient is a 65 year old female status post right shoulder arthroscopy with subacromial decompression, right shoulder arthroscopic rotator cuff repair using Opus suture anchors X 3, and right shoulder arthroscopic acromioclavicular joint resection on 3/11/2025. Currently following type II rotator cuff repair. No biceps tenodesis. Patient is progressing well with AAROM, AROM, and resistance strengthening. Recent lapse in therapy. Plan of care updated today to continue physical therapy through August focusing on strength and resistance training.     Evaluation/Treatment Tolerance: Patient tolerated treatment well    Patient will continue to benefit from skilled outpatient physical therapy to address the deficits listed in the problem list box on initial evaluation, provide pt/family education and to maximize pt's level of independence in the home and community environment.     Patient's spiritual, cultural, and educational needs considered and patient agreeable to plan of care and goals.           Plan:  Type II RTC repair protocol     Goals:   Active       Pain       Patient will report pain of 0/10 demonstrating a reduction of overall pain       Start:  04/04/25    Expected End:  05/30/25               Range of Motion       Patient will achieve right  shoulder flexion of 160 degrees of PROM.       Start:  04/04/25    Expected End:  05/30/25            Patient will achieve right shoulder abduction of 150 degrees PROM       Start:  04/04/25    Expected End:  05/30/25            Patient will achieve right shoulder external rotation of 45 degrees PROM in neutral       Start:  04/04/25    Expected End:  05/30/25               Strength       Patient will achieve right shoulder flexion strength of 4/5       Start:  04/04/25    Expected End:  05/30/25            Patient will achieve right shoulder abduction strength of 4/5       Start:  04/04/25    Expected End:  05/30/25            Patient will achieve right shoulder external rotation strength of 4/5 in neutral       Start:  04/04/25    Expected End:  05/30/25                Romulo Betancourt, PT

## 2025-07-23 NOTE — PROGRESS NOTES
"  Outpatient Rehab    Physical Therapy Visit    Patient Name: Emily Peter  MRN: [id number]  YOB: 1960  Encounter Date: 7/24/2025    Therapy Diagnosis:   Encounter Diagnoses   Name Primary?    Traumatic complete tear of right rotator cuff, initial encounter Yes    Decreased right shoulder range of motion     Acute pain of right shoulder     S/P right rotator cuff repair              Physician: Vickie Sheldon PA-C    Physician Orders: Eval and Treat  Medical Diagnosis: S/P right rotator cuff repair     Visit # / Visits Authorized:  10 / 10  Insurance Authorization Period: 3/25/2025 to 3/25/2026  Date of Evaluation: 3/28/2025  Plan of Care Certification: 3/28/2025 to 6/4/2025      PT/PTA:     Number of PTA visits since last PT visit:   Time In: 1400   Time Out: 1455  Total Time: 55   Total Billable Time: 30    FOTO:  Intake Score:  %  Survey Score 1:  %  Survey Score 2:  %         Subjective :last time I was here I was so sore afterwards. But, I'm okay now. Just a little sore, I cleaned my bathroom and it hurts more."  Pain: 2/10 - right shoulder    Objective            Treatment:    Therapeutic Activity  Chest press AAROM: 3x10 - 5#  Shoulder flexion AAROM: 3x10 - 1#  Side lying shoulder external rotation: 3x10 - 2# - right   Side lying shoulder abduction: 3x10 - 2# - right   Standing shoulder extension: 3x10 - red theraband   Standing shoulder internal rotation: 3x10 - green theraband   Standing straight arm pull downs: 3x10 - green theraband   Standing rows: 3x10 - blue theraband   Standing scaption: 3x10 - 0#  Upper body ergometer: 3/3 - L2  Pulleys 3 minutes scapation     Time Entry(in minutes): 30  Manual Therapy Time Entry: 10  Therapeutic Activity Time Entry: 20    Assessment & Plan   Assessment: Patient is a 65 year old female status post right shoulder arthroscopy with subacromial decompression, right shoulder arthroscopic rotator cuff repair using Opus suture anchors X 3, and right " shoulder arthroscopic acromioclavicular joint resection on 3/11/2025. Currently following type II rotator cuff repair. No biceps tenodesis. Patient is progressing well with AAROM, AROM, and resistance strengthening. Increased resistance as indicated in bold this afternoon.     Evaluation/Treatment Tolerance: Patient tolerated treatment well    Patient will continue to benefit from skilled outpatient physical therapy to address the deficits listed in the problem list box on initial evaluation, provide pt/family education and to maximize pt's level of independence in the home and community environment.     Patient's spiritual, cultural, and educational needs considered and patient agreeable to plan of care and goals.           Plan:  Type II RTC repair protocol     Goals:   e         Pain         Patient will report pain of 0/10 demonstrating a reduction of overall pain         Start:  04/04/25    Expected End:  05/30/25                 Range of Motion         Patient will achieve right shoulder flexion of 160 degrees of PROM.         Start:  04/04/25    Expected End:  05/30/25              Patient will achieve right shoulder abduction of 150 degrees PROM         Start:  04/04/25    Expected End:  05/30/25              Patient will achieve right shoulder external rotation of 45 degrees PROM in neutral         Start:  04/04/25    Expected End:  05/30/25                 Strength         Patient will achieve right shoulder flexion strength of 4/5         Start:  04/04/25    Expected End:  05/30/25              Patient will achieve right shoulder abduction strength of 4/5         Start:  04/04/25    Expected End:  05/30/25              Patient will achieve right shoulder external rotation strength of 4/5 in neutral         Start:  04/04/25    Expected End:  05/30/25                     Saroj Marroquin PTA

## 2025-07-24 ENCOUNTER — CLINICAL SUPPORT (OUTPATIENT)
Dept: REHABILITATION | Facility: HOSPITAL | Age: 65
End: 2025-07-24
Payer: MEDICARE

## 2025-07-24 DIAGNOSIS — M25.611 DECREASED RIGHT SHOULDER RANGE OF MOTION: ICD-10-CM

## 2025-07-24 DIAGNOSIS — Z98.890 S/P RIGHT ROTATOR CUFF REPAIR: ICD-10-CM

## 2025-07-24 DIAGNOSIS — M25.511 ACUTE PAIN OF RIGHT SHOULDER: ICD-10-CM

## 2025-07-24 DIAGNOSIS — S46.011A TRAUMATIC COMPLETE TEAR OF RIGHT ROTATOR CUFF, INITIAL ENCOUNTER: Primary | ICD-10-CM

## 2025-07-24 PROCEDURE — 97530 THERAPEUTIC ACTIVITIES: CPT | Mod: PN,CQ

## 2025-07-24 PROCEDURE — 97140 MANUAL THERAPY 1/> REGIONS: CPT | Mod: PN,CQ

## 2025-08-06 ENCOUNTER — OFFICE VISIT (OUTPATIENT)
Dept: DERMATOLOGY | Facility: CLINIC | Age: 65
End: 2025-08-06
Payer: MEDICARE

## 2025-08-06 DIAGNOSIS — L80 VITILIGO: Primary | ICD-10-CM

## 2025-08-06 DIAGNOSIS — L68.0 HIRSUTISM: ICD-10-CM

## 2025-08-06 DIAGNOSIS — L81.9 HYPERPIGMENTATION: ICD-10-CM

## 2025-08-06 PROCEDURE — G2211 COMPLEX E/M VISIT ADD ON: HCPCS | Mod: S$GLB,,, | Performed by: PHYSICIAN ASSISTANT

## 2025-08-06 PROCEDURE — 99214 OFFICE O/P EST MOD 30 MIN: CPT | Mod: S$GLB,,, | Performed by: PHYSICIAN ASSISTANT

## 2025-08-06 PROCEDURE — 1159F MED LIST DOCD IN RCRD: CPT | Mod: CPTII,S$GLB,, | Performed by: PHYSICIAN ASSISTANT

## 2025-08-06 PROCEDURE — 3051F HG A1C>EQUAL 7.0%<8.0%: CPT | Mod: CPTII,S$GLB,, | Performed by: PHYSICIAN ASSISTANT

## 2025-08-06 PROCEDURE — 1160F RVW MEDS BY RX/DR IN RCRD: CPT | Mod: CPTII,S$GLB,, | Performed by: PHYSICIAN ASSISTANT

## 2025-08-06 PROCEDURE — 3288F FALL RISK ASSESSMENT DOCD: CPT | Mod: CPTII,S$GLB,, | Performed by: PHYSICIAN ASSISTANT

## 2025-08-06 PROCEDURE — 1126F AMNT PAIN NOTED NONE PRSNT: CPT | Mod: CPTII,S$GLB,, | Performed by: PHYSICIAN ASSISTANT

## 2025-08-06 PROCEDURE — 99999 PR PBB SHADOW E&M-EST. PATIENT-LVL IV: CPT | Mod: PBBFAC,,, | Performed by: PHYSICIAN ASSISTANT

## 2025-08-06 PROCEDURE — 1101F PT FALLS ASSESS-DOCD LE1/YR: CPT | Mod: CPTII,S$GLB,, | Performed by: PHYSICIAN ASSISTANT

## 2025-08-06 RX ORDER — SPIRONOLACTONE 50 MG/1
TABLET, FILM COATED ORAL
Qty: 60 TABLET | Refills: 3 | Status: SHIPPED | OUTPATIENT
Start: 2025-08-06

## 2025-08-06 RX ORDER — RUXOLITINIB 15 MG/G
CREAM TOPICAL
Qty: 60 G | Refills: 2 | Status: ACTIVE | OUTPATIENT
Start: 2025-08-06

## 2025-08-06 NOTE — PROGRESS NOTES
Subjective:      Patient ID:  Jaelyn Peter is a 65 y.o. female who presents for   Chief Complaint   Patient presents with    Follow-up     Patient last seen 6/17/2025 for Dry Skin.    Patient here today for f/u  Improving (still has dark spots on chin and wants to be prescribed something)  Used triamcinolone 0.1% ointment BID for 4 weeks.     Reports that she hasn't been picking face.       Review of Systems   Skin:  Positive for dry skin. Negative for itching and rash.       Objective:   Physical Exam   Constitutional: She appears well-developed and well-nourished. No distress.   Neurological: She is alert and oriented to person, place, and time. She is not disoriented.   Psychiatric: She has a normal mood and affect.   Skin:   Areas Examined (abnormalities noted in diagram):   Head / Face Inspection Performed  Neck Inspection Performed               Diagram Legend     Erythematous scaling macule/papule c/w actinic keratosis       Vascular papule c/w angioma      Pigmented verrucoid papule/plaque c/w seborrheic keratosis      Yellow umbilicated papule c/w sebaceous hyperplasia      Irregularly shaped tan macule c/w lentigo     1-2 mm smooth white papules consistent with Milia      Movable subcutaneous cyst with punctum c/w epidermal inclusion cyst      Subcutaneous movable cyst c/w pilar cyst      Firm pink to brown papule c/w dermatofibroma      Pedunculated fleshy papule(s) c/w skin tag(s)      Evenly pigmented macule c/w junctional nevus     Mildly variegated pigmented, slightly irregular-bordered macule c/w mildly atypical nevus      Flesh colored to evenly pigmented papule c/w intradermal nevus       Pink pearly papule/plaque c/w basal cell carcinoma      Erythematous hyperkeratotic cursted plaque c/w SCC      Surgical scar with no sign of skin cancer recurrence      Open and closed comedones      Inflammatory papules and pustules      Verrucoid papule consistent consistent with wart     Erythematous  eczematous patches and plaques     Dystrophic onycholytic nail with subungual debris c/w onychomycosis     Umbilicated papule    Erythematous-base heme-crusted tan verrucoid plaque consistent with inflamed seborrheic keratosis     Erythematous Silvery Scaling Plaque c/w Psoriasis     See annotation      Assessment / Plan:           Vitiligo  -     ruxolitinib (OPZELURA) 1.5 % Crea; Aaa (white discolored spots on chin) bid  Dispense: 60 g; Refill: 2      Medication counseling:  Opzelura is a topical yaya inhibitor which was FDA approved for vitiligo ages 12 and up in 2022. The oral (by mouth) form of the medication has potential for serious side effects including but not limited to major adverse cardiovascular events (MACE), thrombosis (PE), malignancy, infection, lab alterations. This risk is considerably lower with the topical form and having such side effect while on topical medicaiton would be rare, and the investigators recommend to use on less than 10% of body surface area in vitiligo. Possible side effects of the topical form include skin irritation or acne at application site.        Hyperpigmentation- laser hair removal pamphlet given and recommended    Hirsutism  -     Potassium; Future; Expected date: 11/06/2025  -     spironolactone (ALDACTONE) 50 MG tablet; Start with 1 po qday, increase to 2 po qday as tolerated  Dispense: 60 tablet; Refill: 3      Discussed benefits and risks of therapy including but not limited to breakthrough bleeding, breast tenderness, and elevated potassium levels which may give symptoms of fatigue, palpitations, and nausea. Patient should limit potassium intake - avoid potassium supplements or salt substitutes, limit bananas and citrus fruits. Pregnancy must be avoided while taking spironolactone.    Lab Results   Component Value Date    K 4.2 02/26/2025         Follow up in about 3 months (around 11/6/2025).

## 2025-08-19 ENCOUNTER — CLINICAL SUPPORT (OUTPATIENT)
Dept: REHABILITATION | Facility: HOSPITAL | Age: 65
End: 2025-08-19
Payer: MEDICARE

## 2025-08-19 DIAGNOSIS — S46.011A TRAUMATIC COMPLETE TEAR OF RIGHT ROTATOR CUFF, INITIAL ENCOUNTER: Primary | ICD-10-CM

## 2025-08-19 DIAGNOSIS — M25.511 ACUTE PAIN OF RIGHT SHOULDER: ICD-10-CM

## 2025-08-19 DIAGNOSIS — M25.611 DECREASED RIGHT SHOULDER RANGE OF MOTION: ICD-10-CM

## 2025-08-19 PROCEDURE — 97530 THERAPEUTIC ACTIVITIES: CPT | Mod: PN

## (undated) DEVICE — SUPPORT SLING SHOT II MEDIUM

## (undated) DEVICE — ALCOHOL 70% ISOP W/GREEN 16OZ

## (undated) DEVICE — PACK SURGERY START

## (undated) DEVICE — COVER OVERHEAD SURG LT BLUE

## (undated) DEVICE — PACK ECLIPSE BASIC III SURG

## (undated) DEVICE — SPONGE COTTON TRAY 4X4IN

## (undated) DEVICE — COVER PROXIMA MAYO STAND

## (undated) DEVICE — CONNECTOR TUBING STR 5 IN 1

## (undated) DEVICE — GLOVE SENSICARE PI MICRO 7.5

## (undated) DEVICE — WAND COBLATION TURBOVAC 90

## (undated) DEVICE — INSTRAMOD SHOULDER TRACTION

## (undated) DEVICE — CARTRIDGE SUTURE SMARTSTITCH

## (undated) DEVICE — PAD ABDOMINAL STERILE 5X9IN

## (undated) DEVICE — SUPPORT ULNA NERVE PROTECTOR

## (undated) DEVICE — TUBE SET INFLOW/OUTFLOW

## (undated) DEVICE — Device

## (undated) DEVICE — BLADE VORTEX SHAVER 4.5MMX13CM

## (undated) DEVICE — APPLICATOR CHLORAPREP ORN 26ML

## (undated) DEVICE — DRESSING AQUACEL SACRAL 8 X 7

## (undated) DEVICE — MANIFOLD 4 PORT

## (undated) DEVICE — SOL IRR NACL .9% 3000ML

## (undated) DEVICE — TOWEL OR XRAY BLUE 17X26IN

## (undated) DEVICE — DRAPE U SPLIT SHEET 54X76IN

## (undated) DEVICE — COVER TABLE HVY DTY 60X90IN

## (undated) DEVICE — CONN SMARTSTITCH PERFECTPASSER

## (undated) DEVICE — SOL IRR 0.9% NACL 500ML PB

## (undated) DEVICE — GOWN POLY REINF BRTH SLV LG

## (undated) DEVICE — ELECTRODE REM PLYHSV RETURN 9

## (undated) DEVICE — DRESSING XEROFORM NONADH 1X8IN

## (undated) DEVICE — DRAPE THREE-QTR REINF 53X77IN

## (undated) DEVICE — SODIUM CHLORIDE 0.9% 1000ML

## (undated) DEVICE — SUT ETHILON 3/0 18IN PS-1

## (undated) DEVICE — TAPE ADH MEDIPORE 4 X 10YDS

## (undated) DEVICE — DRAPE SHOULDER BEACH CHAIR

## (undated) DEVICE — SUT SMARTSTITCH PERFECTPASS

## (undated) DEVICE — TUBING SUC UNIV W/CONN 12FT

## (undated) DEVICE — NDL SPINAL 18GX3.5 SPINOCAN

## (undated) DEVICE — GOWN POLY REINF BRTH SLV XL